# Patient Record
Sex: FEMALE | Race: WHITE | Employment: OTHER | URBAN - METROPOLITAN AREA
[De-identification: names, ages, dates, MRNs, and addresses within clinical notes are randomized per-mention and may not be internally consistent; named-entity substitution may affect disease eponyms.]

---

## 2017-01-04 ENCOUNTER — HOSPITAL ENCOUNTER (OUTPATIENT)
Dept: PHYSICAL THERAPY | Age: 82
Discharge: HOME | End: 2017-01-04
Payer: MEDICARE

## 2017-01-04 PROCEDURE — 92526 ORAL FUNCTION THERAPY: CPT

## 2017-01-06 ENCOUNTER — HOSPITAL ENCOUNTER (OUTPATIENT)
Dept: PHYSICAL THERAPY | Age: 82
Discharge: HOME | End: 2017-01-06
Payer: MEDICARE

## 2017-01-06 PROCEDURE — 92526 ORAL FUNCTION THERAPY: CPT

## 2017-01-06 NOTE — PROGRESS NOTES
ST DAILY TREATMENT NOTE    Patient Name: Lokesh Dimas  Date:2017  : 1931  [x]  Patient  Verified  Payor: [place] MEDICARE / Plan: VA MEDICARE PART A & B / Product Type: Medicare /   In time:11:50 Out time: 12:25       Total Treatment Time (min): 35  Visit #: 4 of 16    Treatment Diagnosis: Dysphagia, unspecified [R13.10]    SUBJECTIVE  Pain Level (0-10 scale): 0  Any medication changes, allergies to medications, adverse drug reactions, diagnosis change, or new procedure performed?: [x] No    [] Yes (see summary sheet for update)    Subjective functional status/changes:   [] No changes reported  She did not cough hardly at all yesterday. She did cough just once before she used her inhaler but did not cough during breakfast. She started coughing after she got in the car and now a lot since she has been in the clinic she said. OBJECTIVE  Treatment provided includes:  Increase/Improve:  []  Voice Quality []  Cognitive Linguistic Skills []  Laryngeal/Pharyngeal Exercises   []  Vocal Loudness []  Reading Comprehension [x]  Swallowing Skills    []  Vocal Cord Function []  Auditory Comprehension []  Oral Motor Skills   []  Resonance []  Writing Skills [x]  Compensatory strategies    []  Speech Intelligibility []  Expressive Language []     []  Breath Support/Coord. []  Receptive language []    []  Articulation [x]  Safety Awareness []    []  Fluency []  Word Retrieval []      Treatment Provided:  Pt state information regarding gerd diet and lifestyle guidelines: 70% acc with min cues. GERD information-teaching/answering all her questions. thin liquid trials via cup with the use of her swallowing guidelines. She demo use of her swallowing strategies: sitting upright for all oral intake; taking small isolated sips.  With chin tuck for the liquids, and extra (dry) swallow, 10/10 100% acc for liquids and solids VC's 20% of the time for chin tuck and extra dry swallow, independence with the other swallowing strategies. She coughed/throat cleared 5 times today, once after PO trial and the others before. .      Patient/Caregiver  Education: [] Review HEP      Answered her questions about foods that are recommended and those that aren't related to acidity. about humidifier, dry vocal folds could be contributing to your cough. HEP/Handouts given:    Humidifier handout. Clear filter daily. Other Objective/Functional Measures:    Pain Level (0-10 scale) post treatment: 0    ASSESSMENT  Patient required an occ cue to chin tuck and dbl swallow with thin liquid trials today. She coughed once post swallow and continues to cough without PO trials also. []  See Plan of Care  []  See progress note/recertification  []  See Discharge Summary    Patient will continue to benefit from skilled therapy to address remaining functional deficits: pharyngeal dysphagia  And moderate-severe dysphonia with chronic cough  Progress towards goals / Updated goals:  1. Patient will be able to state and follow compensatory swallowing strategies (sitting upright for all oral intake and for at least 30 minutes afterward meals, decrease bolus size, extras dry swallows alternate solids and liquids, meds in applesauce cyclical ingestion, ) for po intake for liquids and solids with 8/10 trials - 80% accuracy with min cues    progressing  3. Patient will be able to state GERD diet and lifestyle guidelines with 80% acc with questions and answer format with min cues. progressing       Long Term Goals: To be accomplished in 10-12 weeks   patient will:          1. Tolerate the least restrictive diet consistency utilizing compensatory swallow techniques((sitting upright for all oral intake and for at least 30 minutes afterward meals, decrease bolus size, extras dry swallows alternate solids and liquids, meds in applesauce cyclical ingestion) , without overt s/sx aspiration/distress in 8/10 trials with mod I        2.  Demonstrate the ability to adequately self-monitor swallowing skills and perform   appropriate compensatory techniques to reduce s/sx of aspiration and to safely consume least-restrictive diet 8/10 trials 80% accuracy.       PLAN  [x]  Continue plan of care  []  Modify Goals/Treatment Plan      []  Discharge due to:  [] Other:   CRIS Bonds 1/6/2017  11:59 AM    Future Appointments  Date Time Provider Roverto Meza   1/11/2017 3:00 PM CRIS Bonsd [facility]   1/18/2017 10:00 AM CRIS Bonds [facility]   1/25/2017 10:00 AM CRIS Bonds [facility]   2/1/2017 10:00 AM CRIS Bonds [facility]   2/8/2017 10:00 AM CRIS Bonds [facility]   2/15/2017 10:00 AM CRIS Bonds [facility]   2/22/2017 10:00 AM CRIS Bonds [facility]

## 2017-01-11 ENCOUNTER — APPOINTMENT (OUTPATIENT)
Dept: PHYSICAL THERAPY | Age: 82
End: 2017-01-11
Payer: MEDICARE

## 2017-01-11 ENCOUNTER — HOSPITAL ENCOUNTER (OUTPATIENT)
Dept: PHYSICAL THERAPY | Age: 82
Discharge: HOME | End: 2017-01-11
Payer: MEDICARE

## 2017-01-11 PROCEDURE — 92526 ORAL FUNCTION THERAPY: CPT

## 2017-01-11 NOTE — PROGRESS NOTES
ST DAILY TREATMENT NOTE    Patient Name: Edwige Acevedo  Date:2017  : 1931  [x]  Patient  Verified  Payor: VA MEDICARE / Plan: VA MEDICARE PART A & B / Product Type: Medicare /   In time:2:50 Out time: 2:25      Total Treatment Time (min): 35  Visit #: 5 of 16    Treatment Diagnosis: Dysphagia, unspecified [R13.10]    SUBJECTIVE  Pain Level (0-10 scale): 0  Any medication changes, allergies to medications, adverse drug reactions, diagnosis change, or new procedure performed?: [x] No    [] Yes (see summary sheet for update)    Subjective functional status/changes:   [] No changes reported  She did coughed earlier this morning because she did have to make a phone call so she coughed to clear her throat. She said that she ate a whole sandwich today for lunch and did not have any problems. She reports using her humidifier. OBJECTIVE  Treatment provided includes:  Increase/Improve:  []  Voice Quality []  Cognitive Linguistic Skills []  Laryngeal/Pharyngeal Exercises   []  Vocal Loudness []  Reading Comprehension [x]  Swallowing Skills    []  Vocal Cord Function []  Auditory Comprehension []  Oral Motor Skills   []  Resonance []  Writing Skills [x]  Compensatory strategies    []  Speech Intelligibility []  Expressive Language []     []  Breath Support/Coord. []  Receptive language []    []  Articulation [x]  Safety Awareness []    []  Fluency []  Word Retrieval []      Treatment Provided:  Pt stated information regarding gerd diet and lifestyle guidelines: 100% acc with cues. She stated that she is following them. Patient stated her swallowing guidelines with 100% acc. thin liquid trials via cup and regular consistency harder crackers with the use of her swallowing guidelines. She demo use of her swallowing strategies:   sitting upright for all oral intake; taking small bites and small isolated sips.  With chin tuck for the liquids, and extra (dry) swallow,and  alternate solids and liquids; 10/10 100% acc for liquids and solids [place]. She said that she is sitting up at least 30 minutes after PO, taking her meds in applesauce without difficulty. She coughed/throat cleared 9  times today, none with PO. She said that she felt dry in her throat so she coughed. She would throat clear and cough and asked why she said that her throat felt dry. Patient/Caregiver  Education: [] Review HEP      Except when eating or drinking try to use an alternative to coughing and throat clearing-hard dry swallow or small liquid sip with chin tuck and extra dry swallow. Effect of excessive coughing/throat clearing on vocal folds which can cause hoarseness. HEP/Handouts given: Alt to throat clearing, handout about throat clearing and use of alt. Other Objective/Functional Measures:    Pain Level (0-10 scale) post treatment: 0    ASSESSMENT  Patient was independent for stating GERD and swallowing guidelines. She reported that she is following them. She was independent with following the swallowing guidelines with thin liquid and reg consistency and exhibited no overt s/s of asp (change in vocal quality, throat clear, cough). She did cough and throat clear throughout the session prior to and after not related to PO. It appears that some of it could be habitual.   []  See Plan of Care  []  See progress note/recertification  []  See Discharge Summary    Patient will continue to benefit from skilled therapy to address remaining functional deficits: pharyngeal dysphagia  And moderate-severe dysphonia with chronic cough  Progress towards goals / Updated goals:  1.  Patient will be able to state and follow compensatory swallowing strategies (sitting upright for all oral intake and for at least 30 minutes afterward meals, decrease bolus size, extras dry swallows alternate solids and liquids, meds in applesauce cyclical ingestion,) for po intake for liquids and solids with 8/10 trials - 80% accuracy with min cues    Goal met.  3. Patient will be able to state GERD diet and lifestyle guidelines with 80% acc with questions and answer format with min cues. Goal met.      Long Term Goals: To be accomplished in 10-12 weeks   patient will:          1. Tolerate the least restrictive diet consistency utilizing compensatory swallow techniques((sitting upright for all oral intake and for at least 30 minutes afterward meals, decrease bolus size, extras dry swallows alternate solids and liquids, meds in applesauce cyclical ingestion) , without overt s/sx aspiration/distress in 8/10 trials with mod I     MBS requested-MD has not returned from [place] vacation-not until 1/13/17.   2. Demonstrate the ability to adequately self-monitor swallowing skills and perform   appropriate compensatory techniques to reduce s/sx of aspiration and to safely consume least-restrictive diet 8/10 trials 80% accuracy. Goal met for stated guidelines        PLAN  [x]  Continue plan of care  []  Modify Goals/Treatment Plan      []  Discharge due to:  [x] Other: SLP called Dr. Jayne Cummings office again regarding script for MBS-Dr. Kinga Lane will return from [facility] on 1/13/17-her staff will give her the message. Place patient on hold until she has an MBS (if MD approves it).   CRIS Crisostomo 1/11/2017  3:00PM    Future Appointments  Date Time Provider Roverto Meza   1/11/2017 3:00 PM CRIS Crisostomo [facility]   1/18/2017 10:00 AM CRIS Crisostomo [facility]   1/25/2017 10:00 AM CRIS Crisostomo [facility]   2/1/2017 11:30 AM CRIS Crisostomo [facility]   2/8/2017 10:45 AM CRIS Crisostomo [facility]   2/15/2017 11:30 AM CRIS Crisostomo [facility]   2/22/2017 10:45 AM CRIS Crisostomo [facility]

## 2017-01-12 ENCOUNTER — APPOINTMENT (OUTPATIENT)
Dept: PHYSICAL THERAPY | Age: 82
End: 2017-01-12
Payer: MEDICARE

## 2017-01-18 ENCOUNTER — APPOINTMENT (OUTPATIENT)
Dept: PHYSICAL THERAPY | Age: 82
End: 2017-01-18
Payer: MEDICARE

## 2017-01-18 DIAGNOSIS — F32.89 OTHER DEPRESSION: ICD-10-CM

## 2017-01-18 RX ORDER — SERTRALINE HYDROCHLORIDE 50 MG/1
TABLET, FILM COATED ORAL
Qty: 90 TAB | Refills: 3 | Status: SHIPPED | OUTPATIENT
Start: 2017-01-18 | End: 2017-01-26 | Stop reason: SDUPTHER

## 2017-01-19 RX ORDER — SERTRALINE HYDROCHLORIDE 50 MG/1
TABLET, FILM COATED ORAL
Qty: 30 TAB | Refills: 0 | Status: SHIPPED | OUTPATIENT
Start: 2017-01-19

## 2017-01-25 ENCOUNTER — APPOINTMENT (OUTPATIENT)
Dept: PHYSICAL THERAPY | Age: 82
End: 2017-01-25
Payer: MEDICARE

## 2017-01-26 ENCOUNTER — OFFICE VISIT (OUTPATIENT)
Dept: INTERNAL MEDICINE CLINIC | Age: 82
End: 2017-01-26

## 2017-01-26 VITALS
HEIGHT: 65 IN | RESPIRATION RATE: 16 BRPM | HEART RATE: 74 BPM | OXYGEN SATURATION: 98 % | DIASTOLIC BLOOD PRESSURE: 58 MMHG | TEMPERATURE: 98.7 F | BODY MASS INDEX: 25.33 KG/M2 | WEIGHT: 152 LBS | SYSTOLIC BLOOD PRESSURE: 132 MMHG

## 2017-01-26 DIAGNOSIS — H60.391 OTHER INFECTIVE OTITIS EXTERNA OF RIGHT EAR: Primary | ICD-10-CM

## 2017-01-26 DIAGNOSIS — F43.21 UNRESOLVED GRIEF: ICD-10-CM

## 2017-01-26 RX ORDER — CIPROFLOXACIN AND DEXAMETHASONE 3; 1 MG/ML; MG/ML
SUSPENSION/ DROPS AURICULAR (OTIC)
Qty: 7.5 ML | Refills: 1 | Status: SHIPPED | OUTPATIENT
Start: 2017-01-26 | End: 2017-03-22 | Stop reason: ALTCHOICE

## 2017-01-26 NOTE — PATIENT INSTRUCTIONS
Health Maintenance Due   Topic Date Due    DTaP/Tdap/Td  (1 - Tdap) 07/25/1989    Glaucoma Screening   07/23/1996    Annual Well Visit  07/23/1996

## 2017-01-26 NOTE — ACP (ADVANCE CARE PLANNING)
The patient has made an advanced directive. she was advised to bring a copy into the office for us to scan into the system.

## 2017-01-26 NOTE — MR AVS SNAPSHOT
Visit Information Date & Time Provider Department Dept. Phone Encounter #  
 1/26/2017 11:30 AM Valeria Chang MD [facility] Reji Brito [phone] [id number] Your Appointments 2/1/2017  3:45 PM  
Follow Up with Leticia Arroyo MD  
[address] ([facility]) Appt Note: from 12/2016  
 [address] [facility], [address] [address]  
[phone]  
  
   
 [address] [place] [address], [address] [facility] [place] [address]  
  
    
 5/30/2017  2:00 PM  
Follow Up with Valeria Chang MD  
[address] [facility]) Appt Note: 4mo  
 [address], [address] [place] [address]  
  
   
 [address] [facility], [address] [place] [address] Upcoming Health Maintenance Date Due DTaP/Tdap/Td series (1 - Tdap) 7/25/1989 GLAUCOMA SCREENING Q2Y 7/23/1996 MEDICARE YEARLY EXAM 7/23/1996 Allergies as of 1/26/2017  Review Complete On: 1/26/2017 By: Valeria Chang MD  
  
 Severity Noted Reaction Type Reactions Penicillin V Potassium    Diarrhea States not allergic Shellfish Derived  04/21/2016    Swelling Current Immunizations  Reviewed on 12/12/2016 Name Date Influenza High Dose Vaccine PF 12/12/2016 Influenza Vaccine (Quad) PF 9/25/2015  1:00 PM  
 Pneumococcal Polysaccharide (PPSV-23) 9/19/2011 Td 7/24/1989 Zoster Vaccine, Live 2/8/2008 Not reviewed this visit Vitals BP Pulse Temp Resp Height(growth percentile) 132/58 (BP 1 Location: Left arm, BP Patient Position: Sitting) 74 98.7 °F (37.1 °C) (Tympanic) 16 5' 5\" (1.651 m) Weight(growth percentile) SpO2 BMI OB Status Smoking Status 152 lb (68.9 kg) 98% 25.29 kg/m2 Hysterectomy Never Smoker BMI and BSA Data Body Mass Index Body Surface Area  
 25.29 kg/m 2 1.78 m 2 Preferred Pharmacy Pharmacy Name Phone [facility] [facility] [phone] Your Updated Medication List  
  
   
This list is accurate as of: 17 12:41 PM.  Always use your most recent med list.  
  
  
  
  
 ciprofloxacin-dexamethasone 0.3-0.1 % otic suspension Commonly known as:  CIPRODEX  
5 drops in right ear twice per day  
  
 clotrimazole-betamethasone topical cream  
Commonly known as:  Yadira Ruthie Apply  to affected area two (2) times a day. cyanocobalamin 1,000 mcg tablet Take 1,000 mcg by mouth daily. diazePAM 5 mg tablet Commonly known as:  VALIUM Take 5 mg by mouth every six (6) hours as needed for Anxiety. fluticasone 50 mcg/actuation nasal spray Commonly known as:  FLONASE  
1 spray each nostril daily  
  
 hydroCHLOROthiazide 12.5 mg capsule Commonly known as:  Ladonna [place] TAKE ONE CAPSULE BY MOUTH ONCE DAILY IN THE MORNING  
  
 PREMARIN 0.3 mg tablet Generic drug:  conjugated estrogens Take 0.3 mg by mouth daily. sertraline 50 mg tablet Commonly known as:  ZOLOFT  
TAKE ONE TABLET BY MOUTH ONCE DAILY  
  
 tiotropium bromide 1.25 mcg/actuation inhaler Commonly known as:  Clarice Gale Take 2 Puffs by inhalation daily. VITAMIN D3 1,000 unit Cap Generic drug:  cholecalciferol Take  by mouth daily. ZyrTEC 10 mg tablet Generic drug:  cetirizine Take  by mouth. Prescriptions Sent to Pharmacy Refills  
 tiotropium bromide (SPIRIVA RESPIMAT) 1.25 mcg/actuation inhaler 3 Sig: Take 2 Puffs by inhalation daily. Class: Normal  
 Pharmacy: [address] [place] [address] Ph #: [phone] Route: Inhalation  
 ciprofloxacin-dexamethasone (CIPRODEX) 0.3-0.1 % otic suspension 1 Si drops in right ear twice per day Class: Normal  
 Pharmacy: [address] [place] [address] Ph #: [phone] To-Do List   
 02/01/2017 11:30 AM  
  Appointment with CRIS Morrell at [facility] PT [address] [facility] ([phone]) 02/08/2017 10:45 AM  
  Appointment with CRIS Morrell at [facility] ([phone])  
  
 02/15/2017 11:30 AM  
  Appointment with CRIS Morrell at [facility] PT [facility] ([phone])  
  
 02/22/2017 10:45 AM  
  Appointment with CRIS Morrell at [address] [place] [address] ([phone]) Patient Instructions Health Maintenance Due Topic Date Due  
 DTaP/Tdap/Td  (1 - Tdap) 07/25/1989  [facility] Screening   [facility] Well Visit  07/23/1996 Please provide this summary of care documentation to your next provider. Your primary care clinician is listed as Zakiya Burgos. If you have any questions after today's visit, please call [phone].

## 2017-01-26 NOTE — PROGRESS NOTES
1. Have you been to the ER, urgent care clinic since your last visit? Hospitalized since your last visit? No    2. Have you seen or consulted any other health care providers outside of the [address] [place] [address] since your last visit? Include any pap smears or colon screening.  No

## 2017-01-28 NOTE — PROGRESS NOTES
The patient presents to the office today with the chief complaint of right ear pain    HPI    The patient complains of right pain. Present for several days. The pain is getting worse. There has been minimal sinusThe patient is still having difficulty getting over her 's death. She is seeing a psychologist which has been helpful. Review of Systems   HENT: Positive for ear pain. Respiratory: Negative for shortness of breath. Cardiovascular: Negative for chest pain and leg swelling. Allergies   Allergen Reactions    Penicillin V Potassium Diarrhea     States not allergic    Shellfish Derived Swelling       Current Outpatient Prescriptions   Medication Sig Dispense Refill    ciprofloxacin-dexamethasone (CIPRODEX) 0.3-0.1 % otic suspension 5 drops in right ear twice per day 7.5 mL 1    sertraline (ZOLOFT) 50 mg tablet TAKE ONE TABLET BY MOUTH ONCE DAILY 30 Tab 0    hydroCHLOROthiazide (MICROZIDE) 12.5 mg capsule TAKE ONE CAPSULE BY MOUTH ONCE DAILY IN THE MORNING 90 Cap 0    conjugated estrogens (PREMARIN) 0.3 mg tablet Take 0.3 mg by mouth daily.  cholecalciferol (VITAMIN D3) 1,000 unit cap Take  by mouth daily.  cyanocobalamin 1,000 mcg tablet Take 1,000 mcg by mouth daily.  clotrimazole-betamethasone (LOTRISONE) topical cream Apply  to affected area two (2) times a day.  fluticasone (FLONASE) 50 mcg/actuation nasal spray 1 spray each nostril daily 1 Bottle 1    diazepam (VALIUM) 5 mg tablet Take 5 mg by mouth every six (6) hours as needed for Anxiety.  cetirizine (ZYRTEC) 10 mg tablet Take  by mouth.  tiotropium bromide (SPIRIVA RESPIMAT) 1.25 mcg/actuation inhaler Take 2 Puffs by inhalation daily.  3 Inhaler 3       Past Medical History   Diagnosis Date    Acute bronchitis     Acute upper respiratory infections of unspecified site     Cough     Essential hypertension, benign     Meniere's disease, unspecified     Muscle pain     Pain in limb Past Surgical History   Procedure Laterality Date    Hx hysterectomy      Hx mohs procedure Left        Social History     Social History    Marital status:      Spouse name: N/A    Number of children: N/A    Years of education: N/A     Occupational History    Not on file. Social History Main Topics    Smoking status: Never Smoker    Smokeless tobacco: Never Used    Alcohol use No    Drug use: No    Sexual activity: Not Currently     Other Topics Concern    Not on file     Social History Narrative       Patient does not have an advanced directive on file    Visit Vitals    /58 (BP 1 Location: Left arm, BP Patient Position: Sitting)    Pulse 74    Temp 98.7 °F (37.1 °C) (Tympanic)    Resp 16    Ht 5' 5\" (1.651 m)    Wt 152 lb (68.9 kg)    SpO2 98%    BMI 25.29 kg/m2       Physical Exam   Neck: Carotid bruit is not present. No thyromegaly present. Cardiovascular: Normal rate and regular rhythm. Exam reveals no gallop. No murmur heard. Pulmonary/Chest: She has no wheezes. She has no rales. Abdominal: Soft. Bowel sounds are normal. She exhibits no distension and no mass. There is no splenomegaly or hepatomegaly. There is no tenderness. Musculoskeletal: She exhibits no edema. Lymphadenopathy:     She has no cervical adenopathy. Right: No supraclavicular adenopathy present. Left: No supraclavicular adenopathy present. Hospital Outpatient Visit on 12/07/2016   Component Date Value Ref Range Status    Creatinine (POC) 12/07/2016 0.7  0.6 - 1.3 MG/DL Final    GFR-AA (POC) 12/07/2016 >60  >60 ml/min/1.73m2 Final    GFR, non-AA (POC) 12/07/2016 >60  >60 ml/min/1.73m2 Final    Comment: Estimated GFR is calculated using the IDMS-traceable Modification of Diet in Renal Disease (MDRD) Study equation, reported for both  Americans (GFRAA) and non- Americans (GFRNA), and normalized to 1.73m2 body surface area.  The physician must decide which value applies to the patient. The MDRD study equation should only be used in individuals age 25 or older. It has not been validated for the following: pregnant women, patients with serious comorbid conditions, or on certain medications, or persons with extremes of body size, muscle mass, or nutritional status. .No results found for any visits on 01/26/17. Assessment / Plan      ICD-10-CM ICD-9-CM    1. Other infective otitis externa of right ear H60.391     2. Unresolved grief F43.21 309.1      Cirpodex otic sol  she was advised to continue her maintenance medications  Patient is to continue with counseling    Follow-up Disposition:  Return in about 4 months (around 5/26/2017). I asked Fred Palmer if she has any questions and I answered the questions. Asif Palmer states that she understands the treatment plan and agrees with the treatment plan

## 2017-02-01 ENCOUNTER — OFFICE VISIT (OUTPATIENT)
Dept: PULMONOLOGY | Age: 82
End: 2017-02-01

## 2017-02-01 ENCOUNTER — APPOINTMENT (OUTPATIENT)
Dept: PHYSICAL THERAPY | Age: 82
End: 2017-02-01

## 2017-02-01 VITALS
OXYGEN SATURATION: 98 % | TEMPERATURE: 98 F | HEIGHT: 65 IN | HEART RATE: 87 BPM | WEIGHT: 145 LBS | BODY MASS INDEX: 24.16 KG/M2 | RESPIRATION RATE: 18 BRPM | SYSTOLIC BLOOD PRESSURE: 140 MMHG | DIASTOLIC BLOOD PRESSURE: 64 MMHG

## 2017-02-01 DIAGNOSIS — J84.9 ILD (INTERSTITIAL LUNG DISEASE) (HCC): Primary | ICD-10-CM

## 2017-02-01 DIAGNOSIS — J47.9 BRONCHIECTASIS WITHOUT COMPLICATION (HCC): ICD-10-CM

## 2017-02-01 DIAGNOSIS — R13.12 OROPHARYNGEAL DYSPHAGIA: ICD-10-CM

## 2017-02-01 NOTE — PROGRESS NOTES
[facility] [place] [facility]  [facility]  [phone] fax    Medicare Progress Report  Patient name: Tariq Montemayor Start of Care: 2016   Referral source: Sho Whitney MD : 1931    Medical Diagnosis: Dysphagia, unspecified [R13.10] Onset Date:     Treatment Diagnosis: Pharyngeal dysphagia   Prior Hospitalization: see medical history Provider#: [id number]   Medications: Verified on Patient summary List   Comorbidities: HTN, Acute bronchitis, Cough, Muscle pain, Acute upper respiratory infections of unspecified site, Meniere's disease, unspecified, hysterectomy, rotator cuff repair     Prior Level of Function: consuming a regular diet with thin liquids without difficulty    Visits from Start of Care: 4    Missed Visits: 0    Reporting Period: 16 to 17    Subjective Reports: on 17 she said that she coughed earlier this morning because she did have to make a phone call so she coughed to clear her throat. She said that she ate a whole sandwich today for lunch and did not have any problems. She reports using her humidifier as this SLP suggested. On 17 she said: She did not cough hardly at all yesterday. She did cough just once before she used her inhaler but did not cough during breakfast. She started coughing after she got in the car and now a lot since she has been in the clinic she said.       [place]= independently  Current Status/ treatment goals Objective measures   1.  Patient will be able to state and follow compensatory swallowing strategies (sitting upright for all oral intake and for at least 30 minutes afterward meals, decrease bolus size, extras dry swallows alternate solids and liquids, meds in applesauce cyclical ingestion,) for po intake for liquids and solids with 8/10 trials - 80% accuracy with min cues   [x] met                 [] not met  [] progressing  Patient stated her swallowing guidelines with 100% acc. At her last visit on 1/11/17:   thin liquid trials via cup and regular consistency harder crackers with the use of her swallowing guidelines. She demo use of her swallowing strategies: sitting upright for all oral intake; taking small bites and small isolated sips. With chin tuck for the liquids, and extra (dry) swallow,and  alternate solids and liquids; 10/10 100% acc for liquids and solids [place]. She said that she is sitting up at least 30 minutes after PO, taking her meds in applesauce without difficulty. She coughed/throat cleared 9 times today, none with PO. She said that she felt dry in her throat so she coughed. She would throat clear and cough and asked why she said that her throat felt dry. 3. Patient will be able to state GERD diet and lifestyle guidelines with 80% acc with questions and answer format with min cues. Goal met. [x] met                 [] not met  [] progressing Pt stated information regarding gerd diet and lifestyle guidelines: 100% acc with cues. She stated that she is following them.       Progress with Long Term Goals  1. Tolerate the least restrictive diet consistency utilizing compensatory swallow techniques((sitting upright for all oral intake and for at least 30 minutes afterward meals, decrease bolus size, extras dry swallows alternate solids and liquids, meds in applesauce cyclical ingestion) , without overt s/sx aspiration/distress in 8/10 trials with mod I        [] met                 [x] not met  [] progressing  See STG # 1. MBS requested-MD has not returned from [place] vacation-not until 1/13/17.    2. Demonstrate the ability to adequately self-monitor swallowing skills and perform   appropriate compensatory techniques to reduce s/sx of aspiration and to safely consume least-restrictive diet 8/10 trials 80% accuracy.      [x] met                 [] not met  [] progressing  Goal met for stated guidelines.  100% acc, [place]      Key functional changes: Patient was independent for stating GERD and swallowing guidelines. She reported that she is following them. She was independent with following the swallowing guidelines with thin liquid and reg consistency and exhibited no overt s/s of asp (change in vocal quality, throat clear, cough). She did cough and throat clear throughout the session prior to and after not related to PO. It appears that some of it could be habitual.      Problems/ barriers to goal attainment: none     Assessment / Recommendations:She has an BA esophagram on 12/7/16 with a single episode of silent tracheal aspiration of a trace amount of barium contrast. There was gastroesophageal reflux in the thoracic outlet. Patient is following GERD diet and lifestyle recommendations and the swallowing guidelines developed during the bedside swallowing evaluation. She has not been doing any swallowing exercises yet-still recommending MBS-recommended on 12/23/16. Referring physician has been out of the office-returning January 13th. Several requests made to staff to direct the request to the referring MD when she returns. Problem List:    []aphasic  []dysarthric  [x]dysphagic       []alexic  []agraphic  []dysphonia       []dysfluency   []Cognitive-Linguistic Disorder       []other      Treatment Plan: Dysphagia Treatment and Patient Education; recommend MBS    Patient Goal (s) has been updated and includes: \"I want to swallow safely and stop coughing, have clearer voice\"     Updated Goals to be accomplished in 4 weeks:  Patient will participate in an MBS recommended by SLP, and with referring physician's approval.  Updated: Long Term Goals: To be accomplished in 10-12 weeks   patient will:        1.  Tolerate the least restrictive diet consistency utilizing compensatory swallow techniques(sitting upright for all oral intake and for at least 30 minutes afterward meals, decrease bolus size, extras dry swallows alternate solids and liquids, meds in applesauce cyclical ingestion and any others strategies identified via MBS), without overt s/sx aspiration/distress in 8/10 trials with mod I     Frequency / Duration: Patient to be seen 1-2 times per week for 4 weeks: beginning after patient has an MBS if her referring MD agrees to the procedure. G Codes:    [id number] Swallow Current Status CI= 1-19%   Swallow Goal Status CH= 0%    The severity rating is based on the following outcomes:    National Outcomes Measures (NOMS), results of MBS (if she participates in the procedure)      Request script for MBS to determine the reason for patient's silent aspiration on the BA swallow, determine safe swallowing guidelines based on the MBS.        Eleonora Desai, SLP 2/1/2017 12:42 PM

## 2017-02-01 NOTE — MR AVS SNAPSHOT
Visit Information Date & Time Provider Department Dept. Phone Encounter #  
 2/1/2017  3:45 PM Neeraj Cosme MD [facility] [id number] Follow-up Instructions Return in about 3 months (around 5/1/2017). Your Appointments 5/30/2017  2:00 PM  
Follow Up with Joe Lucero MD  
[facility]) Appt Note: 4mo  
 [address] [place], [address]  
  
   
 [address] [place], [address] [place] [address] Upcoming Health Maintenance Date Due DTaP/Tdap/Td series (1 - Tdap) 7/25/1989 MEDICARE YEARLY EXAM 7/23/1996 GLAUCOMA SCREENING Q2Y 2/1/2018 Allergies as of 2/1/2017  Review Complete On: 2/1/2017 By: Neeraj Cosme MD  
  
 Severity Noted Reaction Type Reactions Penicillin V Potassium    Diarrhea States not allergic Shellfish Derived  04/21/2016    Swelling Current Immunizations  Reviewed on 12/12/2016 Name Date Influenza High Dose Vaccine PF 12/12/2016 Influenza Vaccine (Quad) PF 9/25/2015  1:00 PM  
 Pneumococcal Polysaccharide (PPSV-23) 9/19/2011 Td 7/24/1989 Zoster Vaccine, Live 2/8/2008 Not reviewed this visit You Were Diagnosed With   
  
 Codes Comments Oropharyngeal dysphagia    -  Primary ICD-10-CM: R13.12 
ICD-9-CM: 787.22 Vitals BP Pulse Temp Resp Height(growth percentile) Weight(growth percentile) 140/64 (BP 1 Location: Left arm, BP Patient Position: Sitting) 87 98 °F (36.7 °C) (Oral) 18 5' 5\" (1.651 m) 145 lb (65.8 kg) SpO2 BMI OB Status Smoking Status 98% 24.13 kg/m2 Hysterectomy Never Smoker BMI and BSA Data Body Mass Index Body Surface Area  
 24.13 kg/m 2 1.74 m 2 Preferred Pharmacy Pharmacy Name Phone  [facility] [place] [facility] [phone] Your Updated Medication List  
  
   
 This list is accurate as of: 2/1/17  4:15 PM.  Always use your most recent med list.  
  
  
  
  
 ciprofloxacin-dexamethasone 0.3-0.1 % otic suspension Commonly known as:  CIPRODEX  
5 drops in right ear twice per day  
  
 clotrimazole-betamethasone topical cream  
Commonly known as:  Samreen Tricia Apply  to affected area two (2) times a day. cyanocobalamin 1,000 mcg tablet Take 1,000 mcg by mouth daily. diazePAM 5 mg tablet Commonly known as:  VALIUM Take 5 mg by mouth every six (6) hours as needed for Anxiety. fluticasone 50 mcg/actuation nasal spray Commonly known as:  FLONASE  
1 spray each nostril daily  
  
 hydroCHLOROthiazide 12.5 mg capsule Commonly known as:  Paul Raveling TAKE ONE CAPSULE BY MOUTH ONCE DAILY IN THE MORNING  
  
 PREMARIN 0.3 mg tablet Generic drug:  conjugated estrogens Take 0.3 mg by mouth daily. sertraline 50 mg tablet Commonly known as:  ZOLOFT  
TAKE ONE TABLET BY MOUTH ONCE DAILY  
  
 tiotropium bromide 1.25 mcg/actuation inhaler Commonly known as:  Lisa Gayer Take 2 Puffs by inhalation daily. VITAMIN D3 1,000 unit Cap Generic drug:  cholecalciferol Take  by mouth daily. ZyrTEC 10 mg tablet Generic drug:  cetirizine Take  by mouth. Follow-up Instructions Return in about 3 months (around 5/1/2017). To-Do List   
 02/01/2017 Imaging:  XR SWALLOW [facility] VIDEO Patient Instructions Learning About Swallowing Problems What are swallowing problems? Certain health problems that affect the nervous system can cause trouble swallowing. These conditions include stroke, ALS (also known as Chitra Gehrig's disease), Parkinson's disease, and multiple sclerosis. The muscles and nerves that help move food through the throat and esophagus may not work right. Growths, such as cancer, and other problems with your esophagus can also make it hard to swallow.  The esophagus is the tube that leads from your throat to your stomach. How are swallowing problems diagnosed? A doctor or speech therapist will examine you to check for swallowing problems. You may get swallowing tests to check how well your throat muscles work. For these tests, you swallow a special liquid that helps the doctor see your throat and esophagus on an X-ray or video screen. Other tests use a thin, flexible tube called a scope to check for problems with your esophagus. The doctor puts the scope in your mouth and down your throat to look at your esophagus. What are the symptoms? Symptoms of swallowing problems may include: · Trouble getting food or liquids to go down on the first try. · Gagging, choking, or coughing when you swallow. · Having food or liquids come back up through your throat, mouth, or nose after you swallow. · Feeling like foods or liquids are stuck in some part of your throat or chest. 
· Pain when you swallow. How are swallowing problems treated? How swallowing problems are treated depends on the cause. The main goals of treatment will be to help you eat and swallow safely and get good nutrition. This is important for your health and quality of life. You may learn exercises to train your throat muscles to work together so you're able to swallow better. Learning certain ways to put food in your mouth or to position your head while eating may also help. Your doctor or a speech therapist may recommend changes to your diet to help make it easier to swallow. You may need to avoid certain foods or liquids. You also may need to change the thickness of foods or liquids in your diet. To eat and swallow safely, follow any instructions you get from your doctor or therapist. These ideas may help: 
· Sit upright when eating, drinking, and taking pills. · Take small bites of food. Chew completely and swallow before taking another bite. · Take small sips of liquids.  Hold the liquid in your mouth as you prepare to swallow. · If eating makes you tired, eat smaller but more frequent meals. · If you cough or choke, lean forward and keep your chin tipped downward while you cough. Where can you learn more? Go to http://david-john.info/. Enter [id number] [id number] in the search box to learn more about \"Learning About Swallowing Problems. \" Current as of: May 27, 2016 Content Version: 11.1 © 0276-2900 [employer]. Care instructions adapted under license by [employer] (which disclaims liability or warranty for this information). If you have questions about a medical condition or this instruction, always ask your healthcare professional. [address] any warranty or liability for your use of this information. Please provide this summary of care documentation to your next provider. Your primary care clinician is listed as Triny Rainey. If you have any questions after today's visit, please call [phone].

## 2017-02-01 NOTE — PROGRESS NOTES
[facility]  Pulmonary, Critical Care, and Sleep Medicine      Pulmonary Office visit    Name: Maco Grant     : 1931     Date: 2017        Subjective:     Patient is a 80 y.o. female is referred for evaluation of cough. 17   Feels improved with treatment and speech therapy recommendations. Has had some episodes of cough- following with speech therapy for safe swallowing therapy. Modified barium swallow recommended. Patient states Deniseamira Ruiz do I need it? \"  She had some ear pain- saw ENt and felt to be related to cochlear implant. Improved after treatment with antibiotics. Feels \"spiriva respimet helping \". Denies fever, chills. HPI:  She describes chronic cough all year around with worsening in winter. Cough starts with bronchitis and persists cough productive of milky- yellow mucus and sometimes difficulty expectoration. C/o stuffy nose and post nasal drip.denies headache. Uses nasal sprays- Flonase. Takes allegra for ears congestion. Not  On any inhalers. Denies wheezing. Denies any exposure to allergens per se but daughter has indoor dog. Denies Pneumonia. Occasional acid reflux. C/o arthritic joint pain  Denies pedal edema, PND, orthopne  Non smoker and no industrial exposure. Spouse worked at [employer] for 30 + years- Planner and  for most of his career but had asbestos exposure in early years.     Past Medical History   Diagnosis Date    Acute bronchitis     Acute upper respiratory infections of unspecified site     Cough     Essential hypertension, benign     Meniere's disease, unspecified     Muscle pain     Pain in limb        Past Surgical History   Procedure Laterality Date    Hx hysterectomy      Hx mohs procedure Left      Allergies   Allergen Reactions    Penicillin V Potassium Diarrhea     States not allergic    Shellfish Derived Swelling     Current Outpatient Prescriptions   Medication Sig Dispense Refill    tiotropium bromide (SPIRIVA RESPIMAT) 1.25 mcg/actuation inhaler Take 2 Puffs by inhalation daily. 3 Inhaler 3    ciprofloxacin-dexamethasone (CIPRODEX) 0.3-0.1 % otic suspension 5 drops in right ear twice per day 7.5 mL 1    sertraline (ZOLOFT) 50 mg tablet TAKE ONE TABLET BY MOUTH ONCE DAILY 30 Tab 0    hydroCHLOROthiazide (MICROZIDE) 12.5 mg capsule TAKE ONE CAPSULE BY MOUTH ONCE DAILY IN THE MORNING 90 Cap 0    conjugated estrogens (PREMARIN) 0.3 mg tablet Take 0.3 mg by mouth daily.  cholecalciferol (VITAMIN D3) 1,000 unit cap Take  by mouth daily.  cyanocobalamin 1,000 mcg tablet Take 1,000 mcg by mouth daily.  clotrimazole-betamethasone (LOTRISONE) topical cream Apply  to affected area two (2) times a day.  fluticasone (FLONASE) 50 mcg/actuation nasal spray 1 spray each nostril daily 1 Bottle 1    diazepam (VALIUM) 5 mg tablet Take 5 mg by mouth every six (6) hours as needed for Anxiety.  cetirizine (ZYRTEC) 10 mg tablet Take  by mouth.        Review of Systems:  HEENT: No epistaxis, no nasal drainage, no difficulty in swallowing, no redness in eyes  Respiratory: as above  Cardiovascular: no chest pain, no palpitations, no chronic leg edema, no syncope  Gastrointestinal: no abd pain, no vomiting, no diarrhea, no bleeding symptoms  Genitourinary: No urinary symptoms or hematuria  Integument/breast: No ulcers or rashes  Musculoskeletal:Neg  Neurological: No focal weakness, no seizures, no headaches  Behvioral/Psych: No anxiety, no depression  Constitutional: No fever, no chills, no weight loss, no night sweats     Objective:     Visit Vitals    /64 (BP 1 Location: Left arm, BP Patient Position: Sitting)    Pulse 87    Temp 98 °F (36.7 °C) (Oral)    Resp 18    Ht 5' 5\" (1.651 m)    Wt 65.8 kg (145 lb)    SpO2 98%  Comment: RA Rest    BMI 24.13 kg/m2        Physical Exam:   General: comfortable, no acute distress  HEENT: pupils reactive, sclera anicteric, EOM intact  Neck: No adenopathy or thyroid swelling, no lymphadenopathy or JVD, supple  CVS: S1S2 no murmurs  RS: Mod AE bilaterally, no tactile fremitus or egophony, no accessory muscle use, bibasilar cracles, no wheezing  Abd: soft, non tender, no hepatosplenomegaly  Neuro: non focal, awake, alert  Extrm: no leg edema, clubbing or cyanosis  Skin: no rash    Data review:   PFT 11/10/16:  Flows:  Normal flows  Volumes:  Functional Residual Capacity and Residual Volume are reduced  Flow Volume Loop:  Normal Flow Volume Loop  Diffusion:  Abnormal Diffusion Capacity reduced to 62 % predicted  Impression:  Reduced diffusion capacity indicating a decrease in alveolar surface area for gas exchange, Isolated reduction of Residual Volume and Functional Residual Capacity    Imaging:  I have personally reviewed the patients radiographs and have reviewed the reports:    CT scan of chest- HRCT: 12/7/2016  IMPRESSIONS:  Moderate to marked idiopathic interstitial fibrosis, in the lower zones of both  lungs, described. Mild to moderate idiopathic interstitial fibrosis in the middle and the upper  zones of both lungs, described.   Mild COPD with mild pulmonary emphysema without any obvious bulla or bleb  formation. In the lower lobes of both lung there are findings indicating mild  bronchiectasis with mild peribronchial thickenings, without retained fluid and  without definable mucous plugs. Focal fibrotic density versus nodule in right middle lobe and apical region of  left upper lobe. Any small pulmonary nodule in lungs may be obscured by  significant interstitial fibrosis. No evidence of pathologic lymphadenopathy or mass in mediastinum or at pulmonary  hilum in either side. Evaluation of focal densities in lungs with follow CT scan, in one year, is  recommended. Barium swallow: 12/7/2016;  Esophageal dysmotility. Small sliding hiatal hernia. Gastroesophageal reflux.   Single episode of silent tracheal aspiration of a trace amount of barium  contrast. Recommend consideration of modified barium swallow with speech therapy  for further evaluation. 10/20/2016  Mild interstitial fibrosis in lower lungs bilaterally. Probable mild bronchitis. No evidence of confluent pneumonia, pleural effusion or pneumothorax. Findings suggestive of mild COPD. No significant interval change. IMPRESSION:   · Cough- ILD, Pulm fibrosis related to GERD, Aspiration, ? Asbestosis , Bronchiectasis and chronic sinusitis ( allergic)  · Abnormal CXR- fibrosis noted   · Chronic rhino sinusitis - perineal and likely trigger for chronic cough   · Meinere's disease      RECOMMENDATIONS:   · HRCT findings c/w Fibrosis and bronchiectasis- discussed with patient: will focus treatment on inciting causes- GERD/aspiration and treat chronic bronchitis- treated with combination of macrolide and systemic steroid taper.- improved symptoms  · Less inclined to consider Esbriet ( typical benefit is for SOB symptoms)  · Continue inhaled Spiriva- respimet device use reviwed  · GERD precautions and treatment to continue  · Modified barium swallow ordered per speech therapist recommendations  · Speech therapy follow up  · Dietary instructions  · Will follow up in 3 months  · Ct scan follow up - 1 year       Health maintenance screens deferred to Primary care provider.      Jeannine Weems MD

## 2017-02-01 NOTE — PROGRESS NOTES
Ms. Claire Camacho has a reminder for a \"due or due soon\" health maintenance. I have asked that she contact her primary care provider for follow-up on this health maintenance.

## 2017-02-01 NOTE — PATIENT INSTRUCTIONS
Learning About Swallowing Problems  What are swallowing problems? Certain health problems that affect the nervous system can cause trouble swallowing. These conditions include stroke, ALS (also known as Chitra Gehrig's disease), Parkinson's disease, and multiple sclerosis. The muscles and nerves that help move food through the throat and esophagus may not work right. Growths, such as cancer, and other problems with your esophagus can also make it hard to swallow. The esophagus is the tube that leads from your throat to your stomach. How are swallowing problems diagnosed? A doctor or speech therapist will examine you to check for swallowing problems. You may get swallowing tests to check how well your throat muscles work. For these tests, you swallow a special liquid that helps the doctor see your throat and esophagus on an X-ray or video screen. Other tests use a thin, flexible tube called a scope to check for problems with your esophagus. The doctor puts the scope in your mouth and down your throat to look at your esophagus. What are the symptoms? Symptoms of swallowing problems may include:  · Trouble getting food or liquids to go down on the first try. · Gagging, choking, or coughing when you swallow. · Having food or liquids come back up through your throat, mouth, or nose after you swallow. · Feeling like foods or liquids are stuck in some part of your throat or chest.  · Pain when you swallow. How are swallowing problems treated? How swallowing problems are treated depends on the cause. The main goals of treatment will be to help you eat and swallow safely and get good nutrition. This is important for your health and quality of life. You may learn exercises to train your throat muscles to work together so you're able to swallow better. Learning certain ways to put food in your mouth or to position your head while eating may also help.   Your doctor or a speech therapist may recommend changes to your diet to help make it easier to swallow. You may need to avoid certain foods or liquids. You also may need to change the thickness of foods or liquids in your diet. To eat and swallow safely, follow any instructions you get from your doctor or therapist. These ideas may help:  · Sit upright when eating, drinking, and taking pills. · Take small bites of food. Chew completely and swallow before taking another bite. · Take small sips of liquids. Hold the liquid in your mouth as you prepare to swallow. · If eating makes you tired, eat smaller but more frequent meals. · If you cough or choke, lean forward and keep your chin tipped downward while you cough. Where can you learn more? Go to http://david-john.info/. Enter [id number] in the search box to learn more about \"Learning About Swallowing Problems. \"  Current as of: May 27, 2016  Content Version: 11.1  © 6129-5096 [employer]. Care instructions adapted under license by [employer] (which disclaims liability or warranty for this information). If you have questions about a medical condition or this instruction, always ask your healthcare professional. [place] [address] any warranty or liability for your use of this information.

## 2017-02-02 PROBLEM — J84.9 ILD (INTERSTITIAL LUNG DISEASE) (HCC): Status: ACTIVE | Noted: 2017-02-02

## 2017-02-02 PROBLEM — J47.9 BRONCHIECTASIS WITHOUT COMPLICATION (HCC): Status: ACTIVE | Noted: 2017-02-02

## 2017-02-08 ENCOUNTER — APPOINTMENT (OUTPATIENT)
Dept: PHYSICAL THERAPY | Age: 82
End: 2017-02-08

## 2017-02-15 ENCOUNTER — APPOINTMENT (OUTPATIENT)
Dept: PHYSICAL THERAPY | Age: 82
End: 2017-02-15

## 2017-02-16 ENCOUNTER — HOSPITAL ENCOUNTER (OUTPATIENT)
Dept: GENERAL RADIOLOGY | Age: 82
Discharge: HOME | End: 2017-02-16
Attending: INTERNAL MEDICINE
Payer: MEDICARE

## 2017-02-16 DIAGNOSIS — R13.12 OROPHARYNGEAL DYSPHAGIA: ICD-10-CM

## 2017-02-16 PROCEDURE — G8996 SWALLOW CURRENT STATUS: HCPCS

## 2017-02-16 PROCEDURE — G8998 SWALLOW D/C STATUS: HCPCS

## 2017-02-16 PROCEDURE — 74230 X-RAY XM SWLNG FUNCJ C+: CPT

## 2017-02-16 PROCEDURE — 92611 MOTION FLUOROSCOPY/SWALLOW: CPT

## 2017-02-16 PROCEDURE — 74011000255 HC RX REV CODE- 255: Performed by: INTERNAL MEDICINE

## 2017-02-16 PROCEDURE — G8997 SWALLOW GOAL STATUS: HCPCS

## 2017-02-16 RX ORDER — CLOTRIMAZOLE AND BETAMETHASONE DIPROPIONATE 10; .64 MG/G; MG/G
CREAM TOPICAL
Qty: 45 G | Refills: 2 | Status: SHIPPED | OUTPATIENT
Start: 2017-02-16 | End: 2018-04-13 | Stop reason: SDUPTHER

## 2017-02-16 RX ADMIN — BARIUM SULFATE 30 ML: 400 PASTE ORAL at 15:00

## 2017-02-16 RX ADMIN — BARIUM SULFATE 15 ML: 400 SUSPENSION ORAL at 15:00

## 2017-02-16 RX ADMIN — BARIUM SULFATE 700 MG: 700 TABLET ORAL at 15:00

## 2017-02-16 RX ADMIN — BARIUM SULFATE 60 G: 960 POWDER, FOR SUSPENSION ORAL at 15:00

## 2017-02-16 NOTE — PROGRESS NOTES
Outpatient Modified Barium Swallow Evaluation    Patient: Samm Camarena (10 y.o. female)  Date: 2/16/2017  Primary Diagnosis: Oropharyngeal dysphagia [R13.12]        Precautions: aspiration       Videofluoroscopy Results  MBS completed with results yielding min pharyngeal dysphagia c/b scant trace penetration with thin liquids +/- straw. Chin tuck and small sips ineffective at airway closure; however, scant trace penetration with thin liquids does not appear to pt at high risk for aspiration at this time. Pt tolerated reg solid, puree, nectar-thick liquid, thin liquid, and 13 mm Ba pill with thin with puree without aspiration events. Positive oropharyngeal clearance appreciated across all trials. Bolus manipulation, tongue base retraction, laryngeal elevation, and overall pharyngeal motility/sensation appeared [place] throughout study. In a-p view, bolus clearance was symmetrical. Vocal fold adduction complete upon phonation. Rec reg diet with thin liquids, aspiration precautions, oral care TID, and meds as tolerated. Pt may benefit from f/u with outpatient speech pathology to ensure safety of PO and education of safe swallowing techniques/strategies. Results and recommendations reviewed with pt in detail utilizing visual feedback. Video Flouroscopic Procedures  [x] Lateral View   [x] A-P View [] Scanned to level of Sternum    [x] Seated at 90 deg.   [] Other:    Presentation:    [x] Spoon   [x] Cup   [x] Straw   [] Syringe   [] Consecutive Swallows  [] Other:    Consistencies:   [x] Ba+ liquid   [x] Ba+ liquid (nectar)   [] Ba+ liquid (honey)   [x] Ba+ puree [x] Ba+ cookie [x] 13 mm Ba pill with puree Ba wash    Treatment Techniques Attempted  [] Head Turn: [] Right [] Left     [] Head Tilt: [] Right [] Left     [x] Chin Down: ineffective  [x] Small Sips/bites: ineffective  [] Effortful swallow:  [] Double swallow:  [] Other:    Results  Dysphagia Present:     [x] Yes  [] No    Ratings of Dysphagia:     [x] Mild  [] Moderate  [] Severe    Stages of Breakdown:   [] Oral  [x] Pharyngeal  [] Esophageal    Aspiration:   [] Yes    [x] No  [] At Risk     Cough: [] Yes      [] No     Penetration:   [x] Yes    [] No     Cough: [] Yes      [x] No   [x] Flash/trace   [] Mod   [] To Chords          Consistency Aspirated:   Consistency Penetrated:   [] Thin Liquid     [x] Thin Liquid  [] Nectar-thick Liquid    [] Nectar-thick Liquid   [] Honey-thick Liquid    [] Honey-thick Liquid   [] Puree     [] Puree  [] Solid     [] Solid    Motility Problems with:  [] Lip Closure:    [] Mastication:   [] Bolus Formation/control:   [] A-P Transport:  [] Tongue Base Retraction:  [] Swallow Response (delayed):  [] Velopharyngeal Closure:  [] Pharyngeal Aspirations:  [] Laryngeal Elevation/adduction:  [] Epiglottic Inversion:  [] Pharyngeal motility/sensation:  [] Cricopharyngeal Relaxation:  [] Esophageal Peristalsis:  [] Other:    Timing of Aspiration/Penetration:  [] Before Swallow:  [x] During Swallow:  [] After Swallow:    Transit Time Delay:  [] >1 Second  Oral  [] >1 Second Pharyngeal  [] >20 Second Esophageal     Residuals:  [] Vallecula:    [] Mild  [] Mod  [] Severe  [] Pyriform Sinus:   [] Mild  [] Mod  [] Severe  [] Posterior Pharyngeal Wall:  [] Mild  [] Mod  [] Severe    GCODES(GN): GCODESwallowing:  Swallow Current Status CI= 1-19%   Swallow Goal Status CI= 1-19%   Swallow D/C Status CI= 1-19%    Thank you for this referral.    Senait Starks M.S. CCC-SLP/L  Speech-Language Pathologist

## 2017-02-22 ENCOUNTER — APPOINTMENT (OUTPATIENT)
Dept: PHYSICAL THERAPY | Age: 82
End: 2017-02-22

## 2017-02-28 RX ORDER — HYDROCHLOROTHIAZIDE 12.5 MG/1
CAPSULE ORAL
Qty: 90 CAP | Refills: 0 | Status: SHIPPED | OUTPATIENT
Start: 2017-02-28 | End: 2017-05-31 | Stop reason: SDUPTHER

## 2017-03-10 ENCOUNTER — OFFICE VISIT (OUTPATIENT)
Dept: INTERNAL MEDICINE CLINIC | Age: 82
End: 2017-03-10

## 2017-03-10 ENCOUNTER — HOSPITAL ENCOUNTER (OUTPATIENT)
Dept: LAB | Age: 82
Discharge: HOME | End: 2017-03-10
Payer: MEDICARE

## 2017-03-10 VITALS
OXYGEN SATURATION: 98 % | SYSTOLIC BLOOD PRESSURE: 144 MMHG | WEIGHT: 144 LBS | TEMPERATURE: 97.8 F | DIASTOLIC BLOOD PRESSURE: 60 MMHG | RESPIRATION RATE: 16 BRPM | HEIGHT: 65 IN | BODY MASS INDEX: 23.99 KG/M2 | HEART RATE: 76 BPM

## 2017-03-10 DIAGNOSIS — Z13.39 SCREENING FOR ALCOHOLISM: ICD-10-CM

## 2017-03-10 DIAGNOSIS — R63.4 WEIGHT DECREASING: Primary | ICD-10-CM

## 2017-03-10 DIAGNOSIS — Z00.00 ROUTINE GENERAL MEDICAL EXAMINATION AT A HEALTH CARE FACILITY: ICD-10-CM

## 2017-03-10 DIAGNOSIS — D64.9 ANEMIA, UNSPECIFIED TYPE: ICD-10-CM

## 2017-03-10 DIAGNOSIS — I10 ESSENTIAL HYPERTENSION, BENIGN: ICD-10-CM

## 2017-03-10 DIAGNOSIS — R63.4 WEIGHT DECREASING: ICD-10-CM

## 2017-03-10 LAB
ALBUMIN SERPL BCP-MCNC: 3.7 G/DL (ref 3.4–5)
ALBUMIN/GLOB SERPL: 0.7 {RATIO} (ref 0.8–1.7)
ALP SERPL-CCNC: 110 U/L (ref 45–117)
ALT SERPL-CCNC: 32 U/L (ref 13–56)
ANION GAP BLD CALC-SCNC: 4 MMOL/L (ref 3–18)
AST SERPL W P-5'-P-CCNC: 27 U/L (ref 15–37)
BASOPHILS # BLD AUTO: 0 K/UL (ref 0–0.06)
BASOPHILS # BLD: 0 % (ref 0–2)
BILIRUB SERPL-MCNC: 0.3 MG/DL (ref 0.2–1)
BUN SERPL-MCNC: 18 MG/DL (ref 7–18)
BUN/CREAT SERPL: 21 (ref 12–20)
CALCIUM SERPL-MCNC: 11.8 MG/DL (ref 8.5–10.1)
CHLORIDE SERPL-SCNC: 102 MMOL/L (ref 100–108)
CO2 SERPL-SCNC: 30 MMOL/L (ref 21–32)
CORTIS SERPL-MCNC: 9.7 UG/DL (ref 3.09–22.4)
CREAT SERPL-MCNC: 0.87 MG/DL (ref 0.6–1.3)
DIFFERENTIAL METHOD BLD: ABNORMAL
EOSINOPHIL # BLD: 0.2 K/UL (ref 0–0.4)
EOSINOPHIL NFR BLD: 4 % (ref 0–5)
ERYTHROCYTE [DISTWIDTH] IN BLOOD BY AUTOMATED COUNT: 19.1 % (ref 11.6–14.5)
FERRITIN SERPL-MCNC: 238 NG/ML (ref 8–388)
GLOBULIN SER CALC-MCNC: 5 G/DL (ref 2–4)
GLUCOSE SERPL-MCNC: 99 MG/DL (ref 74–99)
HCT VFR BLD AUTO: 28 % (ref 35–45)
HGB BLD-MCNC: 8.3 G/DL (ref 12–16)
LYMPHOCYTES # BLD AUTO: 35 % (ref 21–52)
LYMPHOCYTES # BLD: 1.6 K/UL (ref 0.9–3.6)
MCH RBC QN AUTO: 26.7 PG (ref 24–34)
MCHC RBC AUTO-ENTMCNC: 29.6 G/DL (ref 31–37)
MCV RBC AUTO: 90 FL (ref 74–97)
MONOCYTES # BLD: 0.8 K/UL (ref 0.05–1.2)
MONOCYTES NFR BLD AUTO: 18 % (ref 3–10)
NEUTS SEG # BLD: 1.9 K/UL (ref 1.8–8)
NEUTS SEG NFR BLD AUTO: 43 % (ref 40–73)
PLATELET # BLD AUTO: 185 K/UL (ref 135–420)
PMV BLD AUTO: 10.3 FL (ref 9.2–11.8)
POTASSIUM SERPL-SCNC: 3.8 MMOL/L (ref 3.5–5.5)
PROT SERPL-MCNC: 8.7 G/DL (ref 6.4–8.2)
RBC # BLD AUTO: 3.11 M/UL (ref 4.2–5.3)
SODIUM SERPL-SCNC: 136 MMOL/L (ref 136–145)
TSH SERPL DL<=0.05 MIU/L-ACNC: 2.47 UIU/ML (ref 0.36–3.74)
WBC # BLD AUTO: 4.6 K/UL (ref 4.6–13.2)

## 2017-03-10 PROCEDURE — 82728 ASSAY OF FERRITIN: CPT | Performed by: INTERNAL MEDICINE

## 2017-03-10 PROCEDURE — 80053 COMPREHEN METABOLIC PANEL: CPT | Performed by: INTERNAL MEDICINE

## 2017-03-10 PROCEDURE — 82533 TOTAL CORTISOL: CPT | Performed by: INTERNAL MEDICINE

## 2017-03-10 PROCEDURE — 84443 ASSAY THYROID STIM HORMONE: CPT | Performed by: INTERNAL MEDICINE

## 2017-03-10 PROCEDURE — 85025 COMPLETE CBC W/AUTO DIFF WBC: CPT | Performed by: INTERNAL MEDICINE

## 2017-03-10 RX ORDER — CHOLECALCIFEROL (VITAMIN D3) 125 MCG
CAPSULE ORAL
COMMUNITY
End: 2018-02-05 | Stop reason: ALTCHOICE

## 2017-03-10 NOTE — PROGRESS NOTES
This is an Initial Medicare Annual Wellness Exam (AWV) (Performed 12 months after IPPE or effective date of Medicare Part B enrollment, Once in a lifetime)    I have reviewed the patient's medical history in detail and updated the computerized patient record. History     The patient complains of rapid weight loss of 18 pounds over the past 2 months despite a good appetite   The patient has no other complaints      Past Medical History:   Diagnosis Date    Acute bronchitis     Acute upper respiratory infections of unspecified site     Cough     Essential hypertension, benign     Meniere's disease, unspecified     Muscle pain     Pain in limb       Past Surgical History:   Procedure Laterality Date    HX HYSTERECTOMY      HX MOHS PROCEDURES Left      Current Outpatient Prescriptions   Medication Sig Dispense Refill    multivit-mineral-iron-lutein (CENTRUM SILVER ULTRA WOMEN'S) tab tablet Take 1 Tab by mouth daily.  naproxen sodium (ALEVE) 220 mg cap Take  by mouth.  hydroCHLOROthiazide (MICROZIDE) 12.5 mg capsule TAKE ONE CAPSULE BY MOUTH ONCE DAILY IN THE MORNING 90 Cap 0    clotrimazole-betamethasone (LOTRISONE) topical cream APPLY TO AFFECTED AREA TWICE PER DAY 45 g 2    tiotropium bromide (SPIRIVA RESPIMAT) 1.25 mcg/actuation inhaler Take 2 Puffs by inhalation daily. 3 Inhaler 3    sertraline (ZOLOFT) 50 mg tablet TAKE ONE TABLET BY MOUTH ONCE DAILY 30 Tab 0    conjugated estrogens (PREMARIN) 0.3 mg tablet Take 0.3 mg by mouth daily.  cholecalciferol (VITAMIN D3) 1,000 unit cap Take  by mouth daily.  cyanocobalamin 1,000 mcg tablet Take 1,000 mcg by mouth daily.  fluticasone (FLONASE) 50 mcg/actuation nasal spray 1 spray each nostril daily 1 Bottle 1    diazepam (VALIUM) 5 mg tablet Take 5 mg by mouth every six (6) hours as needed for Anxiety.  cetirizine (ZYRTEC) 10 mg tablet Take  by mouth.       ciprofloxacin-dexamethasone (CIPRODEX) 0.3-0.1 % otic suspension 5 drops in right ear twice per day 7.5 mL 1     Allergies   Allergen Reactions    Penicillin V Potassium Diarrhea     States not allergic    Shellfish Derived Swelling     Family History   Problem Relation Age of Onset    Cancer Father      LUNG     Social History   Substance Use Topics    Smoking status: Never Smoker    Smokeless tobacco: Never Used    Alcohol use No     Patient Active Problem List   Diagnosis Code    Pain in limb M79.609    Essential hypertension, benign I10    Cough R05    Muscle pain M79.1    Meniere's disease, unspecified H81.09    Bronchitis J40    Spinal stenosis of lumbar region M48.06    Unresolved grief F43.21    ILD (interstitial lung disease) ([facility] [address] J84.9    Bronchiectasis without complication ([facility] A47.4         Depression Risk Factor Screening:     PHQ 2 / 9, over the last two weeks 10/20/2016   Little interest or pleasure in doing things More than half the days   Feeling down, depressed or hopeless More than half the days   Total Score PHQ 2 4   Trouble falling or staying asleep, or sleeping too much Nearly every day   Feeling tired or having little energy Nearly every day   Poor appetite or overeating Not at all   Feeling bad about yourself - or that you are a failure or have let yourself or your family down Not at all   Trouble concentrating on things such as school, work, reading or watching TV Not at all   Moving or speaking so slowly that other people could have noticed; or the opposite being so fidgety that others notice Several days   Thoughts of being better off dead, or hurting yourself in some way Not at all   PHQ 9 Score 11   How difficult have these problems made it for you to do your work, take care of your home and get along with others Not difficult at all     Alcohol Risk Factor Screening: On any occasion during the past 3 months, have you had more than 3 drinks containing alcohol? No    Do you average more than 7 drinks per week?   No    Functional Ability and Level of Safety:     Hearing Loss   severe-to-profound    Activities of Daily Living   Self-care. Requires assistance with: no ADLs    Fall Risk     Fall Risk Assessment, last 12 mths 10/20/2016   Able to walk? Yes   Fall in past 12 months? -     Abuse Screen   Patient is not abused    Review of Systems   A comprehensive review of systems was negative except for that written in the HPI. Physical Examination     No exam data present    Evaluation of Cognitive Function:  Mood/affect:  sad  Appearance: age appropriate  Family member/caregiver input: None    Visit Vitals    /60 (BP 1 Location: Left arm, BP Patient Position: Sitting)    Pulse 76    Temp 97.8 °F (36.6 °C) (Tympanic)    Resp 16    Ht 5' 5\" (1.651 m)    Wt 144 lb (65.3 kg)    SpO2 98%    BMI 23.96 kg/m2     General appearance: alert, cooperative, no distress, appears stated age  Neck: supple, symmetrical, trachea midline, no adenopathy, thyroid: not enlarged, symmetric, no tenderness/mass/nodules, no carotid bruit and no JVD  Back: symmetric, no curvature. ROM normal. No CVA tenderness. Lungs: clear to auscultation bilaterally  Heart: regular rate and rhythm, S1, S2 normal, no murmur, click, rub or gallop  Abdomen: soft, non-tender. Bowel sounds normal. No masses,  no organomegaly  Extremities: extremities normal, atraumatic, no cyanosis or edema  Skin: Skin color, texture, turgor normal. No rashes or lesions  Lymph nodes: Cervical, supraclavicular, are normal.    Patient Care Team:  Paul Gr MD as PCP - General (Internal Medicine)  Alex Malagon MD (Pulmonary Disease)    Advice/Referrals/Counseling   Education and counseling provided:  Are appropriate based on today's review and evaluation  Advanced Directive on File    Assessment/Plan       ICD-10-CM ICD-9-CM    1.  Weight decreasing R63.4 783.21 CBC WITH AUTOMATED DIFF      METABOLIC PANEL, COMPREHENSIVE      TSH 3RD GENERATION      CORTISOL      CO COLLECTION VENOUS BLOOD,VENIPUNCTURE   2. Essential hypertension, benign I10 401.1 CBC WITH AUTOMATED DIFF      METABOLIC PANEL, COMPREHENSIVE      IN COLLECTION VENOUS BLOOD,VENIPUNCTURE   3. Anemia, unspecified type D64.9 285.9 FERRITIN      IN COLLECTION VENOUS BLOOD,VENIPUNCTURE   4. Routine general medical examination at a health care facility Z00.00 V70.0 IN COLLECTION VENOUS BLOOD,VENIPUNCTURE   5. Screening for alcoholism Z13.89 V79.1 IN COLLECTION VENOUS BLOOD,VENIPUNCTURE       Labs  she was advised to continue her maintenance medications  Further plans will depend on the lab results    Recheck in 2 weeks    I asked Nik Helms if she has any questions and I answered the questions. Dao Helms states that she understands the treatment plan and agrees with the treatment plan

## 2017-03-10 NOTE — PATIENT INSTRUCTIONS
Health Maintenance Due   Topic    DTaP/Tdap/Td series (1 - Tdap)    MEDICARE YEARLY EXAM        Medicare Part B Preventive Services Limitations Recommendation Scheduled   Bone Mass Measurement  (age 72 & older, biennial) Requires diagnosis related to osteoporosis or estrogen deficiency. Biennial benefit unless patient has history of long-term glucocorticoid tx or baseline is needed because initial test was by other method     Cardiovascular Screening Blood Tests (every 5 years)  Total cholesterol, HDL, Triglycerides Order as a panel if possible     Colorectal Cancer Screening  -Fecal occult blood test (annual)  -Flexible sigmoidoscopy (5y)  -Screening colonoscopy (10y)  -Barium Enema      Counseling to Prevent Tobacco Use (up to 8 sessions per year)  - Counseling greater than 3 and up to 10 minutes  - Counseling greater than 10 minutes Patients must be asymptomatic of tobacco-related conditions to receive as preventive service     Diabetes Screening Tests (at least every 3 years, Medicare covers annually or at 6-month intervals for prediabetic patients)    Fasting blood sugar (FBS) or glucose tolerance test (GTT) Patient must be diagnosed with one of the following:  -Hypertension, Dyslipidemia, obesity, previous impaired FBS or GTT  Or any two of the following: overweight, FH of diabetes, age ? 72, history of gestational diabetes, birth of baby weighing more than 9 pounds     Diabetes Self-Management Training (DSMT) (no USPSTF recommendation) Requires referral by treating physician for patient with diabetes or renal disease. 10 hours of initial DSMT session of no less than 30 minutes each in a continuous 12-month period. 2 hours of follow-up DSMT in subsequent years.      Glaucoma Screening (no USPSTF recommendation) Diabetes mellitus, family history, , age 48 or over,  American, age 72 or over     Human Immunodeficiency Virus (HIV) Screening (annually for increased risk patients)  HIV-1 and HIV-2 by EIA, PETRA, rapid antibody test, or oral mucosa transudate Patient must be at increased risk for HIV infection per USPSTF guidelines or pregnant. Tests covered annually for patients at increased risk. Pregnant patients may receive up to 3 test during pregnancy. Medical Nutrition Therapy (MNT) (for diabetes or renal disease not recommended schedule) Requires referral by treating physician for patient with diabetes or renal disease. Can be provided in same year as diabetes self-management training (DSMT), and CMS recommends medical nutrition therapy take place after DSMT. Up to 3 hours for initial year and 2 hours in subsequent years. Shingles Vaccination A shingles vaccine is also recommended once in a lifetime after age 61     Seasonal Influenza Vaccination (annually)      Pneumococcal Vaccination (once after 72)      Hepatitis B Vaccinations (if medium/high risk) Medium/high risk factors:  End-stage renal disease,  Hemophiliacs who received Factor VIII or IX concentrates, Clients of institutions for the mentally retarded, Persons who live in the same house as a HepB virus carrier, Homosexual men, Illicit injectable drug abusers. Screening Mammography (biennial age 54-69) Annually (age 36 or over)     Screening Pap Tests and Pelvic Examination (up to age 79 and after 79 if unknown history or abnormal study last 10 years) Every 25 months except high risk     Ultrasound Screening for Abdominal Aortic Aneurysm (AAA) (once) Patient must be referred through [facility] and not have had a screening for abdominal aortic aneurysm before under Medicare.   Limited to patients who meet one of the following criteria:  - Men who are 73-68 years old and have smoked more than 100 cigarettes in their lifetime.  -Anyone with a FH of AAA  -Anyone recommended for screening by USPSTF

## 2017-03-10 NOTE — MR AVS SNAPSHOT
Visit Information Date & Time Provider Department Dept. Phone Encounter #  
 3/10/2017 11:15 AM Byron Campbell MD [facility] [place] [phone] [id number] Your Appointments 3/23/2017  1:15 PM  
Follow Up with Byron Campbell MD  
[facility] OF [place] [address] [facility]) Appt Note: 1 wk f/u  
 [address], [address] [place] [address]  
  
   
 [address], [address] [place] [address] 5/30/2017  2:00 PM  
Follow Up with Byron Campbell MD  
[facility] [address]) Appt Note: 4mo  
 [address], [address] [place] [address]  
[phone] Upcoming Health Maintenance Date Due DTaP/Tdap/Td series (1 - Tdap) 7/25/1989 MEDICARE YEARLY EXAM 7/23/1996 GLAUCOMA SCREENING Q2Y 2/1/2018 Allergies as of 3/10/2017  Review Complete On: 2/1/2017 By: Opal Tolbert MD  
  
 Severity Noted Reaction Type Reactions Penicillin V Potassium    Diarrhea States not allergic Shellfish Derived  04/21/2016    Swelling Current Immunizations  Reviewed on 12/12/2016 Name Date Influenza High Dose Vaccine PF 12/12/2016 Influenza Vaccine (Quad) PF 9/25/2015  1:00 PM  
 Pneumococcal Polysaccharide (PPSV-23) 9/19/2011 Td 7/24/1989 Zoster Vaccine, Live 2/8/2008 Not reviewed this visit You Were Diagnosed With   
  
 Codes Comments Weight decreasing    -  Primary ICD-10-CM: R63.4 ICD-9-CM: 783.21 Essential hypertension, benign     ICD-10-CM: I10 
ICD-9-CM: 401.1 Anemia, unspecified type     ICD-10-CM: D64.9 ICD-9-CM: 285.9 Routine general medical examination at a health care facility     ICD-10-CM: Z00.00 ICD-9-CM: V70.0 Screening for alcoholism     ICD-10-CM: Z13.89 ICD-9-CM: V79.1 Vitals BP Pulse Temp Resp Height(growth percentile) 144/60 (BP 1 Location: Left arm, BP Patient Position: Sitting) 76 97.8 °F (36.6 °C) (Tympanic) 16 5' 5\" (1.651 m) Weight(growth percentile) SpO2 BMI OB Status Smoking Status 144 lb (65.3 kg) 98% 23.96 kg/m2 Hysterectomy Never Smoker Vitals History BMI and BSA Data Body Mass Index Body Surface Area  
 23.96 kg/m 2 1.73 m 2 Preferred Pharmacy Pharmacy Name Phone [phone] [facility] [phone] Your Updated Medication List  
  
   
This list is accurate as of: 3/10/17  1:02 PM.  Always use your most recent med list.  
  
  
  
  
 ALEVE 220 mg Cap Generic drug:  naproxen sodium Take  by mouth. CENTRUM SILVER ULTRA WOMEN'S Tab tablet Generic drug:  multivit-mineral-iron-lutein Take 1 Tab by mouth daily. ciprofloxacin-dexamethasone 0.3-0.1 % otic suspension Commonly known as:  CIPRODEX  
5 drops in right ear twice per day  
  
 clotrimazole-betamethasone topical cream  
Commonly known as:  LOTRISONE  
APPLY TO AFFECTED AREA TWICE PER DAY  
  
 cyanocobalamin 1,000 mcg tablet Take 1,000 mcg by mouth daily. diazePAM 5 mg tablet Commonly known as:  VALIUM Take 5 mg by mouth every six (6) hours as needed for Anxiety. fluticasone 50 mcg/actuation nasal spray Commonly known as:  FLONASE  
1 spray each nostril daily  
  
 hydroCHLOROthiazide 12.5 mg capsule Commonly known as:  Norval [place] TAKE ONE CAPSULE BY MOUTH ONCE DAILY IN THE MORNING  
  
 PREMARIN 0.3 mg tablet Generic drug:  conjugated estrogens Take 0.3 mg by mouth daily. sertraline 50 mg tablet Commonly known as:  ZOLOFT  
TAKE ONE TABLET BY MOUTH ONCE DAILY  
  
 tiotropium bromide 1.25 mcg/actuation inhaler Commonly known as:  Elysia Buys Take 2 Puffs by inhalation daily. VITAMIN D3 1,000 unit Cap Generic drug:  cholecalciferol Take  by mouth daily. ZyrTEC 10 mg tablet Generic drug:  cetirizine Take  by mouth. Patient Instructions Health Maintenance Due Topic  DTaP/Tdap/Td series (1 - Tdap)  MEDICARE YEARLY EXAM   
 
 
Medicare Part B Preventive Services Limitations Recommendation Scheduled Bone Mass Measurement 
(age 72 & older, biennial) Requires diagnosis related to osteoporosis or estrogen deficiency. Biennial benefit unless patient has history of long-term glucocorticoid tx or baseline is needed because initial test was by other method Cardiovascular Screening Blood Tests (every 5 years) Total cholesterol, HDL, Triglycerides Order as a panel if possible Colorectal Cancer Screening 
-Fecal occult blood test (annual) -Flexible sigmoidoscopy (5y) 
-Screening colonoscopy (10y) -Barium Enema Counseling to Prevent Tobacco Use (up to 8 sessions per year) - Counseling greater than 3 and up to 10 minutes - Counseling greater than 10 minutes Patients must be asymptomatic of tobacco-related conditions to receive as preventive service Diabetes Screening Tests (at least every 3 years, Medicare covers annually or at 6-month intervals for prediabetic patients) Fasting blood sugar (FBS) or glucose tolerance test (GTT) Patient must be diagnosed with one of the following: 
-Hypertension, Dyslipidemia, obesity, previous impaired FBS or GTT 
Or any two of the following: overweight, FH of diabetes, age ? 72, history of gestational diabetes, birth of baby weighing more than 9 pounds Diabetes Self-Management Training (DSMT) (no USPSTF recommendation) Requires referral by treating physician for patient with diabetes or renal disease. 10 hours of initial DSMT session of no less than 30 minutes each in a continuous 12-month period. 2 hours of follow-up DSMT in subsequent years. Glaucoma Screening (no USPSTF recommendation) Diabetes mellitus, family history, , age 48 or over,  American, age 72 or over Human Immunodeficiency Virus (HIV) Screening (annually for increased risk patients) HIV-1 and HIV-2 by EIA, PETRA, rapid antibody test, or oral mucosa transudate Patient must be at increased risk for HIV infection per USPSTF guidelines or pregnant. Tests covered annually for patients at increased risk. Pregnant patients may receive up to 3 test during pregnancy. Medical Nutrition Therapy (MNT) (for diabetes or renal disease not recommended schedule) Requires referral by treating physician for patient with diabetes or renal disease. Can be provided in same year as diabetes self-management training (DSMT), and CMS recommends medical nutrition therapy take place after DSMT. Up to 3 hours for initial year and 2 hours in subsequent years. Shingles Vaccination A shingles vaccine is also recommended once in a lifetime after age 61 Seasonal Influenza Vaccination (annually) Pneumococcal Vaccination (once after 65) Hepatitis B Vaccinations (if medium/high risk) Medium/high risk factors:  End-stage renal disease, Hemophiliacs who received Factor VIII or IX concentrates, Clients of institutions for the mentally retarded, Persons who live in the same house as a HepB virus carrier, Homosexual men, Illicit injectable drug abusers. Screening Mammography (biennial age 54-69) Annually (age 36 or over) Screening Pap Tests and Pelvic Examination (up to age 79 and after 79 if unknown history or abnormal study last 10 years) Every 24 months except high risk Ultrasound Screening for Abdominal Aortic Aneurysm (AAA) (once) Patient must be referred through IPPE and not have had a screening for abdominal aortic aneurysm before under Medicare. Limited to patients who meet one of the following criteria: 
- Men who are 73-68 years old and have smoked more than 100 cigarettes in their lifetime. 
-Anyone with a FH of AAA 
-Anyone recommended for screening by USPSTF Please provide this summary of care documentation to your next provider. Your primary care clinician is listed as Zakiya Burgos. If you have any questions after today's visit, please call [phone].

## 2017-03-10 NOTE — PROGRESS NOTES
1. Have you been to the ER, urgent care clinic since your last visit? Hospitalized since your last visit? No    2. Have you seen or consulted any other health care providers outside of the [address] [place] [address] since your last visit? Include any pap smears or colon screening.  No

## 2017-03-13 ENCOUNTER — LAB ONLY (OUTPATIENT)
Dept: INTERNAL MEDICINE CLINIC | Age: 82
End: 2017-03-13

## 2017-03-13 ENCOUNTER — HOSPITAL ENCOUNTER (OUTPATIENT)
Dept: LAB | Age: 82
Discharge: HOME | End: 2017-03-13
Payer: MEDICARE

## 2017-03-13 DIAGNOSIS — E83.52 HYPERCALCEMIA: ICD-10-CM

## 2017-03-13 DIAGNOSIS — D64.9 ANEMIA, UNSPECIFIED TYPE: Primary | ICD-10-CM

## 2017-03-13 DIAGNOSIS — D64.9 ANEMIA, UNSPECIFIED TYPE: ICD-10-CM

## 2017-03-13 LAB
CALCIUM SERPL-MCNC: 13.2 MG/DL (ref 8.5–10.1)
PTH-INTACT SERPL-MCNC: 10.7 PG/ML (ref 14–72)

## 2017-03-13 PROCEDURE — 84155 ASSAY OF PROTEIN SERUM: CPT | Performed by: INTERNAL MEDICINE

## 2017-03-13 PROCEDURE — 83970 ASSAY OF PARATHORMONE: CPT | Performed by: INTERNAL MEDICINE

## 2017-03-13 PROCEDURE — 82784 ASSAY IGA/IGD/IGG/IGM EACH: CPT | Performed by: INTERNAL MEDICINE

## 2017-03-14 ENCOUNTER — DOCUMENTATION ONLY (OUTPATIENT)
Dept: INTERNAL MEDICINE CLINIC | Age: 82
End: 2017-03-14

## 2017-03-14 DIAGNOSIS — M25.551 RIGHT HIP PAIN: Primary | ICD-10-CM

## 2017-03-14 DIAGNOSIS — M79.604 RIGHT LEG PAIN: ICD-10-CM

## 2017-03-14 LAB
ALBUMIN SERPL ELPH-MCNC: 3.7 G/DL (ref 2.9–4.4)
ALBUMIN/GLOB SERPL: 0.8 {RATIO} (ref 0.7–1.7)
ALPHA1 GLOB SERPL ELPH-MCNC: 0.3 G/DL (ref 0–0.4)
ALPHA2 GLOB SERPL ELPH-MCNC: 0.8 G/DL (ref 0.4–1)
B-GLOBULIN SERPL ELPH-MCNC: 0.9 G/DL (ref 0.7–1.3)
GAMMA GLOB SERPL ELPH-MCNC: 2.6 G/DL (ref 0.4–1.8)
GLOBULIN SER CALC-MCNC: 4.6 G/DL (ref 2.2–3.9)
IGA SERPL-MCNC: 20 MG/DL (ref 64–422)
IGG SERPL-MCNC: 3093 MG/DL (ref 700–1600)
IGM SERPL-MCNC: 22 MG/DL (ref 26–217)
M PROTEIN SERPL ELPH-MCNC: 2.4 G/DL
PROT SERPL-MCNC: 8.3 G/DL (ref 6–8.5)

## 2017-03-15 ENCOUNTER — HOSPITAL ENCOUNTER (OUTPATIENT)
Dept: GENERAL RADIOLOGY | Age: 82
Discharge: HOME | End: 2017-03-15
Payer: MEDICARE

## 2017-03-15 ENCOUNTER — HOSPITAL ENCOUNTER (OUTPATIENT)
Dept: INFUSION THERAPY | Age: 82
Discharge: HOME | End: 2017-03-15
Payer: MEDICARE

## 2017-03-15 VITALS
TEMPERATURE: 97.9 F | OXYGEN SATURATION: 97 % | HEIGHT: 65 IN | DIASTOLIC BLOOD PRESSURE: 61 MMHG | SYSTOLIC BLOOD PRESSURE: 123 MMHG | RESPIRATION RATE: 18 BRPM | BODY MASS INDEX: 23.99 KG/M2 | HEART RATE: 57 BPM | WEIGHT: 144 LBS

## 2017-03-15 DIAGNOSIS — M79.604 RIGHT LEG PAIN: ICD-10-CM

## 2017-03-15 DIAGNOSIS — M25.551 RIGHT HIP PAIN: ICD-10-CM

## 2017-03-15 PROCEDURE — 96366 THER/PROPH/DIAG IV INF ADDON: CPT

## 2017-03-15 PROCEDURE — 96365 THER/PROPH/DIAG IV INF INIT: CPT

## 2017-03-15 PROCEDURE — 74011250636 HC RX REV CODE- 250/636: Performed by: INTERNAL MEDICINE

## 2017-03-15 RX ORDER — FAMOTIDINE 10 MG/ML
20 INJECTION INTRAVENOUS
Status: ACTIVE | OUTPATIENT
Start: 2017-03-15 | End: 2017-03-15

## 2017-03-15 RX ORDER — SODIUM CHLORIDE 9 MG/ML
125 INJECTION, SOLUTION INTRAVENOUS ONCE
Status: COMPLETED | OUTPATIENT
Start: 2017-03-15 | End: 2017-03-15

## 2017-03-15 RX ORDER — SODIUM CHLORIDE 0.9 % (FLUSH) 0.9 %
10-40 SYRINGE (ML) INJECTION AS NEEDED
Status: DISPENSED | OUTPATIENT
Start: 2017-03-15 | End: 2017-03-15

## 2017-03-15 RX ORDER — ZOLEDRONIC ACID 5 MG/100ML
5 INJECTION, SOLUTION INTRAVENOUS ONCE
Status: COMPLETED | OUTPATIENT
Start: 2017-03-15 | End: 2017-03-15

## 2017-03-15 RX ORDER — DIPHENHYDRAMINE HYDROCHLORIDE 50 MG/ML
25 INJECTION, SOLUTION INTRAMUSCULAR; INTRAVENOUS
Status: ACTIVE | OUTPATIENT
Start: 2017-03-15 | End: 2017-03-15

## 2017-03-15 RX ORDER — SODIUM CHLORIDE 9 MG/ML
125 INJECTION, SOLUTION INTRAVENOUS ONCE
Status: DISPENSED | OUTPATIENT
Start: 2017-03-15 | End: 2017-03-15

## 2017-03-15 RX ADMIN — Medication 10 ML: at 11:45

## 2017-03-15 RX ADMIN — SODIUM CHLORIDE 125 ML/HR: 9 INJECTION, SOLUTION INTRAVENOUS at 12:11

## 2017-03-15 RX ADMIN — ZOLEDRONIC ACID 5 MG: 5 INJECTION, SOLUTION INTRAVENOUS at 11:54

## 2017-03-15 NOTE — PROGRESS NOTES
[facility] Progress Note    Date: March 15, 2017    Name: Deloris Balderas    MRN: [id number]         : 1931      Patient assessed and education was provided. Here for reclast and normal saline infusion. Care notes given. Patient with severe bilateral hearing impairment. Daughter here and advocating for patient. Patient without complaints or distress    Patient vitals were reviewed. Visit Vitals    /61 (BP 1 Location: Left arm, BP Patient Position: At rest)    Pulse (!) 57    Temp 97.9 °F (36.6 °C)    Resp 18    Ht 5' 5\" (1.651 m)    Wt 65.3 kg (144 lb)    SpO2 97%    BMI 23.96 kg/m2       Labs sent from md office reviewed and within parameters for reclast. Same scanned into [facility]. CR CL 54ml/min per pharmacist Che      PIV obtained to [facility] with # 22 gauge needle      reclast 5 mg/100 ml infused over 15 minutes. Tolerated well    Normal saline hung to run at 125 cc/hr x 4 hours as ordered. Tolerated well      Patient Vitals for the past 8 hrs:   Temp Pulse Resp BP SpO2   03/15/17 1605 97.9 °F (36.6 °C) (!) 57 18 123/61 97 %   03/15/17 1211 97.5 °F (36.4 °C) 63 18 125/64 -   03/15/17 1110 97.8 °F (36.6 °C) 74 18 145/71 98 %          PIV removed. Site s signs of infection or infiltration.  bandaid applied to site       Patient was discharged from [place] [address] in stable condition at 1620 pm. She has no futher  Appointment with [facility] fela Staton RN  March 15, 2017  4:25 PM

## 2017-03-15 NOTE — PROGRESS NOTES
Discussed with the patient and her daughter. I reviewed the abnormal labs and told them my strong suspicion that the patient has multiple myeloma. I explained the fact that all the tests are not yet returned. I reviewed the bao calcium result and explained the risk of hypercalcemia. I also informed them of the apparent rise of the calcium. I advised not to wait for the the remaining tests but to proceed with measures to urgently lower the calcium and to proceed with an oncology appointment. The patient agreed.

## 2017-03-21 ENCOUNTER — TELEPHONE (OUTPATIENT)
Dept: INTERNAL MEDICINE CLINIC | Age: 82
End: 2017-03-21

## 2017-03-21 NOTE — TELEPHONE ENCOUNTER
Dr. Bee Ham made aware of message and patient was notified of appointment with Dr. Dionicio Heller Wednesday, 3/22 at 12:45.

## 2017-03-21 NOTE — TELEPHONE ENCOUNTER
Patient states she had infusion last week and has had a rash in her groin area since infusion. She had put medicine on it, not helping, bleeding a little now. She also has had a headache behind her eyes. Please advise if she needs to come in for appointment.

## 2017-03-22 ENCOUNTER — OFFICE VISIT (OUTPATIENT)
Dept: INTERNAL MEDICINE CLINIC | Age: 82
End: 2017-03-22

## 2017-03-22 VITALS
SYSTOLIC BLOOD PRESSURE: 149 MMHG | RESPIRATION RATE: 16 BRPM | WEIGHT: 144 LBS | HEIGHT: 65 IN | OXYGEN SATURATION: 98 % | HEART RATE: 72 BPM | DIASTOLIC BLOOD PRESSURE: 54 MMHG | BODY MASS INDEX: 23.99 KG/M2 | TEMPERATURE: 98.4 F

## 2017-03-22 DIAGNOSIS — R21 RASH OF GROIN: Primary | ICD-10-CM

## 2017-03-22 DIAGNOSIS — H57.11 EYE PAIN, RIGHT: ICD-10-CM

## 2017-03-22 RX ORDER — FLUCONAZOLE 100 MG/1
TABLET ORAL
Qty: 5 TAB | Refills: 0 | Status: SHIPPED | OUTPATIENT
Start: 2017-03-22 | End: 2017-07-11 | Stop reason: ALTCHOICE

## 2017-03-22 RX ORDER — ACETAMINOPHEN 325 MG/1
TABLET ORAL
COMMUNITY

## 2017-03-22 RX ORDER — SILVER SULFADIAZINE 10 G/1000G
CREAM TOPICAL
Qty: 50 G | Refills: 1 | Status: SHIPPED | OUTPATIENT
Start: 2017-03-22 | End: 2018-01-02 | Stop reason: ALTCHOICE

## 2017-03-22 NOTE — PROGRESS NOTES
1. Have you been to the ER, urgent care clinic since your last visit? Hospitalized since your last visit? No    2. Have you seen or consulted any other health care providers outside of the [address] [place] [address] since your last visit? Include any pap smears or colon screening.  No

## 2017-03-22 NOTE — MR AVS SNAPSHOT
Visit Information Date & Time Provider Department Dept. Phone Encounter #  
 3/22/2017 12:45 PM Denice Kaye MD [facility] INTERNAL MEDICINE OF [place] [id number] Your Appointments 5/30/2017  2:00 PM  
Follow Up with Denice Kaye MD  
[address] [facility]) Appt Note: 4mo  
 [address], [address]  
  
   
 [address], [address] [place] [address] Upcoming Health Maintenance Date Due DTaP/Tdap/Td series (1 - Tdap) 7/25/1989 Pneumococcal 65+ High/Highest Risk (2 of 2 - PCV13) 9/19/2012 GLAUCOMA SCREENING Q2Y 2/1/2018 MEDICARE YEARLY EXAM 3/11/2018 Allergies as of 3/22/2017  Review Complete On: 3/22/2017 By: Nikolas Carter LPN Severity Noted Reaction Type Reactions Penicillin V Potassium    Diarrhea States not allergic Shellfish Derived  04/21/2016    Swelling Current Immunizations  Reviewed on 3/15/2017 Name Date Influenza High Dose Vaccine PF 12/12/2016 Influenza Vaccine (Quad) PF 9/25/2015  1:00 PM  
 Pneumococcal Polysaccharide (PPSV-23) 9/19/2011 Td 7/24/1989 Zoster Vaccine, Live 2/8/2008 Not reviewed this visit Vitals BP Pulse Temp Resp Height(growth percentile) 149/54 (BP 1 Location: Left arm, BP Patient Position: Sitting) 72 98.4 °F (36.9 °C) (Tympanic) 16 5' 5\" (1.651 m) Weight(growth percentile) SpO2 BMI OB Status Smoking Status 144 lb (65.3 kg) 98% 23.96 kg/m2 Hysterectomy Never Smoker BMI and BSA Data Body Mass Index Body Surface Area  
 23.96 kg/m 2 1.73 m 2 Preferred Pharmacy Pharmacy Name Phone [facility] [address] [facility] [phone] Your Updated Medication List  
  
   
This list is accurate as of: 3/22/17  1:48 PM.  Always use your most recent med list.  
  
  
  
  
 ALEVE 220 mg Cap Generic drug:  naproxen sodium Take  by mouth. CENTRUM SILVER ULTRA WOMEN'S Tab tablet Generic drug:  multivit-mineral-iron-lutein Take 1 Tab by mouth daily. clotrimazole-betamethasone topical cream  
Commonly known as:  LOTRISONE  
APPLY TO AFFECTED AREA TWICE PER DAY  
  
 cyanocobalamin 1,000 mcg tablet Take 1,000 mcg by mouth daily. diazePAM 5 mg tablet Commonly known as:  VALIUM Take 5 mg by mouth every six (6) hours as needed for Anxiety. fluconazole 100 mg tablet Commonly known as:  DIFLUCAN  
1 tablet twice per day  
  
 fluticasone 50 mcg/actuation nasal spray Commonly known as:  FLONASE  
1 spray each nostril daily  
  
 hydroCHLOROthiazide 12.5 mg capsule Commonly known as:  [place] Sine TAKE ONE CAPSULE BY MOUTH ONCE DAILY IN THE MORNING  
  
 sertraline 50 mg tablet Commonly known as:  ZOLOFT  
TAKE ONE TABLET BY MOUTH ONCE DAILY  
  
 silver sulfADIAZINE 1 % topical cream  
Commonly known as:  SILVADENE Apply twice per day  
  
 tiotropium bromide 1.25 mcg/actuation inhaler Commonly known as:  Chaidez Rana Take 2 Puffs by inhalation daily. TYLENOL 325 mg tablet Generic drug:  acetaminophen Take  by mouth every four (4) hours as needed for Pain. VITAMIN D3 1,000 unit Cap Generic drug:  cholecalciferol Take  by mouth daily. ZyrTEC 10 mg tablet Generic drug:  cetirizine Take  by mouth. Prescriptions Sent to Pharmacy Refills  
 silver sulfADIAZINE (SILVADENE) 1 % topical cream 1 Sig: Apply twice per day Class: Normal  
 Pharmacy: [address], [address] [place]  [address] Ph #: [phone]  
 fluconazole (DIFLUCAN) 100 mg tablet 0 Si tablet twice per day Class: Normal  
 Pharmacy: [facility], [address] [place] [address] Ph #: [phone] To-Do List   
 2017 12:00 PM  
 Appointment with [facility] CT RM 1 at [facility] RAD CT ([phone]) ORAL CONTRAST/PREP   Oral Contrast/Prep from radiology  no later than one day prior to study date/time. DIET RESTRICTIONS  Nothing to eat or drink, 4 hours prior to study  May have water to take meds  May drink oral contrast if directed  GENERAL INSTRUCTIONS  If you were given premedications for IV contrast to take prior to having your study, please arrange to have someone drive you to your appointment. If you have had a creatinine level drawn within the past 30 days, please bring most recent results to your appt. MEDICATIONS  Bring a complete list of all medications you are currently taking to include prescriptions, over-the-counter meds, herbals, vitamins & any dietary supplements. RELATED STUDY INFORMATION  Bring any films, CDs, and reports related with you on the day of your exam.  This only includes studies done outside of [address] [place] [address], [facility], [facility], and [facility]. QUESTIONS  Notify the CT Department if you have any questions concerning your study. [address] [phone]  
  
 03/24/2017 1:00 PM  
  Appointment with [facility] FLUORO RM 1 at [address] [place] [address] ([phone]) OUTSIDE FILMS  - Any outside films related to the study being scheduled should be brought with you on the day of the exam.  If this cannot be done there may be a delay in the reading of the study. Patient Instructions Health Maintenance Due Topic  DTaP/Tdap/Td series (1 - Tdap)  Pneumococcal 65+ High/Highest Risk (2 of 2 - PCV13) Please provide this summary of care documentation to your next provider. Your primary care clinician is listed as Venkatesh Chavarria. If you have any questions after today's visit, please call [phone].

## 2017-03-22 NOTE — PATIENT INSTRUCTIONS
Health Maintenance Due   Topic    DTaP/Tdap/Td series (1 - Tdap)    Pneumococcal 65+ High/Highest Risk (2 of 2 - PCV13)

## 2017-03-24 ENCOUNTER — HOSPITAL ENCOUNTER (OUTPATIENT)
Dept: GENERAL RADIOLOGY | Age: 82
Discharge: HOME | End: 2017-03-24
Attending: INTERNAL MEDICINE
Payer: MEDICARE

## 2017-03-24 ENCOUNTER — HOSPITAL ENCOUNTER (OUTPATIENT)
Dept: CT IMAGING | Age: 82
Discharge: HOME | End: 2017-03-24
Attending: INTERNAL MEDICINE
Payer: MEDICARE

## 2017-03-24 DIAGNOSIS — D64.9 ANEMIA: ICD-10-CM

## 2017-03-24 DIAGNOSIS — D47.2 MONOCLONAL PARAPROTEINEMIA: ICD-10-CM

## 2017-03-24 DIAGNOSIS — R63.4 ABNORMAL WEIGHT LOSS: ICD-10-CM

## 2017-03-24 DIAGNOSIS — J84.10 PULMONARY FIBROSIS (HCC): ICD-10-CM

## 2017-03-24 PROCEDURE — 74176 CT ABD & PELVIS W/O CONTRAST: CPT

## 2017-03-24 PROCEDURE — 77075 RADEX OSSEOUS SURVEY COMPL: CPT

## 2017-03-25 NOTE — PROGRESS NOTES
The patient presents to the office today with the chief complaint of right eye pain    HPI    The patient complains of severe right eye pain. The pain has been present for 3 days. There are no inciting events. The patient also complains of a rash in her right groin - an area that has opened and bleeding. Review of Systems   Respiratory: Negative for shortness of breath. Cardiovascular: Negative for leg swelling. Skin: Positive for rash. Allergies   Allergen Reactions    Penicillin V Potassium Diarrhea     States not allergic    Shellfish Derived Swelling       Current Outpatient Prescriptions   Medication Sig Dispense Refill    acetaminophen (TYLENOL) 325 mg tablet Take  by mouth every four (4) hours as needed for Pain.  silver sulfADIAZINE (SILVADENE) 1 % topical cream Apply twice per day 50 g 1    fluconazole (DIFLUCAN) 100 mg tablet 1 tablet twice per day 5 Tab 0    naproxen sodium (ALEVE) 220 mg cap Take  by mouth.  hydroCHLOROthiazide (MICROZIDE) 12.5 mg capsule TAKE ONE CAPSULE BY MOUTH ONCE DAILY IN THE MORNING 90 Cap 0    tiotropium bromide (SPIRIVA RESPIMAT) 1.25 mcg/actuation inhaler Take 2 Puffs by inhalation daily. 3 Inhaler 3    cyanocobalamin 1,000 mcg tablet Take 1,000 mcg by mouth daily.  fluticasone (FLONASE) 50 mcg/actuation nasal spray 1 spray each nostril daily 1 Bottle 1    cetirizine (ZYRTEC) 10 mg tablet Take  by mouth.  multivit-mineral-iron-lutein (CENTRUM SILVER ULTRA WOMEN'S) tab tablet Take 1 Tab by mouth daily.  clotrimazole-betamethasone (LOTRISONE) topical cream APPLY TO AFFECTED AREA TWICE PER DAY 45 g 2    sertraline (ZOLOFT) 50 mg tablet TAKE ONE TABLET BY MOUTH ONCE DAILY 30 Tab 0    cholecalciferol (VITAMIN D3) 1,000 unit cap Take  by mouth daily.  diazepam (VALIUM) 5 mg tablet Take 5 mg by mouth every six (6) hours as needed for Anxiety.          Past Medical History:   Diagnosis Date    Acute bronchitis     Acute upper respiratory infections of unspecified site     Cough     Essential hypertension, benign     Meniere's disease, unspecified     Muscle pain     Pain in limb        Past Surgical History:   Procedure Laterality Date    HX HYSTERECTOMY      HX MOHS PROCEDURES Left        Social History     Social History    Marital status:      Spouse name: N/A    Number of children: N/A    Years of education: N/A     Occupational History    Not on file. Social History Main Topics    Smoking status: Never Smoker    Smokeless tobacco: Never Used    Alcohol use No    Drug use: No    Sexual activity: Not Currently     Other Topics Concern    Not on file     Social History Narrative       Patient does not have an advanced directive on file    Visit Vitals    /54 (BP 1 Location: Left arm, BP Patient Position: Sitting)    Pulse 72    Temp 98.4 °F (36.9 °C) (Tympanic)    Resp 16    Ht 5' 5\" (1.651 m)    Wt 144 lb (65.3 kg)    SpO2 98%    BMI 23.96 kg/m2       Physical Exam   Eyes:   Right eye with no erythema.   Non tender around the right eye   Skin:   An erythematous line with an open sore right groin       BMI:  [facility] Outpatient Visit on 03/13/2017   Component Date Value Ref Range Status    Calcium 03/13/2017 13.2* 8.5 - 10.1 MG/DL Final    Comment: CALLED TO AND CORRECTLY REPEATED BY:  DR. Villarreal [facility] ON 3/13/17 AT 2122 TO WF      PTH, Intact 03/13/2017 10.7* 14.0 - 72.0 pg/mL Final    Protein, total 03/13/2017 8.3  6.0 - 8.5 g/dL Final    Albumin 03/13/2017 3.7  2.9 - 4.4 g/dL Final    Alpha-1-globulin 03/13/2017 0.3  0.0 - 0.4 g/dL Final    ALPHA-2 GLOBULIN 03/13/2017 0.8  0.4 - 1.0 g/dL Final    Beta globulin 03/13/2017 0.9  0.7 - 1.3 g/dL Final    Gamma globulin 03/13/2017 2.6* 0.4 - 1.8 g/dL Final    M-spike 03/13/2017 2.4* Not Observed g/dL Final    Globulin, total 03/13/2017 4.6* 2.2 - 3.9 g/dL Final    A/G ratio 03/13/2017 0.8  0.7 - 1.7   Final    Immunoglobulin G, Qt. 03/13/2017 3093* 700 - 1600 mg/dL Final    Immunoglobulin A, Qt. 03/13/2017 20* 64 - 422 mg/dL Final    Comment: (NOTE)  Result confirmed on concentration.  Immunoglobulin M, Qt. 03/13/2017 22* 26 - 217 mg/dL Final    Comment: (NOTE)  Result confirmed on concentration. Performed At: [address] [id number]  [address] [id number]     Hospital Outpatient Visit on 03/10/2017   Component Date Value Ref Range Status    WBC 03/10/2017 4.6  4.6 - 13.2 K/uL Final    RBC 03/10/2017 3.11* 4.20 - 5.30 M/uL Final    HGB 03/10/2017 8.3* 12.0 - 16.0 g/dL Final    HCT 03/10/2017 28.0* 35.0 - 45.0 % Final    MCV 03/10/2017 90.0  74.0 - 97.0 FL Final    MCH 03/10/2017 26.7  24.0 - 34.0 PG Final    MCHC 03/10/2017 29.6* 31.0 - 37.0 g/dL Final    RDW 03/10/2017 19.1* 11.6 - 14.5 % Final    PLATELET 00/28/4578 463  135 - 420 K/uL Final    MPV 03/10/2017 10.3  9.2 - 11.8 FL Final    NEUTROPHILS 03/10/2017 43  40 - 73 % Final    LYMPHOCYTES 03/10/2017 35  21 - 52 % Final    MONOCYTES 03/10/2017 18* 3 - 10 % Final    EOSINOPHILS 03/10/2017 4  0 - 5 % Final    BASOPHILS 03/10/2017 0  0 - 2 % Final    ABS. NEUTROPHILS 03/10/2017 1.9  1.8 - 8.0 K/UL Final    ABS. LYMPHOCYTES 03/10/2017 1.6  0.9 - 3.6 K/UL Final    ABS. MONOCYTES 03/10/2017 0.8  0.05 - 1.2 K/UL Final    ABS. EOSINOPHILS 03/10/2017 0.2  0.0 - 0.4 K/UL Final    ABS.  BASOPHILS 03/10/2017 0.0  0.0 - 0.06 K/UL Final    DF 03/10/2017 AUTOMATED    Final    Sodium 03/10/2017 136  136 - 145 mmol/L Final    Potassium 03/10/2017 3.8  3.5 - 5.5 mmol/L Final    Chloride 03/10/2017 102  100 - 108 mmol/L Final    CO2 03/10/2017 30  21 - 32 mmol/L Final    Anion gap 03/10/2017 4  3.0 - 18 mmol/L Final    Glucose 03/10/2017 99  74 - 99 mg/dL Final    BUN 03/10/2017 18  7.0 - 18 MG/DL Final    Creatinine 03/10/2017 0.87  0.6 - 1.3 MG/DL Final    BUN/Creatinine ratio 03/10/2017 21* 12 - 20   Final    GFR est AA 03/10/2017 >60  >60 ml/min/1.73m2 Final    GFR est non-AA 03/10/2017 >60  >60 ml/min/1.73m2 Final    Comment: (NOTE)  Estimated GFR is calculated using the Modification of Diet in Renal   Disease (MDRD) Study equation, reported for both  Americans   (GFRAA) and non- Americans (GFRNA), and normalized to 1.73m2   body surface area. The physician must decide which value applies to   the patient. The MDRD study equation should only be used in   individuals age 25 or older. It has not been validated for the   following: pregnant women, patients with serious comorbid conditions,   or on certain medications, or persons with extremes of body size,   muscle mass, or nutritional status.  Calcium 03/10/2017 11.8* 8.5 - 10.1 MG/DL Final    Bilirubin, total 03/10/2017 0.3  0.2 - 1.0 MG/DL Final    ALT (SGPT) 03/10/2017 32  13 - 56 U/L Final    AST (SGOT) 03/10/2017 27  15 - 37 U/L Final    Alk. phosphatase 03/10/2017 110  45 - 117 U/L Final    Protein, total 03/10/2017 8.7* 6.4 - 8.2 g/dL Final    Albumin 03/10/2017 3.7  3.4 - 5.0 g/dL Final    Globulin 03/10/2017 5.0* 2.0 - 4.0 g/dL Final    A-G Ratio 03/10/2017 0.7* 0.8 - 1.7   Final    TSH 03/10/2017 2.47  0.36 - 3.74 uIU/mL Final    Cortisol, random 03/10/2017 9.7  3.09 - 22.40 ug/dL Final    Ferritin 03/10/2017 238  8 - 388 NG/ML Final       .No results found for any visits on 03/22/17. Assessment / Plan      ICD-10-CM ICD-9-CM    1. Rash of groin R21 782.1    2. Eye pain, right H57.11 379.91      Silvadene to rash  she was advised to continue her maintenance medications  To ophthalmology    Follow-up Disposition:  Return in about 3 months (around 6/22/2017). I asked Rani Hoyt [facility] if she has any questions and I answered the questions. Vicki Hoyt [facility] states that she understands the treatment plan and agrees with the treatment plan

## 2017-05-30 ENCOUNTER — OFFICE VISIT (OUTPATIENT)
Dept: INTERNAL MEDICINE CLINIC | Age: 82
End: 2017-05-30

## 2017-05-30 VITALS
BODY MASS INDEX: 23.99 KG/M2 | SYSTOLIC BLOOD PRESSURE: 142 MMHG | OXYGEN SATURATION: 97 % | TEMPERATURE: 98.6 F | RESPIRATION RATE: 16 BRPM | WEIGHT: 144 LBS | HEART RATE: 77 BPM | DIASTOLIC BLOOD PRESSURE: 58 MMHG | HEIGHT: 65 IN

## 2017-05-30 DIAGNOSIS — C90.01 MULTIPLE MYELOMA IN REMISSION (HCC): ICD-10-CM

## 2017-05-30 DIAGNOSIS — I10 ESSENTIAL HYPERTENSION, BENIGN: Primary | ICD-10-CM

## 2017-05-30 DIAGNOSIS — R09.89 RIGHT CAROTID BRUIT: ICD-10-CM

## 2017-05-30 NOTE — MR AVS SNAPSHOT
Visit Information Date & Time Provider Department Dept. Phone Encounter #  
 5/30/2017  2:00 PM Lydia Farrell MD [facility] INTERNAL MEDICINE OF [address] [id number] Follow-up Instructions Return in about 4 months (around 9/30/2017). Your Appointments 9/25/2017  2:00 PM  
Follow Up with Lydia Farrell MD  
[address]) Appt Note: 4 month [address], [address] [place] [address]  
  
   
 [address], [address] [place] [address] Upcoming Health Maintenance Date Due DTaP/Tdap/Td series (1 - Tdap) 7/25/1989 Pneumococcal 65+ High/Highest Risk (2 of 2 - PCV13) 9/19/2012 INFLUENZA AGE 9 TO ADULT 8/1/2017 MEDICARE YEARLY EXAM 3/11/2018 GLAUCOMA SCREENING Q2Y 3/22/2019 Allergies as of 5/30/2017  Review Complete On: 5/30/2017 By: Berry Mcginnis LPN Severity Noted Reaction Type Reactions Penicillin V Potassium    Diarrhea States not allergic Shellfish Derived  04/21/2016    Swelling Current Immunizations  Reviewed on 5/26/2017 Name Date Influenza High Dose Vaccine PF 12/12/2016 Influenza Vaccine (Quad) PF 9/25/2015  1:00 PM  
 Pneumococcal Polysaccharide (PPSV-23) 9/19/2011, 10/20/1993 Td 7/24/1989 Zoster Vaccine, Live 2/8/2008 Not reviewed this visit You Were Diagnosed With   
  
 Codes Comments Right carotid bruit    -  Primary ICD-10-CM: R09.89 ICD-9-CM: 456. 9 Vitals BP Pulse Temp Resp Height(growth percentile) Weight(growth percentile) 142/58 (BP 1 Location: Left arm, BP Patient Position: Sitting) 77 98.6 °F (37 °C) (Tympanic) 16 5' 5\" (1.651 m) 144 lb (65.3 kg) SpO2 BMI OB Status Smoking Status 97% 23.96 kg/m2 Hysterectomy Never Smoker Vitals History BMI and BSA Data Body Mass Index Body Surface Area  
 23.96 kg/m 2 1.73 m 2 Preferred Pharmacy Pharmacy Name Phone  [address] [phone] Your Updated Medication List  
  
   
This list is accurate as of: 5/30/17  2:50 PM.  Always use your most recent med list.  
  
  
  
  
 ALEVE 220 mg Cap Generic drug:  naproxen sodium Take  by mouth. clotrimazole-betamethasone topical cream  
Commonly known as:  LOTRISONE  
APPLY TO AFFECTED AREA TWICE PER DAY  
  
 cyanocobalamin 1,000 mcg tablet Take 1,000 mcg by mouth daily. diazePAM 5 mg tablet Commonly known as:  VALIUM Take 5 mg by mouth every six (6) hours as needed for Anxiety. fluconazole 100 mg tablet Commonly known as:  DIFLUCAN  
1 tablet twice per day  
  
 fluticasone 50 mcg/actuation nasal spray Commonly known as:  FLONASE  
1 spray each nostril daily  
  
 hydroCHLOROthiazide 12.5 mg capsule Commonly known as:  [place] Poll[place] TAKE ONE CAPSULE BY MOUTH ONCE DAILY IN THE MORNING  
  
 sertraline 50 mg tablet Commonly known as:  ZOLOFT  
TAKE ONE TABLET BY MOUTH ONCE DAILY  
  
 silver sulfADIAZINE 1 % topical cream  
Commonly known as:  SILVADENE Apply twice per day  
  
 tiotropium bromide 1.25 mcg/actuation inhaler Commonly known as:  Penny Spearing Take 2 Puffs by inhalation daily. TYLENOL 325 mg tablet Generic drug:  acetaminophen Take  by mouth every four (4) hours as needed for Pain. ZyrTEC 10 mg tablet Generic drug:  cetirizine Take  by mouth. Prescriptions Sent to Pharmacy Refills  
 tiotropium bromide (SPIRIVA RESPIMAT) 1.25 mcg/actuation inhaler 3 Sig: Take 2 Puffs by inhalation daily. Class: Normal  
 Pharmacy: [facility] [address] [place] Ph #: [phone] Route: Inhalation Follow-up Instructions Return in about 4 months (around 9/30/2017). To-Do List   
 06/01/2017   Imaging:  DUPLEX CAROTID BILATERAL   
  
 06/06/2017 11:00 AM  
 Appointment with [facility] 1 at [facility] ([phone]) No preparation is required for this study. Patient can have their meals and take their medications. Patient should not wear a turtleneck or high neck shirt for this study. Please report to the main location @ [facility] [address] approximately 30 minutes prior to your appointment time. The entrance is located on the RIGHT side of the street, immediately adjacent to the Emergency Room. Patient Instructions Health Maintenance Due Topic  DTaP/Tdap/Td series (1 - Tdap)  Pneumococcal 65+ High/Highest Risk (2 of 2 - PCV13) Please provide this summary of care documentation to your next provider. Your primary care clinician is listed as Tosha Perez. If you have any questions after today's visit, please call [phone].

## 2017-05-30 NOTE — PROGRESS NOTES
The patient presents to the office today with the chief complaint of hypertension    HPI    The patient remains on medications for HCTZ for hypertension. she is tolerating the medications well. The patient is receiving chemotherapy for multiple myeloma. The patient is tolerating the medication well. The illness is quieting down. The patient has no complaints. Review of Systems   HENT: Positive for hearing loss. Respiratory: Negative for shortness of breath. Cardiovascular: Negative for chest pain and leg swelling. Allergies   Allergen Reactions    Penicillin V Potassium Diarrhea     States not allergic    Shellfish Derived Swelling       Current Outpatient Prescriptions   Medication Sig Dispense Refill    tiotropium bromide (SPIRIVA RESPIMAT) 1.25 mcg/actuation inhaler Take 2 Puffs by inhalation daily. 3 Inhaler 3    acetaminophen (TYLENOL) 325 mg tablet Take  by mouth every four (4) hours as needed for Pain.  silver sulfADIAZINE (SILVADENE) 1 % topical cream Apply twice per day 50 g 1    fluconazole (DIFLUCAN) 100 mg tablet 1 tablet twice per day 5 Tab 0    naproxen sodium (ALEVE) 220 mg cap Take  by mouth.  hydroCHLOROthiazide (MICROZIDE) 12.5 mg capsule TAKE ONE CAPSULE BY MOUTH ONCE DAILY IN THE MORNING 90 Cap 0    clotrimazole-betamethasone (LOTRISONE) topical cream APPLY TO AFFECTED AREA TWICE PER DAY 45 g 2    sertraline (ZOLOFT) 50 mg tablet TAKE ONE TABLET BY MOUTH ONCE DAILY 30 Tab 0    cyanocobalamin 1,000 mcg tablet Take 1,000 mcg by mouth daily.  fluticasone (FLONASE) 50 mcg/actuation nasal spray 1 spray each nostril daily 1 Bottle 1    diazepam (VALIUM) 5 mg tablet Take 5 mg by mouth every six (6) hours as needed for Anxiety.  cetirizine (ZYRTEC) 10 mg tablet Take  by mouth.          Past Medical History:   Diagnosis Date    Acute bronchitis     Acute upper respiratory infections of unspecified site     Cough     Essential hypertension, benign  Meniere's disease, unspecified     Muscle pain     Pain in limb        Past Surgical History:   Procedure Laterality Date    HX HYSTERECTOMY      HX MOHS PROCEDURES Left        Social History     Social History    Marital status:      Spouse name: N/A    Number of children: N/A    Years of education: N/A     Occupational History    Not on file. Social History Main Topics    Smoking status: Never Smoker    Smokeless tobacco: Never Used    Alcohol use No    Drug use: No    Sexual activity: Not Currently     Other Topics Concern    Not on file     Social History Narrative       Patient does not have an advanced directive on file    Visit Vitals    /58 (BP 1 Location: Left arm, BP Patient Position: Sitting)    Pulse 77    Temp 98.6 °F (37 °C) (Tympanic)    Resp 16    Ht 5' 5\" (1.651 m)    Wt 144 lb (65.3 kg)    SpO2 97%    BMI 23.96 kg/m2       Physical Exam   Cardiovascular: Normal rate and regular rhythm. Exam reveals no gallop. No murmur heard. Pulmonary/Chest: She has no wheezes. She has no rales. Abdominal: She exhibits no distension. There is no tenderness. Musculoskeletal: She exhibits no edema.        BMI:  [facility] Outpatient Visit on 03/13/2017   Component Date Value Ref Range Status    Calcium 03/13/2017 13.2* 8.5 - 10.1 MG/DL Final    Comment: CALLED TO AND CORRECTLY REPEATED BY:   [id number] ON 3/13/17 AT 2122 TO WF      PTH, Intact 03/13/2017 10.7* 14.0 - 72.0 pg/mL Final    Protein, total 03/13/2017 8.3  6.0 - 8.5 g/dL Final    Albumin 03/13/2017 3.7  2.9 - 4.4 g/dL Final    Alpha-1-globulin 03/13/2017 0.3  0.0 - 0.4 g/dL Final    ALPHA-2 GLOBULIN 03/13/2017 0.8  0.4 - 1.0 g/dL Final    Beta globulin 03/13/2017 0.9  0.7 - 1.3 g/dL Final    Gamma globulin 03/13/2017 2.6* 0.4 - 1.8 g/dL Final    M-spike 03/13/2017 2.4* Not Observed g/dL Final    Globulin, total 03/13/2017 4.6* 2.2 - 3.9 g/dL Final    A/G ratio 03/13/2017 0.8  0.7 - 1.7 Final    Immunoglobulin G, Qt. 03/13/2017 3093* 700 - 1600 mg/dL Final    Immunoglobulin A, Qt. 03/13/2017 20* 64 - 422 mg/dL Final    Comment: (NOTE)  Result confirmed on concentration.  Immunoglobulin M, Qt. 03/13/2017 22* 26 - 217 mg/dL Final    Comment: (NOTE)  Result confirmed on concentration. Performed At: [address] [id number]  [address] [id number]     Hospital Outpatient Visit on 03/10/2017   Component Date Value Ref Range Status    WBC 03/10/2017 4.6  4.6 - 13.2 K/uL Final    RBC 03/10/2017 3.11* 4.20 - 5.30 M/uL Final    HGB 03/10/2017 8.3* 12.0 - 16.0 g/dL Final    HCT 03/10/2017 28.0* 35.0 - 45.0 % Final    MCV 03/10/2017 90.0  74.0 - 97.0 FL Final    MCH 03/10/2017 26.7  24.0 - 34.0 PG Final    MCHC 03/10/2017 29.6* 31.0 - 37.0 g/dL Final    RDW 03/10/2017 19.1* 11.6 - 14.5 % Final    PLATELET 56/98/4299 797  135 - 420 K/uL Final    MPV 03/10/2017 10.3  9.2 - 11.8 FL Final    NEUTROPHILS 03/10/2017 43  40 - 73 % Final    LYMPHOCYTES 03/10/2017 35  21 - 52 % Final    MONOCYTES 03/10/2017 18* 3 - 10 % Final    EOSINOPHILS 03/10/2017 4  0 - 5 % Final    BASOPHILS 03/10/2017 0  0 - 2 % Final    ABS. NEUTROPHILS 03/10/2017 1.9  1.8 - 8.0 K/UL Final    ABS. LYMPHOCYTES 03/10/2017 1.6  0.9 - 3.6 K/UL Final    ABS. MONOCYTES 03/10/2017 0.8  0.05 - 1.2 K/UL Final    ABS. EOSINOPHILS 03/10/2017 0.2  0.0 - 0.4 K/UL Final    ABS.  BASOPHILS 03/10/2017 0.0  0.0 - 0.06 K/UL Final    DF 03/10/2017 AUTOMATED    Final    Sodium 03/10/2017 136  136 - 145 mmol/L Final    Potassium 03/10/2017 3.8  3.5 - 5.5 mmol/L Final    Chloride 03/10/2017 102  100 - 108 mmol/L Final    CO2 03/10/2017 30  21 - 32 mmol/L Final    Anion gap 03/10/2017 4  3.0 - 18 mmol/L Final    Glucose 03/10/2017 99  74 - 99 mg/dL Final    BUN 03/10/2017 18  7.0 - 18 MG/DL Final    Creatinine 03/10/2017 0.87  0.6 - 1.3 MG/DL Final    BUN/Creatinine ratio 03/10/2017 21* 12 - 20   Final    GFR est AA 03/10/2017 >60  >60 ml/min/1.73m2 Final    GFR est non-AA 03/10/2017 >60  >60 ml/min/1.73m2 Final    Comment: (NOTE)  Estimated GFR is calculated using the Modification of Diet in Renal   Disease (MDRD) Study equation, reported for both  Americans   (GFRAA) and non- Americans (GFRNA), and normalized to 1.73m2   body surface area. The physician must decide which value applies to   the patient. The MDRD study equation should only be used in   individuals age 25 or older. It has not been validated for the   following: pregnant women, patients with serious comorbid conditions,   or on certain medications, or persons with extremes of body size,   muscle mass, or nutritional status.  Calcium 03/10/2017 11.8* 8.5 - 10.1 MG/DL Final    Bilirubin, total 03/10/2017 0.3  0.2 - 1.0 MG/DL Final    ALT (SGPT) 03/10/2017 32  13 - 56 U/L Final    AST (SGOT) 03/10/2017 27  15 - 37 U/L Final    Alk. phosphatase 03/10/2017 110  45 - 117 U/L Final    Protein, total 03/10/2017 8.7* 6.4 - 8.2 g/dL Final    Albumin 03/10/2017 3.7  3.4 - 5.0 g/dL Final    Globulin 03/10/2017 5.0* 2.0 - 4.0 g/dL Final    A-G Ratio 03/10/2017 0.7* 0.8 - 1.7   Final    TSH 03/10/2017 2.47  0.36 - 3.74 uIU/mL Final    Cortisol, random 03/10/2017 9.7  3.09 - 22.40 ug/dL Final    Ferritin 03/10/2017 238  8 - 388 NG/ML Final       .No results found for any visits on 05/30/17. Assessment / Plan      ICD-10-CM ICD-9-CM    1. Essential hypertension, benign I10 401.1    2. Right carotid bruit R09.89 785.9 DUPLEX CAROTID BILATERAL   3. Multiple myeloma in remission [facility]) C90.01 203.01      Duplex Carotids  she was advised to continue her maintenance medications    Follow-up Disposition:  Return in about 4 months (around 9/30/2017). I asked Saray Garcia if she has any questions and I answered the questions. Magdy Guzman Job states that she understands the treatment plan and agrees with the treatment plan

## 2017-05-30 NOTE — PROGRESS NOTES
1. Have you been to the ER, urgent care clinic since your last visit? Hospitalized since your last visit? No    2. Have you seen or consulted any other health care providers outside of the [address] [place] [address] since your last visit? Include any pap smears or colon screening.  No

## 2017-05-31 RX ORDER — HYDROCHLOROTHIAZIDE 12.5 MG/1
CAPSULE ORAL
Qty: 90 CAP | Refills: 0 | Status: SHIPPED | OUTPATIENT
Start: 2017-05-31 | End: 2017-08-22 | Stop reason: SDUPTHER

## 2017-06-06 ENCOUNTER — HOSPITAL ENCOUNTER (OUTPATIENT)
Dept: VASCULAR SURGERY | Age: 82
Discharge: HOME | End: 2017-06-06
Attending: INTERNAL MEDICINE
Payer: MEDICARE

## 2017-06-06 DIAGNOSIS — R09.89 RIGHT CAROTID BRUIT: ICD-10-CM

## 2017-06-06 PROCEDURE — 93880 EXTRACRANIAL BILAT STUDY: CPT

## 2017-06-06 NOTE — PROCEDURES
[facility]  *** FINAL REPORT ***    Name: Carroll Olivia  MRN: [id number]    Outpatient  : 1931  HIS Order #: [id number]  [address] [facility] Visit #: [id number]  Date: 2017    TYPE OF TEST: Cerebrovascular Duplex    REASON FOR TEST    Right Carotid:-             Proximal               Mid                 Distal  cm/s  Systolic  Diastolic  Systolic  Diastolic  Systolic  Diastolic  CCA:    484.2      35.0      135.0      31.0      135.0      27.0  Bulb:  ECA:    163.0      23.0  ICA:    139.0      19.0      272.0      54.0      117.0      20.0  ICA/CCA:  2.0       1.5    ICA Stenosis: 50-69%    Right Vertebral:-  Finding:  Sys:  Liza:    Right Subclavian:    Left Carotid:-            Proximal                Mid                 Distal  cm/s  Systolic  Diastolic  Systolic  Diastolic  Systolic  Diastolic  CCA:     41.1      16.0      108.0      18.0      122.0      24.0  Bulb:  ECA:    315.0  ICA:    232.0      15.0      190.0      38.0      145.0      26.0  ICA/CCA:  1.9       0.6    ICA Stenosis: 50-69%    Left Vertebral:-  Finding: Antegrade  Sys:      104.0  Liza:       27.0    Left Subclavian: Normal    INTERPRETATION/FINDINGS  1. Bilateral moderate 50-69% stenosis of the internal carotid  arteries. 2. No significant stenosis in the external carotid arteries  bilaterally. 3. Antegrade flow in both vertebral arteries. 4. Normal flow in both subclavian arteries. Plaque Morphology:  1. Hypoechoic plaque in the bulb and right ICA. 2. Hyperechoic plaque in the bulb and left ICA. ADDITIONAL COMMENTS  The 50-69 % of stenosis in the mid right ICA  is considered to be  tortuousity versus stenosis. No significant change from previous exam performed on 10/08/2013. I have personally reviewed the data relevant to the interpretation of  this  study. TECHNOLOGIST: Willy Hill, [facility], RVT/  Signed: 2017 12:40 PM    PHYSICIAN: Jules Soliman MD  Signed: 2017 02:13 PM

## 2017-07-11 ENCOUNTER — HOSPITAL ENCOUNTER (OUTPATIENT)
Dept: LAB | Age: 82
Discharge: HOME | End: 2017-07-11
Payer: MEDICARE

## 2017-07-11 ENCOUNTER — OFFICE VISIT (OUTPATIENT)
Dept: INTERNAL MEDICINE CLINIC | Age: 82
End: 2017-07-11

## 2017-07-11 VITALS
DIASTOLIC BLOOD PRESSURE: 44 MMHG | WEIGHT: 143 LBS | SYSTOLIC BLOOD PRESSURE: 128 MMHG | TEMPERATURE: 98.5 F | RESPIRATION RATE: 16 BRPM | HEIGHT: 65 IN | OXYGEN SATURATION: 97 % | BODY MASS INDEX: 23.82 KG/M2 | HEART RATE: 75 BPM

## 2017-07-11 DIAGNOSIS — A52.71 OCULAR SYPHILIS: Primary | ICD-10-CM

## 2017-07-11 DIAGNOSIS — A52.71 OCULAR SYPHILIS: ICD-10-CM

## 2017-07-11 DIAGNOSIS — I10 ESSENTIAL HYPERTENSION, BENIGN: ICD-10-CM

## 2017-07-11 LAB
RPR SER QL: REACTIVE
RPR SER-TITR: NORMAL {TITER}

## 2017-07-11 PROCEDURE — 86593 SYPHILIS TEST NON-TREP QUANT: CPT | Performed by: INTERNAL MEDICINE

## 2017-07-11 PROCEDURE — 86780 TREPONEMA PALLIDUM: CPT | Performed by: INTERNAL MEDICINE

## 2017-07-11 PROCEDURE — 86592 SYPHILIS TEST NON-TREP QUAL: CPT | Performed by: INTERNAL MEDICINE

## 2017-07-11 RX ORDER — DEXAMETHASONE 4 MG/1
4 TABLET ORAL
COMMUNITY
End: 2017-12-21 | Stop reason: ALTCHOICE

## 2017-07-11 RX ORDER — ACYCLOVIR 400 MG/1
400 TABLET ORAL 2 TIMES DAILY
COMMUNITY
End: 2019-03-18

## 2017-07-11 NOTE — PROGRESS NOTES
1. Have you been to the ER, urgent care clinic since your last visit? Hospitalized since your last visit? No    2. Have you seen or consulted any other health care providers outside of the [address] [place] [address] since your last visit? Include any pap smears or colon screening.  Dr. Ann Perez

## 2017-07-12 LAB — T PALLIDUM AB SER QL IF: REACTIVE

## 2017-07-12 NOTE — PROGRESS NOTES
The patient presents to the office today with the chief complaint of ocular syphillis    HPI    The patient persists with poor hearing. She is due to get a new hearing device. The patient has mild problems with vision in her left eye. 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 The patient does not know of any exposure. The patient remains on medications for hypertension  She is tolerating the medications well. Review of Systems   Eyes:        Mild decrease in vision in her left eye   Respiratory: Negative for shortness of breath. Cardiovascular: Negative for chest pain and leg swelling. Allergies   Allergen Reactions    Penicillin V Potassium Diarrhea     States not allergic    Shellfish Derived Swelling       Current Outpatient Prescriptions   Medication Sig Dispense Refill    dexamethasone (DECADRON) 4 mg tablet Take 4 mg by mouth every seven (7) days. 10 tablets weekly      BORTEZOMIB (VELCADE INJECTION) by Injection route.  LUTEIN PO Take  by mouth.  acyclovir (ZOVIRAX) 400 mg tablet Take 400 mg by mouth every four (4) hours (while awake).  hydroCHLOROthiazide (MICROZIDE) 12.5 mg capsule TAKE ONE CAPSULE BY MOUTH ONCE DAILY IN THE MORNING 90 Cap 0    tiotropium bromide (SPIRIVA RESPIMAT) 1.25 mcg/actuation inhaler Take 2 Puffs by inhalation daily. 3 Inhaler 3    acetaminophen (TYLENOL) 325 mg tablet Take  by mouth every four (4) hours as needed for Pain.  silver sulfADIAZINE (SILVADENE) 1 % topical cream Apply twice per day 50 g 1    naproxen sodium (ALEVE) 220 mg cap Take  by mouth.  clotrimazole-betamethasone (LOTRISONE) topical cream APPLY TO AFFECTED AREA TWICE PER DAY 45 g 2    sertraline (ZOLOFT) 50 mg tablet TAKE ONE TABLET BY MOUTH ONCE DAILY 30 Tab 0    cyanocobalamin 1,000 mcg tablet Take 1,000 mcg by mouth daily.       fluticasone (FLONASE) 50 mcg/actuation nasal spray 1 spray each nostril daily 1 Bottle 1    diazepam (VALIUM) 5 mg tablet Take 5 mg by mouth every six (6) hours as needed for Anxiety.  cetirizine (ZYRTEC) 10 mg tablet Take  by mouth. Past Medical History:   Diagnosis Date    Acute bronchitis     Acute upper respiratory infections of unspecified site     Cough     Essential hypertension, benign     Meniere's disease, unspecified     Muscle pain     Pain in limb        Past Surgical History:   Procedure Laterality Date    HX HYSTERECTOMY      HX MOHS PROCEDURES Left        Social History     Social History    Marital status:      Spouse name: N/A    Number of children: N/A    Years of education: N/A     Occupational History    Not on file. Social History Main Topics    Smoking status: Never Smoker    Smokeless tobacco: Never Used    Alcohol use No    Drug use: No    Sexual activity: Not Currently     Other Topics Concern    Not on file     Social History Narrative       Patient does have an advanced directive on file    Visit Vitals    /44 (BP 1 Location: Left arm, BP Patient Position: Sitting)    Pulse 75    Temp 98.5 °F (36.9 °C) (Tympanic)    Resp 16    Ht 5' 5\" (1.651 m)    Wt 143 lb (64.9 kg)    SpO2 97%    BMI 23.8 kg/m2       Physical Exam   Eyes:   Unable to see either retina well due to densities in both lens -- ? cataracts   Cardiovascular: Normal rate and regular rhythm. Exam reveals no gallop. No murmur heard. Pulmonary/Chest: She has no wheezes. She has no rales. Musculoskeletal: She exhibits no edema.        BMI:  [facility] Outpatient Visit on 07/11/2017   Component Date Value Ref Range Status    RPR 07/11/2017 REACTIVE* NR   Final    RPR titer 07/11/2017 1:2   Final       .  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 Essential hypertension, benign I10 401.1      Labs  Further plans will be based on lab results  she was advised to continue her maintenance medications      Follow-up Disposition:  Return in about 6 days (around 7/17/2017). I asked Nicole Eller. Yudy Brian if she has any questions and I answered the questions. Daryl Brian states that she understands the treatment plan and agrees with the treatment plan

## 2017-07-13 ENCOUNTER — TELEPHONE (OUTPATIENT)
Dept: INTERNAL MEDICINE CLINIC | Age: 82
End: 2017-07-13

## 2017-07-13 DIAGNOSIS — A52.9: Primary | ICD-10-CM

## 2017-08-07 ENCOUNTER — LAB ONLY (OUTPATIENT)
Dept: INTERNAL MEDICINE CLINIC | Age: 82
End: 2017-08-07

## 2017-08-07 ENCOUNTER — HOSPITAL ENCOUNTER (OUTPATIENT)
Dept: LAB | Age: 82
Discharge: HOME | End: 2017-08-07
Payer: MEDICARE

## 2017-08-07 DIAGNOSIS — A53.0 SYPHILIS, LATENT: ICD-10-CM

## 2017-08-07 DIAGNOSIS — A52.71 OCULAR SYPHILIS: ICD-10-CM

## 2017-08-07 DIAGNOSIS — A52.9: Primary | ICD-10-CM

## 2017-08-07 DIAGNOSIS — A53.0 SYPHILIS, LATENT: Primary | ICD-10-CM

## 2017-08-07 LAB — RPR SER-TITR: NORMAL {TITER}

## 2017-08-07 PROCEDURE — 86593 SYPHILIS TEST NON-TREP QUANT: CPT | Performed by: INTERNAL MEDICINE

## 2017-08-07 PROCEDURE — 86780 TREPONEMA PALLIDUM: CPT | Performed by: INTERNAL MEDICINE

## 2017-08-07 PROCEDURE — 86592 SYPHILIS TEST NON-TREP QUAL: CPT | Performed by: INTERNAL MEDICINE

## 2017-08-09 LAB
RPR SER QL: REACTIVE
T PALLIDUM AB SER QL IF: REACTIVE

## 2017-08-22 RX ORDER — HYDROCHLOROTHIAZIDE 12.5 MG/1
CAPSULE ORAL
Qty: 90 CAP | Refills: 0 | Status: SHIPPED | OUTPATIENT
Start: 2017-08-22 | End: 2017-11-22 | Stop reason: SDUPTHER

## 2017-09-25 ENCOUNTER — OFFICE VISIT (OUTPATIENT)
Dept: INTERNAL MEDICINE CLINIC | Age: 82
End: 2017-09-25

## 2017-09-25 VITALS
HEART RATE: 88 BPM | DIASTOLIC BLOOD PRESSURE: 60 MMHG | WEIGHT: 144 LBS | TEMPERATURE: 98.4 F | BODY MASS INDEX: 23.99 KG/M2 | HEIGHT: 65 IN | SYSTOLIC BLOOD PRESSURE: 142 MMHG | OXYGEN SATURATION: 97 %

## 2017-09-25 DIAGNOSIS — C90.01 MULTIPLE MYELOMA IN REMISSION (HCC): ICD-10-CM

## 2017-09-25 DIAGNOSIS — A51.49: Primary | ICD-10-CM

## 2017-09-25 PROBLEM — A52.9: Status: RESOLVED | Noted: 2017-07-13 | Resolved: 2017-09-25

## 2017-09-25 RX ORDER — ASPIRIN 81 MG/1
TABLET ORAL DAILY
COMMUNITY
End: 2019-02-22 | Stop reason: SDUPTHER

## 2017-09-25 NOTE — PROGRESS NOTES
1. Have you been to the ER, urgent care clinic since your last visit? Hospitalized since your last visit? No    2. Have you seen or consulted any other health care providers outside of the [address] [place] [address] since your last visit? Include any pap smears or colon screening.  No

## 2017-09-25 NOTE — PATIENT INSTRUCTIONS
Health Maintenance Due   Topic Date Due    DTaP/Tdap/Td  (1 - Tdap) 07/25/1989    Pneumococcal Vaccine (2 of 2 - PCV13) 09/19/2012    Flu Vaccine  08/01/2017

## 2017-09-26 NOTE — PROGRESS NOTES
The patient is status post three doses of Bicillin. The recently had a repeat RPR which apparently showed a rise in the titer. A repeat titer is due in 2 weeks. The patient has multiple myeloma. This is in remission but the chemo has been held during the this period of time. The patient has no complains. I discussed the situation with the patient. I will discuss the lab results and plans with ID and oncology to develop further plans for the syphilitic eye infection and multiple myeloma  A total of 10 minutes was spent with the patient. Greater than 50% of this time was spent in discussion of the problem, counseling the patient, and coordinating the care regarding the problem of syphilis and multiple myeloma.

## 2017-10-10 ENCOUNTER — TELEPHONE (OUTPATIENT)
Dept: INTERNAL MEDICINE CLINIC | Age: 82
End: 2017-10-10

## 2017-10-10 NOTE — TELEPHONE ENCOUNTER
Kait Berger from Dr. Stella Anderson office called to let Dr. Bee Ham know patient has been released to go back to receiving chemo and the oncologist has also been notified. Patient asked Kait Berger to notify Dr. Bee Ham. Dr. Chilo Dow will be faxing labs and notes.

## 2017-10-20 NOTE — PROGRESS NOTES
[facility] Tyshawn Michel  [address] [facility]  [phone] fax    Speech Therapy Discharge Summary    Patient name: Misti Deras Start of Care: 2016   Referral source: Vito Schreiber MD : [id number]    Medical Diagnosis: Dysphagia, unspecified [R13.10] Onset Date:     Treatment Diagnosis: Pharyngeal dysphagia   Prior Hospitalization: see medical history Provider#: [id number]   Medications: Verified on Patient summary List   Comorbidities: HTN, Acute bronchitis, Cough, Muscle pain, Acute upper respiratory infections of unspecified site, Meniere's disease, unspecified, hysterectomy, rotator cuff repair      Prior Level of Function: consuming a regular diet with thin liquids without difficulty     Visits from Start of Care: 4                                                                              Missed Visits: 0     Reporting Period: 16 to 17      Summary of Care: dysphagia treatments    She has an BA esophagram on 16 with a single episode of silent tracheal aspiration of a trace amount of barium contrast. There was gastroesophageal reflux in the thoracic outlet. Patient is following GERD diet and lifestyle recommendations and the swallowing guidelines developed during the bedside swallowing evaluation. She has not been doing any swallowing exercises yet-still recommending MBS-recommended on 16. Referring physician has been out of the office-returning . Several requests made to staff to direct the request to the referring MD when she returns. She had an MBS on 17      ASSESSMENT:MBS completed with results yielding min pharyngeal dysphagia c/b scant trace penetration with thin liquids +/- straw. Chin tuck and small sips ineffective at airway closure; however, scant trace penetration with thin liquids does not appear to pt at high risk for aspiration at this time.  Pt tolerated reg solid, puree, nectar-thick liquid, thin liquid, and 13 mm Ba pill with thin with puree without aspiration events. Positive oropharyngeal clearance appreciated across all trials. Bolus manipulation, tongue base retraction, laryngeal elevation, and overall pharyngeal motility/sensation appeared [facility] throughout study. In a-p view, bolus clearance was symmetrical. Vocal fold adduction complete upon phonation. Rec reg diet with thin liquids, aspiration precautions, oral care TID, and meds as tolerated. Pt may benefit from f/u with outpatient speech pathology to ensure safety of PO and education of safe swallowing techniques/strategies. Results and recommendations reviewed with pt in detail utilizing visual feedback. Note: patient was independent with safe swallowing guidelines at her last visit with all consistencies. See last progress report dated 1/11/17 for details. Patient did not return to treatment after the MBS.      G Codes:  Swallowing:  Swallow Current Status CI= 1-19%   Swallow Goal Status CI= 1-19%   Swallow D/C Status CI= 1-19%      The severity rating is based on the following outcomes:    National Outcomes Measures (NOMS), results of MBS and     RECOMMENDATIONS:  [x]Discontinue therapy: [x]Patient has reached or is progressing toward set goals      []Patient is non-compliant or has abdicated      []Due to lack of appreciable progress towards set [address] 10/20/2017 11:06 AM

## 2017-11-22 RX ORDER — HYDROCHLOROTHIAZIDE 12.5 MG/1
CAPSULE ORAL
Qty: 90 CAP | Refills: 0 | Status: SHIPPED | OUTPATIENT
Start: 2017-11-22 | End: 2018-02-22 | Stop reason: SDUPTHER

## 2017-12-05 ENCOUNTER — OFFICE VISIT (OUTPATIENT)
Dept: INTERNAL MEDICINE CLINIC | Age: 82
End: 2017-12-05

## 2017-12-05 VITALS
HEIGHT: 65 IN | OXYGEN SATURATION: 94 % | BODY MASS INDEX: 24.32 KG/M2 | SYSTOLIC BLOOD PRESSURE: 150 MMHG | DIASTOLIC BLOOD PRESSURE: 68 MMHG | HEART RATE: 77 BPM | RESPIRATION RATE: 16 BRPM | TEMPERATURE: 97.4 F | WEIGHT: 146 LBS

## 2017-12-05 DIAGNOSIS — J40 BRONCHITIS: ICD-10-CM

## 2017-12-05 DIAGNOSIS — J84.9 ILD (INTERSTITIAL LUNG DISEASE) (HCC): Primary | ICD-10-CM

## 2017-12-05 DIAGNOSIS — I10 ESSENTIAL HYPERTENSION, BENIGN: ICD-10-CM

## 2017-12-05 DIAGNOSIS — A53.0 SYPHILIS, LATENT: ICD-10-CM

## 2017-12-05 RX ORDER — AZITHROMYCIN 250 MG/1
TABLET, FILM COATED ORAL
Qty: 6 TAB | Refills: 0 | Status: SHIPPED | OUTPATIENT
Start: 2017-12-05 | End: 2017-12-10

## 2017-12-05 RX ORDER — BENZONATATE 200 MG/1
200 CAPSULE ORAL
Qty: 20 CAP | Refills: 0 | Status: SHIPPED | OUTPATIENT
Start: 2017-12-05 | End: 2017-12-12

## 2017-12-05 RX ORDER — PREDNISONE 20 MG/1
TABLET ORAL
Qty: 20 TAB | Refills: 0 | Status: SHIPPED | OUTPATIENT
Start: 2017-12-05 | End: 2017-12-20 | Stop reason: ALTCHOICE

## 2017-12-05 NOTE — PROGRESS NOTES
1. Have you been to the ER, urgent care clinic since your last visit? Hospitalized since your last visit? No    2. Have you seen or consulted any other health care providers outside of the [address] [place] [address] since your last visit? Include any pap smears or colon screening.  No

## 2017-12-06 NOTE — PROGRESS NOTES
The patient presents to the office today with the chief complaint of chest congestion    HPI    The patient complains of chest congestion with cough. The cough is non productive. The patient denies fever. The patient has mild  Dyspnea. The patient has underlying pulmonary interstitial disease which has not been symptommatic. 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 The patient remains on medications for hypertension. The medications have been well tolerated      Review of Systems   Respiratory: Positive for cough. Negative for shortness of breath. Cardiovascular: Positive for chest pain. Negative for leg swelling. Allergies   Allergen Reactions    Penicillin V Potassium Diarrhea     States not allergic    Shellfish Derived Swelling       Current Outpatient Prescriptions   Medication Sig Dispense Refill    GUAIFENESIN/DEXTROMETHORPHAN (TUSSIN DM COUGH PO) Take  by mouth.  azithromycin (ZITHROMAX) 250 mg tablet Take 2 tablets today, then take 1 tablet daily 6 Tab 0    predniSONE (DELTASONE) 20 mg tablet 3 tabs daily x 3 days, 2 tabs daily x 3 days, 1 tab daily x 3 days, 1/2 tab daily x 3 days 20 Tab 0    benzonatate (TESSALON) 200 mg capsule Take 1 Cap by mouth three (3) times daily as needed for Cough for up to 7 days. 20 Cap 0    hydroCHLOROthiazide (MICROZIDE) 12.5 mg capsule TAKE ONE CAPSULE BY MOUTH ONCE DAILY IN THE MORNING 90 Cap 0    aspirin delayed-release 81 mg tablet Take  by mouth daily.  dexamethasone (DECADRON) 4 mg tablet Take 4 mg by mouth every seven (7) days. 10 tablets weekly      BORTEZOMIB (VELCADE INJECTION) by Injection route.  LUTEIN PO Take  by mouth.  acyclovir (ZOVIRAX) 400 mg tablet Take 400 mg by mouth every four (4) hours (while awake).  tiotropium bromide (SPIRIVA RESPIMAT) 1.25 mcg/actuation inhaler Take 2 Puffs by inhalation daily.  3 Inhaler 3    acetaminophen (TYLENOL) 325 mg tablet Take  by mouth every four (4) hours as needed for Pain.  silver sulfADIAZINE (SILVADENE) 1 % topical cream Apply twice per day 50 g 1    naproxen sodium (ALEVE) 220 mg cap Take  by mouth.  clotrimazole-betamethasone (LOTRISONE) topical cream APPLY TO AFFECTED AREA TWICE PER DAY 45 g 2    sertraline (ZOLOFT) 50 mg tablet TAKE ONE TABLET BY MOUTH ONCE DAILY 30 Tab 0    cyanocobalamin 1,000 mcg tablet Take 1,000 mcg by mouth daily.  fluticasone (FLONASE) 50 mcg/actuation nasal spray 1 spray each nostril daily 1 Bottle 1    diazepam (VALIUM) 5 mg tablet Take 5 mg by mouth every six (6) hours as needed for Anxiety.  cetirizine (ZYRTEC) 10 mg tablet Take  by mouth. Past Medical History:   Diagnosis Date    Acute bronchitis     Acute upper respiratory infections of unspecified site     Cough     Essential hypertension, benign     Meniere's disease, unspecified     Muscle pain     Pain in limb        Past Surgical History:   Procedure Laterality Date    HX HYSTERECTOMY      HX MOHS PROCEDURES Left        Social History     Social History    Marital status:      Spouse name: N/A    Number of children: N/A    Years of education: N/A     Occupational History    Not on file. Social History Main Topics    Smoking status: Never Smoker    Smokeless tobacco: Never Used    Alcohol use No    Drug use: No    Sexual activity: Not Currently     Other Topics Concern    Not on file     Social History Narrative       Patient does have an advanced directive on file    Visit Vitals    /68 (BP 1 Location: Left arm, BP Patient Position: Sitting)    Pulse 77    Temp 97.4 °F (36.3 °C) (Tympanic)    Resp 16    Ht 5' 5\" (1.651 m)    Wt 146 lb (66.2 kg)    SpO2 94%    BMI 24.3 kg/m2       Physical Exam   Neck: Carotid bruit is not present. Cardiovascular: Normal rate and regular rhythm. Exam reveals no gallop.     No murmur heard.  Pulmonary/Chest: She has no wheezes. She has rales (Medium dry rales through out all fields). BMI:  OK    No visits with results within 3 Month(s) from this visit. Latest known visit with results is:    Hospital Outpatient Visit on 08/07/2017   Component Date Value Ref Range Status    RPR 08/07/2017 REACTIVE* NR   Final    T. pallidum Ab (FTA-Ab) 08/07/2017 Reactive* Non Reactive   Final    Comment: (NOTE)  Performed At: [address] [id number]  Paula Sharp MD [id number]      RPR titer 08/07/2017 1:4   Final       .No results found for any visits on 12/05/17. Assessment / Plan      ICD-10-CM ICD-9-CM    1. ILD (interstitial lung disease) ([facility] [address] J84.9 515    2. Essential hypertension, benign I10 401.1    3. Bronchitis J40 490    4. Syphilis, latent A53.0 097.1        Z Negro  Prednisone  Tessalon Perles  she was advised to continue her maintenance medications  she is to call if symptoms persist over five days      Follow-up Disposition:  Return in about 3 months (around 3/5/2018). I asked Edmar Corrales if she has any questions and I answered the questions. Sin Corrales states that she understands the treatment plan and agrees with the treatment plan

## 2017-12-19 ENCOUNTER — HOSPITAL ENCOUNTER (OUTPATIENT)
Dept: GENERAL RADIOLOGY | Age: 82
Discharge: HOME | End: 2017-12-19
Payer: MEDICARE

## 2017-12-19 DIAGNOSIS — C90.00 MULTIPLE MYELOMA (HCC): ICD-10-CM

## 2017-12-19 PROCEDURE — 71020 XR CHEST PA LAT: CPT

## 2017-12-20 ENCOUNTER — OFFICE VISIT (OUTPATIENT)
Dept: INTERNAL MEDICINE CLINIC | Age: 82
End: 2017-12-20

## 2017-12-20 ENCOUNTER — TELEPHONE (OUTPATIENT)
Dept: INTERNAL MEDICINE CLINIC | Age: 82
End: 2017-12-20

## 2017-12-20 VITALS
BODY MASS INDEX: 23.82 KG/M2 | TEMPERATURE: 98.2 F | RESPIRATION RATE: 20 BRPM | HEART RATE: 80 BPM | HEIGHT: 65 IN | WEIGHT: 143 LBS | SYSTOLIC BLOOD PRESSURE: 144 MMHG | OXYGEN SATURATION: 99 % | DIASTOLIC BLOOD PRESSURE: 64 MMHG

## 2017-12-20 DIAGNOSIS — J40 BRONCHITIS: ICD-10-CM

## 2017-12-20 DIAGNOSIS — C90.01 MULTIPLE MYELOMA IN REMISSION (HCC): ICD-10-CM

## 2017-12-20 DIAGNOSIS — J84.9 ILD (INTERSTITIAL LUNG DISEASE) (HCC): Primary | ICD-10-CM

## 2017-12-20 RX ORDER — CEFUROXIME AXETIL 500 MG/1
TABLET ORAL
Qty: 20 TAB | Refills: 0 | Status: SHIPPED | OUTPATIENT
Start: 2017-12-20 | End: 2017-12-21 | Stop reason: SDUPTHER

## 2017-12-20 NOTE — PROGRESS NOTES
1. Have you been to the ER, urgent care clinic since your last visit? Hospitalized since your last visit? No    2. Have you seen or consulted any other health care providers outside of the [address] [place] [address] since your last visit? Include any pap smears or colon screening.  No

## 2017-12-20 NOTE — TELEPHONE ENCOUNTER
Patient son called and would like to know the results of chest xray he thinks she has pneumonia please call [phone]

## 2017-12-20 NOTE — PATIENT INSTRUCTIONS
Health Maintenance Due   Topic Date Due    DTaP/Tdap/Td series (1 - Tdap) 07/25/1989    Pneumococcal 65+ High/Highest Risk (2 of 2 - PCV13) 09/19/2012

## 2017-12-21 ENCOUNTER — TELEPHONE (OUTPATIENT)
Dept: INTERNAL MEDICINE CLINIC | Age: 82
End: 2017-12-21

## 2017-12-21 PROBLEM — C90.01 MULTIPLE MYELOMA IN REMISSION (HCC): Status: ACTIVE | Noted: 2017-12-21

## 2017-12-21 RX ORDER — CEFUROXIME AXETIL 500 MG/1
TABLET ORAL
Qty: 20 TAB | Refills: 0 | Status: SHIPPED | OUTPATIENT
Start: 2017-12-21 | End: 2018-01-02 | Stop reason: ALTCHOICE

## 2017-12-21 NOTE — TELEPHONE ENCOUNTER
New prescription - issues with receipt at pharmacy      At request of Dr. Ignacio Garcia due to some recent issues with e-prescriptions not reaching pharmacies, contacted [facility] to confirm receipt of new cefuroxime electronic prescription. Pharmacy did not receive yesterday when it was originally prescribed.   Pharmacy did confirm receipt this morning. Patient contacted Dr. Ignacio Garcia this afternoon because she reported just calling [facility] and being told they did not have the prescription. At MD request, contacted pharmacy again and spoke with pharmacy technician who again confirmed that they have received the Ceftin prescription and that it was filled about 3 hours ago and is ready to be picked up by the patient. Contacted patient and advised her that it was ready to . Also advised patient that if there were any issues once she arrived at the pharmacy to please have the pharmacy call the office.     Current Outpatient Prescriptions:     cefUROXime (CEFTIN) 500 mg tablet, 1 tablet twice per day, Disp: 14 Tab, Rfl: 0       Kayla Portillo, RefugioD, BCACP

## 2018-01-02 ENCOUNTER — OFFICE VISIT (OUTPATIENT)
Dept: INTERNAL MEDICINE CLINIC | Age: 83
End: 2018-01-02

## 2018-01-02 VITALS
RESPIRATION RATE: 16 BRPM | BODY MASS INDEX: 23.82 KG/M2 | DIASTOLIC BLOOD PRESSURE: 64 MMHG | SYSTOLIC BLOOD PRESSURE: 134 MMHG | HEIGHT: 65 IN | HEART RATE: 70 BPM | TEMPERATURE: 97.9 F | WEIGHT: 143 LBS | OXYGEN SATURATION: 98 %

## 2018-01-02 DIAGNOSIS — J40 BRONCHITIS: Primary | ICD-10-CM

## 2018-01-02 DIAGNOSIS — C90.01 MULTIPLE MYELOMA IN REMISSION (HCC): ICD-10-CM

## 2018-01-02 DIAGNOSIS — J84.9 ILD (INTERSTITIAL LUNG DISEASE) (HCC): ICD-10-CM

## 2018-01-02 RX ORDER — PREDNISONE 20 MG/1
TABLET ORAL
Qty: 20 TAB | Refills: 0 | Status: SHIPPED | OUTPATIENT
Start: 2018-01-02 | End: 2018-02-05 | Stop reason: ALTCHOICE

## 2018-01-02 RX ORDER — CLARITHROMYCIN 500 MG/1
TABLET, FILM COATED ORAL
Qty: 20 TAB | Refills: 0 | Status: SHIPPED | OUTPATIENT
Start: 2018-01-02 | End: 2018-02-05 | Stop reason: HOSPADM

## 2018-01-02 NOTE — MR AVS SNAPSHOT
Visit Information Date & Time Provider Department Dept. Phone Encounter #  
 1/2/2018 11:15 AM Loyd Euceda MD [facility] [place] [phone] [id number] Your Appointments 2/5/2018  2:00 PM  
Follow Up with Loyd Euceda MD  
[facility]) Appt Note: 1 mo f/u; 1 mo f/u  
 [address], [address]  
  
   
 [address], [address] [place] [address] Upcoming Health Maintenance Date Due DTaP/Tdap/Td series (1 - Tdap) 7/25/1989 Pneumococcal 65+ High/Highest Risk (2 of 2 - PCV13) 9/19/2012 MEDICARE YEARLY EXAM 3/11/2018 BREAST CANCER SCRN MAMMOGRAM 8/24/2018 GLAUCOMA SCREENING Q2Y 3/22/2019 Allergies as of 1/2/2018  Review Complete On: 1/2/2018 By: Loyd Euceda MD  
  
 Severity Noted Reaction Type Reactions Penicillin V Potassium    Diarrhea States not allergic Shellfish Derived  04/21/2016    Swelling Current Immunizations  Reviewed on 9/22/2017 Name Date Influenza High Dose Vaccine PF 12/12/2016 Influenza Vaccine 11/24/2017 Influenza Vaccine (Quad) PF 9/25/2015  1:00 PM  
 Pneumococcal Polysaccharide (PPSV-23) 9/19/2011, 10/20/1993 Td 7/24/1989 Zoster Vaccine, Live 2/8/2008 Not reviewed this visit Vitals BP Pulse Temp Resp Height(growth percentile) 134/64 (BP 1 Location: Left arm, BP Patient Position: Sitting) 70 97.9 °F (36.6 °C) (Tympanic) 16 5' 5\" (1.651 m) Weight(growth percentile) SpO2 BMI OB Status Smoking Status 143 lb (64.9 kg) 98% 23.8 kg/m2 Hysterectomy Never Smoker BMI and BSA Data Body Mass Index Body Surface Area  
 23.8 kg/m 2 1.73 m 2 Preferred Pharmacy Pharmacy Name Phone [facility] [facility] [phone] Your Updated Medication List  
  
   
 This list is accurate as of: 18  1:23 PM.  Always use your most recent med list.  
  
  
  
  
 acyclovir 400 mg tablet Commonly known as:  ZOVIRAX Take 400 mg by mouth every four (4) hours (while awake). ALEVE 220 mg Cap Generic drug:  naproxen sodium Take  by mouth. aspirin delayed-release 81 mg tablet Take  by mouth daily. clarithromycin 500 mg tablet Commonly known as:  BIAXIN  
1 tablet twice per day with food  
  
 clotrimazole-betamethasone topical cream  
Commonly known as:  LOTRISONE  
APPLY TO AFFECTED AREA TWICE PER DAY  
  
 cyanocobalamin 1,000 mcg tablet Take 1,000 mcg by mouth daily. diazePAM 5 mg tablet Commonly known as:  VALIUM Take 5 mg by mouth every six (6) hours as needed for Anxiety. hydroCHLOROthiazide 12.5 mg capsule Commonly known as:  Genevive Hilario TAKE ONE CAPSULE BY MOUTH ONCE DAILY IN THE MORNING  
  
 LUTEIN PO Take  by mouth.  
  
 predniSONE 20 mg tablet Commonly known as:  DELTASONE  
3 tabs daily x 3 days, 2 tabs daily x 3 days, 1 tab daily x 3 days, 1/2 tab daily x 3 days  
  
 sertraline 50 mg tablet Commonly known as:  ZOLOFT  
TAKE ONE TABLET BY MOUTH ONCE DAILY  
  
 tiotropium bromide 1.25 mcg/actuation inhaler Commonly known as:  Zuleika Vee Take 2 Puffs by inhalation daily. TUSSIN DM COUGH PO Take  by mouth. TYLENOL 325 mg tablet Generic drug:  acetaminophen Take  by mouth every four (4) hours as needed for Pain. VELCADE INJECTION  
by Injection route. ZyrTEC 10 mg tablet Generic drug:  cetirizine Take  by mouth. Prescriptions Sent to Pharmacy Refills  
 clarithromycin (BIAXIN) 500 mg tablet 0 Si tablet twice per day with food  Class: Normal  
 Pharmacy: [facility], [address] [place] [address] #: [phone]  
 predniSONE (DELTASONE) 20 mg tablet 0  
 Sig: 3 tabs daily x 3 days, 2 tabs daily x 3 days, 1 tab daily x 3 days, 1/2 tab daily x 3 days Class: Normal  
 Pharmacy: [address] [facility] [place], [address] [facility] #: [phone] [facility] SERVICES! Dear Rishabh Juan: Thank you for requesting a [employer] account. Our records indicate that you have previously registered for a [employer] account but its currently inactive. Please call our [employer] support line at [phone]. Additional Information If you have questions, please visit the Frequently Asked Questions section of the [employer] website at [email or web address] Remember, [employer] is NOT to be used for urgent needs. For medical emergencies, dial 911. Now available from your iPhone and Android! Please provide this summary of care documentation to your next provider. Your primary care clinician is listed as Erika Iqbal. If you have any questions after today's visit, please call [phone].

## 2018-01-02 NOTE — PROGRESS NOTES
1. Have you been to the ER, urgent care clinic since your last visit? Hospitalized since your last visit? No    2. Have you seen or consulted any other health care providers outside of the [address] [place] [address] since your last visit? Include any pap smears or colon screening.  No

## 2018-01-04 NOTE — PROGRESS NOTES
The patient presents to the office today with the chief complaint of chest congestion    HPI    The patient complains of chest congestion with cough. The cough is non productive. The patient denies fever. The patient has mild dyspnea. The patient has interstitial lung disease with mild scarring. Review of Systems   Respiratory: Positive for cough and shortness of breath (Mild dyspnea). Cardiovascular: Negative for chest pain and leg swelling. Allergies   Allergen Reactions    Penicillin V Potassium Diarrhea     States not allergic    Shellfish Derived Swelling       Current Outpatient Prescriptions   Medication Sig Dispense Refill    clarithromycin (BIAXIN) 500 mg tablet 1 tablet twice per day with food 20 Tab 0    predniSONE (DELTASONE) 20 mg tablet 3 tabs daily x 3 days, 2 tabs daily x 3 days, 1 tab daily x 3 days, 1/2 tab daily x 3 days 20 Tab 0    hydroCHLOROthiazide (MICROZIDE) 12.5 mg capsule TAKE ONE CAPSULE BY MOUTH ONCE DAILY IN THE MORNING 90 Cap 0    aspirin delayed-release 81 mg tablet Take  by mouth daily.  BORTEZOMIB (VELCADE INJECTION) by Injection route.  LUTEIN PO Take  by mouth.  acyclovir (ZOVIRAX) 400 mg tablet Take 400 mg by mouth every four (4) hours (while awake).  tiotropium bromide (SPIRIVA RESPIMAT) 1.25 mcg/actuation inhaler Take 2 Puffs by inhalation daily. 3 Inhaler 3    acetaminophen (TYLENOL) 325 mg tablet Take  by mouth every four (4) hours as needed for Pain.  naproxen sodium (ALEVE) 220 mg cap Take  by mouth.  clotrimazole-betamethasone (LOTRISONE) topical cream APPLY TO AFFECTED AREA TWICE PER DAY 45 g 2    sertraline (ZOLOFT) 50 mg tablet TAKE ONE TABLET BY MOUTH ONCE DAILY 30 Tab 0    cyanocobalamin 1,000 mcg tablet Take 1,000 mcg by mouth daily.  diazepam (VALIUM) 5 mg tablet Take 5 mg by mouth every six (6) hours as needed for Anxiety.  cetirizine (ZYRTEC) 10 mg tablet Take  by mouth.          Past Medical History:   Diagnosis Date    Acute bronchitis     Acute upper respiratory infections of unspecified site     Cough     Essential hypertension, benign     Meniere's disease, unspecified     Muscle pain     Pain in limb        Past Surgical History:   Procedure Laterality Date    HX HYSTERECTOMY      HX MOHS PROCEDURES Left        Social History     Social History    Marital status:      Spouse name: N/A    Number of children: N/A    Years of education: N/A     Occupational History    Not on file. Social History Main Topics    Smoking status: Never Smoker    Smokeless tobacco: Never Used    Alcohol use No    Drug use: No    Sexual activity: Not Currently     Other Topics Concern    Not on file     Social History Narrative       Patient does have an advanced directive on file    Visit Vitals    /64 (BP 1 Location: Left arm, BP Patient Position: Sitting)    Pulse 70    Temp 97.9 °F (36.6 °C) (Tympanic)    Resp 16    Ht 5' 5\" (1.651 m)    Wt 143 lb (64.9 kg)    SpO2 98%    BMI 23.8 kg/m2       Physical Exam   Cardiovascular: Normal rate and regular rhythm. Exam reveals no gallop. No murmur heard. Pulmonary/Chest: She has no wheezes. She has rales (scattered dry rales bioth lower lung fields). Abdominal: Soft. She exhibits no distension. There is no tenderness. Musculoskeletal: She exhibits no edema. BMI:  OK    No visits with results within 3 Month(s) from this visit. Latest known visit with results is:    Hospital Outpatient Visit on 08/07/2017   Component Date Value Ref Range Status    RPR 08/07/2017 REACTIVE* NR   Final    T. pallidum Ab (FTA-Ab) 08/07/2017 Reactive* Non Reactive   Final    Comment: (NOTE)  Performed At: [address] [id number]  Robi Bueno MD [id number]      RPR titer 08/07/2017 1:4   Final       .No results found for any visits on 01/02/18.     Assessment / Plan      ICD-10-CM ICD-9-CM 1. Bronchitis J40 490    2. Multiple myeloma in remission (HCC) C90.01 203.01    3. ILD (interstitial lung disease) ([facility] [address] J84.9 515        Biaxin  Prednisone  she was advised to continue her maintenance medications  To pulmonary if symptoms persist    Follow-up Disposition:  Return in about 3 months (around 4/2/2018). I asked Rey Esparza if she has any questions and I answered the questions. Cristian Esparza states that she understands the treatment plan and agrees with the treatment plan

## 2018-01-08 ENCOUNTER — OFFICE VISIT (OUTPATIENT)
Dept: INTERNAL MEDICINE CLINIC | Age: 83
End: 2018-01-08

## 2018-01-08 VITALS
HEART RATE: 70 BPM | RESPIRATION RATE: 16 BRPM | OXYGEN SATURATION: 97 % | BODY MASS INDEX: 24.16 KG/M2 | TEMPERATURE: 98.6 F | HEIGHT: 65 IN | WEIGHT: 145 LBS | DIASTOLIC BLOOD PRESSURE: 70 MMHG | SYSTOLIC BLOOD PRESSURE: 132 MMHG

## 2018-01-08 DIAGNOSIS — R07.82 INTERCOSTAL PAIN: Primary | ICD-10-CM

## 2018-01-08 RX ORDER — CELECOXIB 200 MG/1
CAPSULE ORAL
Qty: 30 CAP | Refills: 0 | Status: SHIPPED | OUTPATIENT
Start: 2018-01-08 | End: 2018-02-05 | Stop reason: ALTCHOICE

## 2018-01-08 NOTE — PROGRESS NOTES
1. Have you been to the ER, urgent care clinic since your last visit? Hospitalized since your last visit? No    2. Have you seen or consulted any other health care providers outside of the [address] [place] [address] since your last visit? Include any pap smears or colon screening.  No

## 2018-01-09 ENCOUNTER — HOSPITAL ENCOUNTER (OUTPATIENT)
Dept: GENERAL RADIOLOGY | Age: 83
Discharge: HOME | End: 2018-01-09
Payer: MEDICARE

## 2018-01-09 DIAGNOSIS — R07.82 INTERCOSTAL PAIN: ICD-10-CM

## 2018-01-09 PROCEDURE — 71100 X-RAY EXAM RIBS UNI 2 VIEWS: CPT

## 2018-01-09 PROCEDURE — 71046 X-RAY EXAM CHEST 2 VIEWS: CPT

## 2018-01-10 NOTE — PROGRESS NOTES
The patient presents to the office today with the chief complaint of left chest pain    HPI    The patient is being treated for a respiratory infection with antibiotics and prednisone. The patient has improvement of the chest congestion. A mild cough persists. The patient now has 24 hours of left lower chest pain. Moderate in severity. Increased with deep breaths, coughing, or movement. Review of Systems   Respiratory: Negative for shortness of breath. Cardiovascular: Positive for chest pain. Negative for leg swelling. Allergies   Allergen Reactions    Penicillin V Potassium Diarrhea     States not allergic    Shellfish Derived Swelling       Current Outpatient Prescriptions   Medication Sig Dispense Refill    celecoxib (CELEBREX) 200 mg capsule 1 tablet twice per day as needed for pain 30 Cap 0    clarithromycin (BIAXIN) 500 mg tablet 1 tablet twice per day with food 20 Tab 0    predniSONE (DELTASONE) 20 mg tablet 3 tabs daily x 3 days, 2 tabs daily x 3 days, 1 tab daily x 3 days, 1/2 tab daily x 3 days 20 Tab 0    hydroCHLOROthiazide (MICROZIDE) 12.5 mg capsule TAKE ONE CAPSULE BY MOUTH ONCE DAILY IN THE MORNING 90 Cap 0    aspirin delayed-release 81 mg tablet Take  by mouth daily.  BORTEZOMIB (VELCADE INJECTION) by Injection route.  LUTEIN PO Take  by mouth.  acyclovir (ZOVIRAX) 400 mg tablet Take 400 mg by mouth every four (4) hours (while awake).  tiotropium bromide (SPIRIVA RESPIMAT) 1.25 mcg/actuation inhaler Take 2 Puffs by inhalation daily. 3 Inhaler 3    acetaminophen (TYLENOL) 325 mg tablet Take  by mouth every four (4) hours as needed for Pain.  naproxen sodium (ALEVE) 220 mg cap Take  by mouth.  clotrimazole-betamethasone (LOTRISONE) topical cream APPLY TO AFFECTED AREA TWICE PER DAY 45 g 2    sertraline (ZOLOFT) 50 mg tablet TAKE ONE TABLET BY MOUTH ONCE DAILY 30 Tab 0    cyanocobalamin 1,000 mcg tablet Take 1,000 mcg by mouth daily.       diazepam (VALIUM) 5 mg tablet Take 5 mg by mouth every six (6) hours as needed for Anxiety.  cetirizine (ZYRTEC) 10 mg tablet Take  by mouth. Past Medical History:   Diagnosis Date    Acute bronchitis     Acute upper respiratory infections of unspecified site     Cough     Essential hypertension, benign     Meniere's disease, unspecified     Muscle pain     Pain in limb        Past Surgical History:   Procedure Laterality Date    HX HYSTERECTOMY      HX MOHS PROCEDURES Left        Social History     Social History    Marital status:      Spouse name: N/A    Number of children: N/A    Years of education: N/A     Occupational History    Not on file. Social History Main Topics    Smoking status: Never Smoker    Smokeless tobacco: Never Used    Alcohol use No    Drug use: No    Sexual activity: Not Currently     Other Topics Concern    Not on file     Social History Narrative       Patient does have an advanced directive on file    Visit Vitals    /70 (BP 1 Location: Left arm, BP Patient Position: Sitting)    Pulse 70    Temp 98.6 °F (37 °C) (Tympanic)    Resp 16    Ht 5' 5\" (1.651 m)    Wt 145 lb (65.8 kg)    SpO2 97%    BMI 24.13 kg/m2       Physical Exam   Neck: Carotid bruit is not present. Cardiovascular: Normal rate and regular rhythm. Exam reveals no gallop. No murmur heard. Pulmonary/Chest: She has no wheezes. She has no rales. She exhibits no tenderness. Musculoskeletal: She exhibits no edema. BMI:  OK    No visits with results within 3 Month(s) from this visit.   Latest known visit with results is:    Hospital Outpatient Visit on 08/07/2017   Component Date Value Ref Range Status    RPR 08/07/2017 REACTIVE* NR   Final    T. pallidum Ab (FTA-Ab) 08/07/2017 Reactive* Non Reactive   Final    Comment: (NOTE)  Performed At: [address] [id number]  Betty Snyder MD [id number]      RPR titer 08/07/2017 1:4   Final       .No results found for any visits on 01/08/18. Assessment / Plan      ICD-10-CM ICD-9-CM    1. Intercostal pain R07.82 786.59 XR CHEST PA LAT      XR RIBS LT UNI 2 V       Xrays ordered  Celebrex for 2 weeks  she was advised to continue her maintenance medications      Follow-up Disposition:  Return in about 3 months (around 4/8/2018). I asked Eliana Milian. Geraldine Washington if she has any questions and I answered the questions. Adrienne Washington states that she understands the treatment plan and agrees with the treatment plan

## 2018-02-05 ENCOUNTER — OFFICE VISIT (OUTPATIENT)
Dept: INTERNAL MEDICINE CLINIC | Age: 83
End: 2018-02-05

## 2018-02-05 VITALS
DIASTOLIC BLOOD PRESSURE: 60 MMHG | SYSTOLIC BLOOD PRESSURE: 118 MMHG | OXYGEN SATURATION: 97 % | BODY MASS INDEX: 24.16 KG/M2 | RESPIRATION RATE: 16 BRPM | HEIGHT: 65 IN | WEIGHT: 145 LBS | HEART RATE: 88 BPM | TEMPERATURE: 98.8 F

## 2018-02-05 DIAGNOSIS — J40 BRONCHITIS: ICD-10-CM

## 2018-02-05 DIAGNOSIS — C90.01 MULTIPLE MYELOMA IN REMISSION (HCC): Primary | ICD-10-CM

## 2018-02-05 DIAGNOSIS — J84.9 ILD (INTERSTITIAL LUNG DISEASE) (HCC): ICD-10-CM

## 2018-02-05 NOTE — PROGRESS NOTES
1. Have you been to the ER, urgent care clinic since your last visit? Hospitalized since your last visit? No    2. Have you seen or consulted any other health care providers outside of the [address] [place] [address] since your last visit? Include any pap smears or colon screening.  Atenza - chemo shot

## 2018-02-05 NOTE — MR AVS SNAPSHOT
[address], [address] [place] [address] 
[phone] Patient: Bronwyn Lopez MRN: [id number] Visit Information Date & Time Provider Department Dept. Phone Encounter #  
 2/5/2018  2:00 PM Omar Rojas MD [facility] INTERNAL MEDICINE [facility] [phone] [id number] Follow-up Instructions Return in about 3 months (around 5/5/2018). Upcoming Health Maintenance Date Due DTaP/Tdap/Td series (1 - Tdap) 7/25/1989 Pneumococcal 65+ High/Highest Risk (2 of 2 - PCV13) 9/19/2012 MEDICARE YEARLY EXAM 3/11/2018 BREAST CANCER SCRN MAMMOGRAM 8/24/2018 GLAUCOMA SCREENING Q2Y 3/22/2019 Allergies as of 2/5/2018  Review Complete On: 2/5/2018 By: Sharad Estrella, YAJAIRA Severity Noted Reaction Type Reactions Penicillin V Potassium    Diarrhea States not allergic Shellfish Derived  04/21/2016    Swelling Current Immunizations  Reviewed on 2/2/2018 Name Date Influenza High Dose Vaccine PF 12/12/2016 Influenza Vaccine 11/24/2017 Influenza Vaccine (Quad) PF 9/25/2015  1:00 PM  
 Pneumococcal Polysaccharide (PPSV-23) 9/19/2011, 10/20/1993 Td 7/24/1989 Zoster Vaccine, Live 2/8/2008 Not reviewed this visit Vitals BP Pulse Temp Resp Height(growth percentile)  
 118/60 (BP 1 Location: Left arm, BP Patient Position: Sitting) 88 98.8 °F (37.1 °C) (Tympanic) 16 5' 5\" (1.651 m) Weight(growth percentile) SpO2 BMI OB Status Smoking Status 145 lb (65.8 kg) 97% 24.13 kg/m2 Hysterectomy Never Smoker BMI and BSA Data Body Mass Index Body Surface Area  
 24.13 kg/m 2 1.74 m 2 Preferred Pharmacy Pharmacy Name Phone [phone] [facility] [phone] Your Updated Medication List  
  
   
This list is accurate as of: 2/5/18  2:32 PM.  Always use your most recent med list.  
  
  
  
  
 acyclovir 400 mg tablet Commonly known as:  ZOVIRAX Take 400 mg by mouth every four (4) hours (while awake). aspirin delayed-release 81 mg tablet Take  by mouth daily. celecoxib 200 mg capsule Commonly known as:  CELEBREX  
1 tablet twice per day as needed for pain  
  
 clotrimazole-betamethasone topical cream  
Commonly known as:  LOTRISONE  
APPLY TO AFFECTED AREA TWICE PER DAY  
  
 cyanocobalamin 1,000 mcg tablet Take 1,000 mcg by mouth daily. diazePAM 5 mg tablet Commonly known as:  VALIUM Take 5 mg by mouth every six (6) hours as needed for Anxiety. hydroCHLOROthiazide 12.5 mg capsule Commonly known as:  Julissa Shutters TAKE ONE CAPSULE BY MOUTH ONCE DAILY IN THE MORNING  
  
 LUTEIN PO Take  by mouth. sertraline 50 mg tablet Commonly known as:  ZOLOFT  
TAKE ONE TABLET BY MOUTH ONCE DAILY  
  
 tiotropium bromide 1.25 mcg/actuation inhaler Commonly known as:  Buddie Mediate Take 2 Puffs by inhalation daily. TYLENOL 325 mg tablet Generic drug:  acetaminophen Take  by mouth every four (4) hours as needed for Pain. VELCADE INJECTION  
by Injection route. ZyrTEC 10 mg tablet Generic drug:  cetirizine Take  by mouth. Follow-up Instructions Return in about 3 months (around 5/5/2018). Patient Instructions Health Maintenance Due Topic  DTaP/Tdap/Td series (1 - Tdap)  Pneumococcal 65+ High/Highest Risk (2 of 2 - PCV13) Introducing [facility]! Dear Arnie De Oliveira: Thank you for requesting a [employer] account. Our records indicate that you have previously registered for a [employer] account but its currently inactive. Please call our [employer] support line at [phone]. Additional Information If you have questions, please visit the Frequently Asked Questions section of the [employer] website at [email or web address] Remember, MyChart is NOT to be used for urgent needs. For medical emergencies, dial 911. Now available from your iPhone and Android! Please provide this summary of care documentation to your next provider. Your primary care clinician is listed as Yonatan Koch. If you have any questions after today's visit, please call [phone].

## 2018-02-06 NOTE — PROGRESS NOTES
The patient presents to the office today with the chief complaint of bronchitis    HPI  The patient is status post therapy for bronchitis. Her symptoms have greatly improved. She has no congestion. She is back to her baseline of chronic cough. The patient remains on medications for multiple myeloma. The illness remains in remission. Review of Systems   Respiratory: Positive for cough. Negative for shortness of breath. Cardiovascular: Negative for chest pain and leg swelling. Allergies   Allergen Reactions    Penicillin V Potassium Diarrhea     States not allergic    Shellfish Derived Swelling       Current Outpatient Prescriptions   Medication Sig Dispense Refill    hydroCHLOROthiazide (MICROZIDE) 12.5 mg capsule TAKE ONE CAPSULE BY MOUTH ONCE DAILY IN THE MORNING 90 Cap 0    aspirin delayed-release 81 mg tablet Take  by mouth daily.  BORTEZOMIB (VELCADE INJECTION) by Injection route.  LUTEIN PO Take  by mouth.  acyclovir (ZOVIRAX) 400 mg tablet Take 400 mg by mouth every four (4) hours (while awake).  tiotropium bromide (SPIRIVA RESPIMAT) 1.25 mcg/actuation inhaler Take 2 Puffs by inhalation daily. 3 Inhaler 3    acetaminophen (TYLENOL) 325 mg tablet Take  by mouth every four (4) hours as needed for Pain.  clotrimazole-betamethasone (LOTRISONE) topical cream APPLY TO AFFECTED AREA TWICE PER DAY 45 g 2    sertraline (ZOLOFT) 50 mg tablet TAKE ONE TABLET BY MOUTH ONCE DAILY 30 Tab 0    cyanocobalamin 1,000 mcg tablet Take 1,000 mcg by mouth daily.  diazepam (VALIUM) 5 mg tablet Take 5 mg by mouth every six (6) hours as needed for Anxiety.  cetirizine (ZYRTEC) 10 mg tablet Take  by mouth.          Past Medical History:   Diagnosis Date    Acute bronchitis     Acute upper respiratory infections of unspecified site     Cough     Essential hypertension, benign     Meniere's disease, unspecified     Muscle pain     Pain in limb        Past Surgical History:   Procedure Laterality Date    HX HYSTERECTOMY      HX MOHS PROCEDURES Left        Social History     Social History    Marital status:      Spouse name: N/A    Number of children: N/A    Years of education: N/A     Occupational History    Not on file. Social History Main Topics    Smoking status: Never Smoker    Smokeless tobacco: Never Used    Alcohol use No    Drug use: No    Sexual activity: Not Currently     Other Topics Concern    Not on file     Social History Narrative       Patient does have an advanced directive on file    Visit Vitals    /60 (BP 1 Location: Left arm, BP Patient Position: Sitting)    Pulse 88    Temp 98.8 °F (37.1 °C) (Tympanic)    Resp 16    Ht 5' 5\" (1.651 m)    Wt 145 lb (65.8 kg)    SpO2 97%    BMI 24.13 kg/m2       Physical Exam   Cardiovascular: Normal rate and regular rhythm. Exam reveals no gallop. No murmur heard. Pulmonary/Chest: She has no wheezes. She has rales (Dry rales in all chavez). BMI:  OK    No visits with results within 3 Month(s) from this visit. Latest known visit with results is:    Hospital Outpatient Visit on 08/07/2017   Component Date Value Ref Range Status    RPR 08/07/2017 REACTIVE* NR   Final    T. pallidum Ab (FTA-Ab) 08/07/2017 Reactive* Non Reactive   Final    Comment: (NOTE)  Performed At: [address] [id number]  Claudia Elias MD RX:[id number]      RPR titer 08/07/2017 1:4   Final       .No results found for any visits on 02/05/18. Assessment / Plan      ICD-10-CM ICD-9-CM    1. Multiple myel[facility] remission ([facility] C90.01 203.01    2. ILD (interstitial lung disease) ([facility] J84.9 515    3. Bronchitis J40 490        she was advised to continue her maintenance medications  Follow pulmonary symptoms    Follow-up Disposition:  Return in about 3 months (around 5/5/2018). I asked Ruth Fox if she has any questions and I answered the questions. Simran Daigle states that she understands the treatment plan and agrees with the treatment plan

## 2018-02-19 ENCOUNTER — TELEPHONE (OUTPATIENT)
Dept: INTERNAL MEDICINE CLINIC | Age: 83
End: 2018-02-19

## 2018-02-19 NOTE — TELEPHONE ENCOUNTER
Patient called and thinks she might have the flu. She wanted to speak with Dr April White. I tried to schedule appointment with Buster Boateng and she didn't want it. She wanted to wait to talk to Dr April White tomorrow. Please call her at [phone].

## 2018-02-20 RX ORDER — BENZONATATE 100 MG/1
100 CAPSULE ORAL
Qty: 21 CAP | Refills: 0 | Status: SHIPPED | OUTPATIENT
Start: 2018-02-20 | End: 2018-02-27

## 2018-02-20 RX ORDER — CEFUROXIME AXETIL 500 MG/1
500 TABLET ORAL 2 TIMES DAILY
Qty: 14 TAB | Refills: 0 | Status: SHIPPED | OUTPATIENT
Start: 2018-02-20 | End: 2018-03-20 | Stop reason: ALTCHOICE

## 2018-02-20 NOTE — TELEPHONE ENCOUNTER
Spoke with patient, she is c/o sore throat and a cough. Dr Rhoda St notified, and sent in a prescription for ceftin and tesalon perls for her cough. Patient notified.  Voices understanding

## 2018-02-22 ENCOUNTER — TELEPHONE (OUTPATIENT)
Dept: INTERNAL MEDICINE CLINIC | Age: 83
End: 2018-02-22

## 2018-02-22 RX ORDER — HYDROCHLOROTHIAZIDE 12.5 MG/1
CAPSULE ORAL
Qty: 90 CAP | Refills: 0 | Status: SHIPPED | OUTPATIENT
Start: 2018-02-22 | End: 2018-05-22 | Stop reason: SDUPTHER

## 2018-02-22 NOTE — TELEPHONE ENCOUNTER
Dr Micki Zelaya Notified, said to stop ceftin and call us Monday to let us know how she is doing.  and to call us sooner  if not getting any better

## 2018-02-22 NOTE — TELEPHONE ENCOUNTER
Patient called because she thinks she is having an allergic reaction to the Ceftin that Dr Елена Herrera prescribed. She has taken 3 of them. She is itching on her abdomen, legs and arms. She is not feeling right. Please call her at [phone].

## 2018-02-27 RX ORDER — PREDNISONE 20 MG/1
TABLET ORAL
Qty: 21 TAB | Refills: 0 | Status: SHIPPED | OUTPATIENT
Start: 2018-02-27 | End: 2018-03-20 | Stop reason: ALTCHOICE

## 2018-03-07 RX ORDER — SERTRALINE HYDROCHLORIDE 50 MG/1
TABLET, FILM COATED ORAL
Qty: 90 TAB | Refills: 3 | Status: SHIPPED | OUTPATIENT
Start: 2018-03-07 | End: 2018-03-20 | Stop reason: ALTCHOICE

## 2018-03-14 ENCOUNTER — PATIENT OUTREACH (OUTPATIENT)
Dept: INTERNAL MEDICINE CLINIC | Age: 83
End: 2018-03-14

## 2018-03-14 RX ORDER — CODEINE PHOSPHATE AND GUAIFENESIN 10; 100 MG/5ML; MG/5ML
5 SOLUTION ORAL
COMMUNITY
End: 2018-03-20 | Stop reason: ALTCHOICE

## 2018-03-14 RX ORDER — LEVOFLOXACIN 500 MG/1
TABLET, FILM COATED ORAL DAILY
COMMUNITY
Start: 2018-03-14 | End: 2018-03-18

## 2018-03-14 RX ORDER — AMOXICILLIN AND CLAVULANATE POTASSIUM 875; 125 MG/1; MG/1
TABLET, FILM COATED ORAL EVERY 12 HOURS
COMMUNITY
Start: 2018-03-14 | End: 2018-03-18

## 2018-03-14 NOTE — PROGRESS NOTES
Transitions of Care Coordination    Laurent Ma was hospitalized at [facility] 3/10-3/13/18 for CAP, sepsis, respiratory failure and discharged to home with [facility]. RRAT not available    Portions of the Discharge Summary written by Dr. Jazmine Funes on 3/13/18 are below in italics. Discharge Diagnosis:   1. Community acquired pneumonia (POA), LLL with sepsis (POA)  2. Acute hypoxic respiratory failure (POA) due to pneumonia  3. Leukocytosis, multifactorial  4. Abnormal procalcitonin  5. Steroid induced hyperglycemia (A1C = 6.8)  6. Hypokalemia  7. Multiple myeloma on chemo; Dr. Negrito Gibson  8. Chronic anemia  9. Chronic thrombocytopenia  10. HTN  11. COPD, not in exacerbation     CONCERNS REQUIRING CLOSE FOLLOW UP:  12. PCP 1 week for repeat CBC to ensure resolution of leukocytosis. Discharge Procedure Orders  Follow Up with PCP within 7 days   Follow up with Jude Sanderson MD within 7 days. Admitting HPI (per admitting physician):  Laurent Ma is a 80year old with a history of MM who presented to the ED secondary to generalized weakness and fatigue. She states that she slid out of bed yesterday and was unable to get up due to generalized weakness. She has had a mild cough with little sputum production. She denies fevers or chills. Her daughter fond her on the floor this morning and helped her up. She denies pain. She denies SOB or CP. She received her last chemo dose within the past week. Hospital Course: The following problems were addressed during hospitalization:     Pneumonia due to organism - POA  Culture negative at 48 hours. WBC down to 15K, repeat CBC in 1 week  Complete Levaquin/Augmentin x 4 more days (7 days total)  Had mild hypoxia on admission; resolved with treatment.      Multiple myeloma ([address]  Follows with Dr Negrito Gibson  Last chemo was last week     Benign hypertension  Continue home meds     Leukocytosis  Likely combination of infection, MM, and steroid use  Follow CBC in 1 week. Hyperglycemia - suspect due to steroids    Most Recent BMP and CBC:        Recent Labs      03/12/18   0214   GLUCOSE  105*   BUN  14   CALCIUM  7.7*   CREAT  0.8   NA  144   POTASSIUM  3.4*   CHLORIDE  111*   CO2  21          Recent Labs      03/13/18   0541   WBC  15.4*   RBC  3.53*   HEMOGLOBIN  10.5*   HCT  33.8*   MCV  96*   MCH  30   MCHC  31   PLATELET  869*   RDW  14.9       Sepsis Transition of Care Note     Discharge vital signs: Physical Exam on Discharge:  /65   Pulse 64   Temp 96.2 °F (35.7 °C)   Resp 18   Ht 5' 1.5\" (1.562 m)   Wt 66.3 kg (146 lb 2.6 oz)   SpO2 96%   BMI 27.17 kg/m²      Source of Infection:  Pneumonia     Antibiotic prescribed at discharge: Levaquin, Augmentin Length of remaining treatment: four days for both    Infectious Disease Consult during admission: no  Patient Questions:     Are you taking your prescribed antibiotic? yes   Have you needed any fever reducers: no  What is your respiratory rate? unable to assess  What is your heart rate? unable to assess  Scheduled follow up appointment with PCP: daughter stated she will call to schedule    NN provided patient/caregive education to daughter as follows :    Sepsis is a very serious illness. The infection is caused by germs called bacteria. That can spread through your body very quickly. It will cause you to not feel well when you should be getting better. The infection makes it hard for oxygen  and nutrients to get to body organs, like the kidneys and lungs, and they may start to have problems working. Sepsis is so serious that it  may even cause death in some people who have a weaken immune systems or other health problems if their bodies are not able to fight infections. So it is important that we stay in touch with you after you are discharged from the hospital to watch you for any problems that may arise.    When we check on you we want to know if you are experiencing any of the following signs and symptoms:    Signs     Confusion   Fast breathing   Chills or shaking   Fever or low body temperature   Fast heart beat   Lightheadedness   Less urine output   Skin rash or skin color changes    Are you at risk? Anyone can get sepsis, but certain people have a greater risk, such as:     People with catheters   Those with severe burns or trauma   Babies younger than 1 year   People over the age of 72   Those with chronic diseases like liver disease, kidney disease or cancer   Those who have weak immune systems because of treatments or illness    Prevention    When you get home from the hospital it is important that you: If you are on antibiotic you need to take it to completion. Report any of the above signs and symptoms of  to your provider or your navigator that is checking on you in the first 30 days you are home. Making sure you wash your hands frequently it is the best way to prevent germs from spreading. You can wash your hands with well with soap and water or use alcohol based hand  . Be sure anyone helping with your care or visiting you also washes their hands before touching you. Staying current on vaccines, like the flu and pneumonia will also help to prevent infection. If have been treated for sepsis in the hospital it is important that you report any worsening symptoms as well. The key to a safe transition from the hospital is being an informed patient and working with your health care team very closely over the next 30 days once you get home. Treatment     If signs and symptoms develop, your provider will see you in the office and discuss the best place for you to receive care. Your provider will likely order blood tests or other tests may be done to check for Sepsis. You may need to start on intravenous(IV) antibiotics, oxygen, and other medicines may be part of your treatment.   If body organs start to  not work properly , other treatments may be needed and you will likely have to go back to the hospital.     Reached patient's daughter, Dolores Almeida ([facility],) and identified self/role. Per daughter patient is tired and weaker than baseline but able to shower/dress self this morning. Patient is hard of hearing and generally does not answer the phone. Daughter stated:  -new prescriptions were filled and patient is taking two new antibiotics  -patient manages own medications  -patient lives in daughter's home and has assistance if needed  -patient has good appetite  -no phone call yet from [facility]  -will call to schedule apt with Dr. Martinez Ear    Phone call to [facility] to check on referral and provide daughter's phone number for scheduling of home care visits. Advised daughter this writer would contact Geremias. Opportunity to ask questions was provided. Contact information was given for future questions, concerns or assistance. Will follow. Goals        Post Hospitalization     Attends follow-up appointments as directed. Plan:  Monitor for attendance at apts       Understands red flags post discharge.             Plan:  Educate re s/s of sepsis and review at each contact  Signs     Confusion   Fast breathing   Chills or shaking   Fever or low body temperature   Fast heart beat   Lightheadedness   Less urine output   Skin rash or skin color changes

## 2018-03-19 ENCOUNTER — OFFICE VISIT (OUTPATIENT)
Dept: INTERNAL MEDICINE CLINIC | Age: 83
End: 2018-03-19

## 2018-03-19 VITALS
HEART RATE: 91 BPM | TEMPERATURE: 97.8 F | WEIGHT: 145 LBS | DIASTOLIC BLOOD PRESSURE: 68 MMHG | HEIGHT: 65 IN | BODY MASS INDEX: 24.16 KG/M2 | OXYGEN SATURATION: 97 % | RESPIRATION RATE: 18 BRPM | SYSTOLIC BLOOD PRESSURE: 132 MMHG

## 2018-03-19 DIAGNOSIS — J84.9 ILD (INTERSTITIAL LUNG DISEASE) (HCC): Primary | ICD-10-CM

## 2018-03-19 DIAGNOSIS — J15.9 BACTERIAL PNEUMONIA: ICD-10-CM

## 2018-03-19 DIAGNOSIS — C90.01 MULTIPLE MYELOMA IN REMISSION (HCC): ICD-10-CM

## 2018-03-19 RX ORDER — POTASSIUM CHLORIDE 750 MG/1
TABLET, EXTENDED RELEASE ORAL
Qty: 60 TAB | Refills: 0 | Status: SHIPPED | OUTPATIENT
Start: 2018-03-19 | End: 2018-04-25 | Stop reason: ALTCHOICE

## 2018-03-19 NOTE — PROGRESS NOTES
The patient presents to the office today with a chief complain of pneumonia and for transition of care    HPI:    The patient was recently hospitalized at [facility] for community acquired pneumonia. The patient was discharged on 3/13/18 and contacted by Nicole Olmedo on 3/14/18 for follow up. An appointment was made for the patient to see me today. The patient is feeling better. The patient has preexisting intersitial lung disease. The patient has mild dyspnea but this is stable. The patient has no more fever. The has finished up the antibiotics. The patient has been on therapy for multiple myeloma. Therapy is on hold while she recovers from the infection. Review of Systems   Constitutional: Negative for fever. Respiratory: Negative for cough and shortness of breath. Allergies   Allergen Reactions    Penicillin V Potassium Diarrhea     States not allergic    Shellfish Derived Swelling       Current Outpatient Prescriptions   Medication Sig Dispense Refill    potassium chloride (KLOR-CON) 10 mEq tablet 1 tab twice per day (stop \"old\" potassium 60 Tab 0    hydroCHLOROthiazide (MICROZIDE) 12.5 mg capsule TAKE ONE CAPSULE BY MOUTH ONCE DAILY IN THE MORNING 90 Cap 0    aspirin delayed-release 81 mg tablet Take  by mouth daily.  BORTEZOMIB (VELCADE INJECTION) by Injection route.  acyclovir (ZOVIRAX) 400 mg tablet Take 400 mg by mouth two (2) times a day.  tiotropium bromide (SPIRIVA RESPIMAT) 1.25 mcg/actuation inhaler Take 2 Puffs by inhalation daily. 3 Inhaler 3    acetaminophen (TYLENOL) 325 mg tablet Take  by mouth every four (4) hours as needed for Pain.  clotrimazole-betamethasone (LOTRISONE) topical cream APPLY TO AFFECTED AREA TWICE PER DAY 45 g 2    sertraline (ZOLOFT) 50 mg tablet TAKE ONE TABLET BY MOUTH ONCE DAILY 30 Tab 0    cyanocobalamin 1,000 mcg tablet Take 1,000 mcg by mouth daily.       diazepam (VALIUM) 5 mg tablet Take 5 mg by mouth every six (6) hours as needed for Anxiety.  cetirizine (ZYRTEC) 10 mg tablet Take  by mouth. Past Medical History:   Diagnosis Date    Acute bronchitis     Acute upper respiratory infections of unspecified site     Cough     Essential hypertension, benign     Meniere's disease, unspecified     Muscle pain     Pain in limb        Past Surgical History:   Procedure Laterality Date    HX HYSTERECTOMY      HX MOHS PROCEDURES Left        Social History     Social History    Marital status:      Spouse name: N/A    Number of children: N/A    Years of education: N/A     Occupational History    Not on file. Social History Main Topics    Smoking status: Never Smoker    Smokeless tobacco: Never Used    Alcohol use No    Drug use: No    Sexual activity: Not Currently     Other Topics Concern    Not on file     Social History Narrative       Patient does have an advanced directive on file    Visit Vitals    /68 (BP 1 Location: Left arm, BP Patient Position: Sitting)    Pulse 91    Temp 97.8 °F (36.6 °C) (Tympanic)    Resp 18    Ht 5' 5\" (1.651 m)    Wt 145 lb (65.8 kg)    SpO2 97%    BMI 24.13 kg/m2       Physical Exam   No Cervical Lymphadenopathy  No Supraclavicular Lymphadenopathy  Thyroid is Normal  Lungs are normal to percussion. Diffuse dry rales to auscultation   Heart:  S1 S2 are normal, No gallops, No mummers  No Carotid Bruits  Abdomen:  Normal Bowel Sounds. No tenderness. No masses. No Hepatomegaly or Splenomegly  LE:  Strong Pedal Pulses. No Edema      BMI:  OK    No visits with results within 3 Month(s) from this visit.   Latest known visit with results is:    Hospital Outpatient Visit on 08/07/2017   Component Date Value Ref Range Status    RPR 08/07/2017 REACTIVE* NR   Final    T. pallidum Ab (FTA-Ab) 08/07/2017 Reactive* Non Reactive   Final    Comment: (NOTE)  Performed At: [facility] [place], [place] [id number]  Rufina Melgoza MD [id number]      RPR titer 08/07/2017 1:4   Final       .No results found for any visits on 03/19/18. Assessment / Plan:        ICD-10-CM ICD-9-CM    1. ILD (interstitial lung disease) ([facility] J84.9 515    2. Multiple myeloma in remission ([facility]) C90.01 203.01    3. Bacterial pneumonia J15.9 482.9        Change potassium 10 mEq BID  Medications were reconciled with the patient and the hospital record during this visit      Follow-up Disposition:  Return in about 4 weeks (around 4/16/2018). I asked Elicia Zhang if she has any questions and I answered the questions. Mami Zhang states that she understands the treatment plan and agrees with the treatment plan

## 2018-03-19 NOTE — MR AVS SNAPSHOT
[address] [facility] [address] 
 
 
 [address] [facility] [address] 
[phone] Patient: Azul Peña MRN: [id number] Visit Information Date & Time Provider Department Dept. Phone Encounter #  
 3/19/2018  2:45 PM Raul Owens MD [facility] INTERNAL MEDICINE OF [address] [id number] Follow-up Instructions Return in about 4 weeks (around 4/16/2018). Your Appointments 4/17/2018  2:00 PM  
Follow Up with Raul Owens MD  
[facility] [address] [facility]) Appt Note: 4wk  
 [address] [place] [address], [address] [facility] [address]  
[phone]  
  
   
 [address] [facility] [address]  
  
    
 6/4/2018  2:00 PM  
Follow Up with Raul Owens MD  
[facility] INTERNAL MEDICINE OF [place] [address] [facility]) Appt Note: 4MO  
 [address] [place] [address], [address] [facility] [address]  
[phone] Upcoming Health Maintenance Date Due DTaP/Tdap/Td series (1 - Tdap) 7/25/1989 Bone Densitometry (Dexa) Screening 7/23/1996 Pneumococcal 65+ High/Highest Risk (2 of 2 - PCV13) 9/19/2012 MEDICARE YEARLY EXAM 3/14/2018 BREAST CANCER SCRN MAMMOGRAM 8/24/2018 GLAUCOMA SCREENING Q2Y 3/22/2019 Allergies as of 3/19/2018  Review Complete On: 3/19/2018 By: Saleem Buenrostro LPN Severity Noted Reaction Type Reactions Penicillin V Potassium    Diarrhea States not allergic Shellfish Derived  04/21/2016    Swelling Current Immunizations  Reviewed on 3/16/2018 Name Date Influenza High Dose Vaccine PF 12/12/2016 Influenza Vaccine 11/24/2017 Influenza Vaccine (Quad) PF 9/25/2015  1:00 PM  
 Pneumococcal Polysaccharide (PPSV-23) 9/19/2011, 10/20/1993 Td 7/24/1989 Zoster Vaccine, Live 2/8/2008 Not reviewed this visit Vitals BP Pulse Temp Resp Height(growth percentile) 132/68 (BP 1 Location: Left arm, BP Patient Position: Sitting) 91 97.8 °F (36.6 °C) (Tympanic) 18 5' 5\" (1.651 m) Weight(growth percentile) SpO2 BMI OB Status Smoking Status 145 lb (65.8 kg) 97% 24.13 kg/m2 Hysterectomy Never Smoker BMI and BSA Data Body Mass Index Body Surface Area  
 24.13 kg/m 2 1.74 m 2 Preferred Pharmacy Pharmacy Name Phone [facility] [phone], [address] [facility] [phone] Your Updated Medication List  
  
   
This list is accurate as of 3/19/18  4:41 PM.  Always use your most recent med list.  
  
  
  
  
 acyclovir 400 mg tablet Commonly known as:  ZOVIRAX Take 400 mg by mouth two (2) times a day. aspirin delayed-release 81 mg tablet Take  by mouth daily. cefUROXime 500 mg tablet Commonly known as:  CEFTIN Take 1 Tab by mouth two (2) times a day. clotrimazole-betamethasone topical cream  
Commonly known as:  LOTRISONE  
APPLY TO AFFECTED AREA TWICE PER DAY  
  
 cyanocobalamin 1,000 mcg tablet Take 1,000 mcg by mouth daily. diazePAM 5 mg tablet Commonly known as:  VALIUM Take 5 mg by mouth every six (6) hours as needed for Anxiety. guaiFENesin-codeine 100-10 mg/5 mL solution Commonly known as:  ROBITUSSIN AC Take 5 mL by mouth four (4) times daily as needed for Cough (5-10 ml every 6 hours as needed for cough). Indications: Cough  
  
 hydroCHLOROthiazide 12.5 mg capsule Commonly known as:  Kang Gaunt TAKE ONE CAPSULE BY MOUTH ONCE DAILY IN THE MORNING  
  
 LUTEIN PO Take  by mouth.  
  
 potassium chloride 10 mEq tablet Commonly known as:  KLOR-CON  
1 tab twice per day (stop \"old\" potassium  
  
 predniSONE 20 mg tablet Commonly known as:  DELTASONE  
3 tabs daily x 3 days, 2 tabs daily x 3 days, 1 tab daily x 3 days, 1/2 tab daily x 3 days * sertraline 50 mg tablet Commonly known as:  ZOLOFT  
TAKE ONE TABLET BY MOUTH ONCE DAILY * sertraline 50 mg tablet Commonly known as:  ZOLOFT  
TAKE ONE TABLET BY MOUTH ONCE DAILY  
  
 tiotropium bromide 1.25 mcg/actuation inhaler Commonly known as:  Tessie Anger Take 2 Puffs by inhalation daily. TYLENOL 325 mg tablet Generic drug:  acetaminophen Take  by mouth every four (4) hours as needed for Pain. VELCADE INJECTION  
by Injection route. ZyrTEC 10 mg tablet Generic drug:  cetirizine Take  by mouth. * Notice: This list has 2 medication(s) that are the same as other medications prescribed for you. Read the directions carefully, and ask your doctor or other care provider to review them with you. Prescriptions Sent to Pharmacy Refills  
 potassium chloride (KLOR-CON) 10 mEq tablet 0 Si tab twice per day (stop \"old\" potassium Class: Normal  
 Pharmacy: [address] [place] [address] #: [phone] Follow-up Instructions Return in about 4 weeks (around 2018). Introducing [facility] & HEALTH SERVICES! Dear Sky Soto: Thank you for requesting a [employer] account. Our records indicate that you have previously registered for a [employer] account but its currently inactive. Please call our [employer] support line at [phone]. Additional Information If you have questions, please visit the Frequently Asked Questions section of the [employer] website at [email or web address] Remember, Orth[employer] is NOT to be used for urgent needs. For medical emergencies, dial 911. Now available from your iPhone and Android! Please provide this summary of care documentation to your next provider. Your primary care clinician is listed as Rakesh Chavez. If you have any questions after today's visit, please call [phone].

## 2018-03-20 ENCOUNTER — PATIENT OUTREACH (OUTPATIENT)
Dept: INTERNAL MEDICINE CLINIC | Age: 83
End: 2018-03-20

## 2018-03-20 NOTE — PROGRESS NOTES
Transitions of Care Coordination    Follow up for hospitalization at [facility] 3/10-3/13/18 for CAP, sepsis, respiratory failure and discharge to home with [facility]. The patient attended an office visit with Dr. Rhoda St yesterday, 3/19/18. Per chart VSS. The visit note is not yet complete. Potassium chloride 10 mEq daily was added and patient was advised to stop \"old\" potassium. Advised to RTC in about 4 weeks. Reached patient and identified self/role. Ms Steve Sandoval stated \"I feeling fine now. I saw Dr. Rhoda St yesterday. He said I didn't need any more antibiotics and I can do whatever I want. \"      Patient stated she thinks [facility] has called her daughter but a nurse has not been in the home yet. Patient also stated she does not believe she needs home care. Return call received from daughter, Michele Lamar, who stated [facility] never started and she also does not think the patient is in need of therapy or skilled nurse serves. Per daughter patient is doing well with no problems or concerns identified. Will follow. Goals Addressed             Most Recent       Post Hospitalization     Attends follow-up appointments as directed.    On track (3/20/2018)             Plan:  Monitor for attendance at apts  3/19/18-attended apt with Dr. Rhoda St

## 2018-03-30 ENCOUNTER — PATIENT OUTREACH (OUTPATIENT)
Dept: INTERNAL MEDICINE CLINIC | Age: 83
End: 2018-03-30

## 2018-03-30 NOTE — PROGRESS NOTES
Transitions of Care Coordination    Sonny Tavarez was hospitalized at [facility] 3/10-3/13/18 for CAP, sepsis, respiratory failure and discharged to home with [facility]. Reached patient's daughter, Dallas Bhagat (Vanessa acharya,) and identified self/role. Per daughter patient is doing well but is hoarse. Daughter stated patient is not coughing. Eating and sleeping well with no other complaints. No fever, chills, increased respiratory rate or confusion. Advised daughter to notify the office if hoarseness continues or gets works and daughter voiced understanding. Reviewed red flags for sepsis. Will follow. Goals Addressed             Most Recent       Post Hospitalization     Understands red flags post discharge.    On track (3/30/2018)             Plan:  Educate re s/s of sepsis and review at each contact  Signs     Confusion   Fast breathing   Chills or shaking   Fever or low body temperature   Fast heart beat   Lightheadedness   Less urine output   Skin rash or skin color changes

## 2018-04-13 RX ORDER — CLOTRIMAZOLE AND BETAMETHASONE DIPROPIONATE 10; .64 MG/G; MG/G
CREAM TOPICAL
Qty: 45 G | Refills: 2 | Status: SHIPPED | OUTPATIENT
Start: 2018-04-13 | End: 2019-07-12 | Stop reason: SDUPTHER

## 2018-04-17 ENCOUNTER — PATIENT OUTREACH (OUTPATIENT)
Dept: INTERNAL MEDICINE CLINIC | Age: 83
End: 2018-04-17

## 2018-04-17 NOTE — PROGRESS NOTES
Hospital Discharge Follow Up        Salomón Grove was hospitalized at [facility] 3/10-3/13/18 for CAP, sepsis, respiratory failure and discharged to home with [facility].     Voice messages left providing this writer's direct contact number for a return call.

## 2018-04-18 ENCOUNTER — PATIENT OUTREACH (OUTPATIENT)
Dept: INTERNAL MEDICINE CLINIC | Age: 83
End: 2018-04-18

## 2018-04-18 NOTE — PROGRESS NOTES
Hospital Discharge Follow Up    Ciarra Pepe was hospitalized at [facility] 3/10-3/13/18 for CAP, sepsis, respiratory failure and discharged to home with [facility]. Reached patient's daughter, Timothy Pencil, and identified self/role. Per daughter patient is doing well with no problems or concerns at this time. No fever, increased cough or SOB. Patient has graduated from the Transitions of Care Coordination  program on 4/18/18. Patient's symptoms are stable at this time. Patient/family has the ability to self-manage. Care management goals have been completed at this time. No further nurse navigator follow up scheduled. Pt has nurse navigator's contact information for any further questions, concerns, or needs. Patients upcoming visits:  Future Appointments  Date Time Provider Roverto Meza   4/24/2018 1:15 PM Fuentes Douglas  [address],  [address] [place]   6/4/2018 2:00 PM Fuentes Douglas MD [address] [facility]             Most Recent       Post Hospitalization     Attends follow-up appointments as directed. On track (4/18/2018)             Plan:  Monitor for attendance at apts  3/19/18-attended apt with Dr. Alexandru Mendoza Understands red flags post discharge.    On track (4/18/2018)             Plan:  Educate re s/s of sepsis and review at each contact  Signs     Confusion   Fast breathing   Chills or shaking   Fever or low body temperature   Fast heart beat   Lightheadedness   Less urine output   Skin rash or skin color changes

## 2018-04-24 ENCOUNTER — OFFICE VISIT (OUTPATIENT)
Dept: INTERNAL MEDICINE CLINIC | Age: 83
End: 2018-04-24

## 2018-04-24 VITALS
HEART RATE: 79 BPM | HEIGHT: 65 IN | OXYGEN SATURATION: 97 % | SYSTOLIC BLOOD PRESSURE: 126 MMHG | RESPIRATION RATE: 16 BRPM | TEMPERATURE: 97.6 F | DIASTOLIC BLOOD PRESSURE: 68 MMHG | WEIGHT: 145 LBS | BODY MASS INDEX: 24.16 KG/M2

## 2018-04-24 DIAGNOSIS — J84.9 ILD (INTERSTITIAL LUNG DISEASE) (HCC): Primary | ICD-10-CM

## 2018-04-24 DIAGNOSIS — C90.01 MULTIPLE MYELOMA IN REMISSION (HCC): ICD-10-CM

## 2018-04-24 RX ORDER — DEXAMETHASONE 4 MG/1
TABLET ORAL
COMMUNITY
Start: 2018-04-16 | End: 2018-04-25 | Stop reason: ALTCHOICE

## 2018-04-24 NOTE — MR AVS SNAPSHOT
[address] [facility] [address] 
 
 
 [address] [facility], [address] [facility] [address] 
[phone] Patient: Jeanne Salas MRN: [id number] IVO:[id number] Visit Information Date & Time Provider Department Dept. Phone Encounter #  
 4/24/2018  1:15 PM Thais Pink MD [facility] INTERNAL MEDICINE OF [place] [phone] [id number] Follow-up Instructions Return in about 6 months (around 10/24/2018). Your Appointments 6/4/2018  2:00 PM  
Follow Up with Thais Pink MD  
[address] [place] [address] [address] [facility]) Appt Note: 4MO  
 [address] [place] [address], [address]  Vanessa Rosaek  
[phone]  
  
   
 [address] [place] [address], [address] [facility] [address]  
  
    
 10/16/2018 12:45 PM  
Follow Up with Thais Pink MD  
[facility] INTERNAL MEDICINE OF [place] [address] [facility]) Appt Note: 6mo  
 [address] [place] [address], [address] [facility] [address] [phone] Upcoming Health Maintenance Date Due DTaP/Tdap/Td series (1 - Tdap) 7/25/1989 Bone Densitometry (Dexa) Screening 7/23/1996 Pneumococcal 65+ High/Highest Risk (2 of 2 - PCV13) 9/19/2012 MEDICARE YEARLY EXAM 3/14/2018 BREAST CANCER SCRN MAMMOGRAM 8/24/2018 GLAUCOMA SCREENING Q2Y 3/22/2019 Allergies as of 4/24/2018  Review Complete On: 4/24/2018 By: Hannah Richards, LPN Severity Noted Reaction Type Reactions Penicillin V Potassium    Diarrhea States not allergic Shellfish Derived  04/21/2016    Swelling Current Immunizations  Reviewed on 4/24/2018 Name Date Influenza High Dose Vaccine PF 12/12/2016 Influenza Vaccine 11/24/2017 Influenza Vaccine (Quad) PF 9/25/2015  1:00 PM  
 Pneumococcal Polysaccharide (PPSV-23) 9/19/2011, 10/20/1993 Td 7/24/1989 Zoster Vaccine, Live 2/8/2008  Reviewed by Thais Pink MD on 4/24/2018 at  2:33 PM  
 Reviewed by Thais Pink MD on 4/24/2018 at  2:33 PM  
 Reviewed by Lucy Person MD on 4/24/2018 at  2:33 PM  
Vitals BP Pulse Temp Resp Height(growth percentile) 126/68 (BP 1 Location: Left arm, BP Patient Position: Sitting) 79 97.6 °F (36.4 °C) (Tympanic) 16 5' 5\" (1.651 m) Weight(growth percentile) SpO2 BMI OB Status Smoking Status 145 lb (65.8 kg) 97% 24.13 kg/m2 Hysterectomy Never Smoker BMI and BSA Data Body Mass Index Body Surface Area  
 24.13 kg/m 2 1.74 m 2 Preferred Pharmacy Pharmacy Name Phone [facility] [address], [address] [facility] [phone] Your Updated Medication List  
  
   
This list is accurate as of 4/24/18  2:39 PM.  Always use your most recent med list.  
  
  
  
  
 acyclovir 400 mg tablet Commonly known as:  ZOVIRAX Take 400 mg by mouth two (2) times a day. aspirin delayed-release 81 mg tablet Take  by mouth daily. clotrimazole-betamethasone topical cream  
Commonly known as:  LOTRISONE  
APPLY TO AFFECTED AREA TWICE PER DAY  
  
 cyanocobalamin 1,000 mcg tablet Take 1,000 mcg by mouth daily. dexamethasone 4 mg tablet Commonly known as:  DECADRON  
  
 diazePAM 5 mg tablet Commonly known as:  VALIUM Take 5 mg by mouth every six (6) hours as needed for Anxiety. hydroCHLOROthiazide 12.5 mg capsule Commonly known as:  Mennie Matter TAKE ONE CAPSULE BY MOUTH ONCE DAILY IN THE MORNING  
  
 potassium chloride 10 mEq tablet Commonly known as:  KLOR-CON  
1 tab twice per day (stop \"old\" potassium  
  
 sertraline 50 mg tablet Commonly known as:  ZOLOFT  
TAKE ONE TABLET BY MOUTH ONCE DAILY  
  
 tiotropium bromide 1.25 mcg/actuation inhaler Commonly known as:  Bettey Glass Take 2 Puffs by inhalation daily. TYLENOL 325 mg tablet Generic drug:  acetaminophen Take  by mouth every four (4) hours as needed for Pain. VELCADE INJECTION  
by Injection route. ZyrTEC 10 mg tablet Generic drug:  cetirizine Take  by mouth. Follow-up Instructions Return in about 6 months (around 10/24/2018). Introducing [facility] & [facility] SERVICES! Dear Lula Calderon: Thank you for requesting a [employer] account. Our records indicate that you have previously registered for a [employer] account but its currently inactive. Please call our [employer] support line at [phone]. Additional Information If you have questions, please visit the Frequently Asked Questions section of the Node[employer] website at [email or web address] Remember, Nodejitsu is NOT to be used for urgent needs. For medical emergencies, dial 911. Now available from your iPhone and Android! Please provide this summary of care documentation to your next provider. Your primary care clinician is listed as Radha Gaspar. If you have any questions after today's visit, please call [phone].

## 2018-04-24 NOTE — PROGRESS NOTES
Chief Complaint   Patient presents with    Transitions Of Care     follow up     . Is someone accompanying this pt? no    Is the patient using any DME equipment during [address]? no    Depression Screening:  PHQ over the last two weeks 10/20/2016 10/20/2016 9/25/2015   Little interest or pleasure in doing things More than half the days Not at all Several days   Feeling down, depressed or hopeless More than half the days More than half the days Several days   Total Score PHQ 2 4 2 2   Trouble falling or staying asleep, or sleeping too much Nearly every day - -   Feeling tired or having little energy Nearly every day - -   Poor appetite or overeating Not at all - -   Feeling bad about yourself - or that you are a failure or have let yourself or your family down Not at all - -   Trouble concentrating on things such as school, work, reading or watching TV Not at all - -   Moving or speaking so slowly that other people could have noticed; or the opposite being so fidgety that others notice Several days - -   Thoughts of being better off dead, or hurting yourself in some way Not at all - -   PHQ 9 Score 11 - -   How difficult have these problems made it for you to do your work, take care of your home and get along with others Not difficult at all - -       Learning Assessment:  Learning Assessment 1/8/2016   PRIMARY LEARNER Patient   HIGHEST LEVEL OF EDUCATION - PRIMARY LEARNER  GRADUATED HIGH SCHOOL OR GED   BARRIERS PRIMARY LEARNER NONE   CO-LEARNER CAREGIVER No   PRIMARY LANGUAGE ENGLISH    NEED No   LEARNER PREFERENCE PRIMARY VIDEOS     DEMONSTRATION     READING     PICTURES   ANSWERED BY patient   RELATIONSHIP SELF       Abuse Screening:  No flowsheet data found. Fall Risk  Fall Risk Assessment, last 12 mths 12/5/2017 10/20/2016 9/25/2015   Able to walk? Yes Yes Yes   Fall in past 12 months? No - No         Elizabeth [place] is not updated on all         Advance Directive:  1.  Do you have an advance directive in place? Patient Reply:yes        Coordination of Care:  1. Have you been to the ER, urgent care clinic since your last visit? Hospitalized since your last visit? no    2. Have you seen or consulted any other health care providers outside of the [address] [place] [address] since your last visit? Include any pap smears or colon screening.  no

## 2018-04-24 NOTE — PROGRESS NOTES
This is the Subsequent Medicare Annual Wellness Exam, performed 12 months or more after the Initial AWV or the last Subsequent AWV    I have reviewed the patient's medical history in detail and updated the computerized patient record. History   The patient has interstitial lung disease. She is now recovered from a pneumonia. She is doing much better. She denies dyspnea. She has a slight cough. The patient remain on medications for multiple myeloma. She has been doing well on the treatments but she had a local reaction at the site of the last injection      Past Medical History:   Diagnosis Date    Acute bronchitis     Acute upper respiratory infections of unspecified site     Cough     Essential hypertension, benign     Meniere's disease, unspecified     Muscle pain     Pain in limb       Past Surgical History:   Procedure Laterality Date    HX HYSTERECTOMY      HX MOHS PROCEDURES Left      Current Outpatient Prescriptions   Medication Sig Dispense Refill    clotrimazole-betamethasone (LOTRISONE) topical cream APPLY TO AFFECTED AREA TWICE PER DAY 45 g 2    hydroCHLOROthiazide (MICROZIDE) 12.5 mg capsule TAKE ONE CAPSULE BY MOUTH ONCE DAILY IN THE MORNING 90 Cap 0    aspirin delayed-release 81 mg tablet Take  by mouth daily.  BORTEZOMIB (VELCADE INJECTION) by Injection route.  acyclovir (ZOVIRAX) 400 mg tablet Take 400 mg by mouth two (2) times a day.  tiotropium bromide (SPIRIVA RESPIMAT) 1.25 mcg/actuation inhaler Take 2 Puffs by inhalation daily. 3 Inhaler 3    acetaminophen (TYLENOL) 325 mg tablet Take  by mouth every four (4) hours as needed for Pain.  sertraline (ZOLOFT) 50 mg tablet TAKE ONE TABLET BY MOUTH ONCE DAILY 30 Tab 0    cyanocobalamin 1,000 mcg tablet Take 1,000 mcg by mouth daily.  diazepam (VALIUM) 5 mg tablet Take 5 mg by mouth every six (6) hours as needed for Anxiety.  cetirizine (ZYRTEC) 10 mg tablet Take  by mouth.        Allergies   Allergen Reactions    Penicillin V Potassium Diarrhea     States not allergic    Shellfish Derived Swelling     Family History   Problem Relation Age of Onset    Cancer Father      LUNG     Social History   Substance Use Topics    Smoking status: Never Smoker    Smokeless tobacco: Never Used    Alcohol use No     Patient Active Problem List   Diagnosis Code    Pain in limb M79.609    Essential hypertension, benign I10    Meniere's disease, unspecified H81.09    Spinal stenosis of lumbar region M48.061    Unresolved grief F43.21    ILD (interstitial lung disease) ([facility] [address] J84.9    Syphilis, latent A53.0    Multiple myeloma in remission ([facility] C90.01       Depression Risk Factor Screening:     PHQ over the last two weeks 10/20/2016   Little interest or pleasure in doing things More than half the days   Feeling down, depressed or hopeless More than half the days   Total Score PHQ 2 4   Trouble falling or staying asleep, or sleeping too much Nearly every day   Feeling tired or having little energy Nearly every day   Poor appetite or overeating Not at all   Feeling bad about yourself - or that you are a failure or have let yourself or your family down Not at all   Trouble concentrating on things such as school, work, reading or watching TV Not at all   Moving or speaking so slowly that other people could have noticed; or the opposite being so fidgety that others notice Several days   Thoughts of being better off dead, or hurting yourself in some way Not at all   PHQ 9 Score 11   How difficult have these problems made it for you to do your work, take care of your home and get along with others Not difficult at all     Alcohol Risk Factor Screening: You do not drink alcohol or very rarely. Functional Ability and Level of Safety:   Hearing Loss  Hearing is good. The patient wears hearing aids. Activities of Daily Living  The home contains: no safety equipment.   Patient does total self care    Fall Risk  Fall Risk Assessment, last 12 mths 12/5/2017   Able to walk? Yes   Fall in past 12 months? No       Abuse Screen  Patient is not abused    Cognitive Screening   Evaluation of Cognitive Function:  Has your family/caregiver stated any concerns about your memory: no  Normal    Patient Care Team   Patient Care Team:  Jade Zaragoza MD as PCP - General (Internal Medicine)  Angelito Saez MD (Pulmonary Disease)  Reina Peirre, RN as Ambulatory Care Navigator    Assessment/Plan   Education and counseling provided:  Are appropriate based on today's review and evaluation    Diagnoses and all orders for this visit:    1. ILD (interstitial lung disease) ([facility] [address]    2. Multiple myeloma in remission [facility])      Health Maintenance Due   Topic Date Due    DTaP/Tdap/Td series (1 - Tdap) 07/25/1989    Bone Densitometry (Dexa) Screening  07/23/1996    Pneumococcal 65+ High/Highest Risk (2 of 2 - PCV13) 09/19/2012       Discussed Prevnar 13 and Shingrix - I advised the patient to check with her hematologist regarding the appropriateness of getting these vaccines  she was advised to continue her maintenance medications      Follow-up Disposition:  Return in about 6 months (around 10/24/2018). I asked Ava Huizar. Ondina Alberts if she has any questions and I answered the questions. Best Alberts states that she understands the treatment plan and agrees with the treatment plan

## 2018-04-25 PROBLEM — J47.9 BRONCHIECTASIS WITHOUT COMPLICATION (HCC): Status: RESOLVED | Noted: 2017-02-02 | Resolved: 2018-04-25

## 2018-05-22 RX ORDER — HYDROCHLOROTHIAZIDE 12.5 MG/1
CAPSULE ORAL
Qty: 90 CAP | Refills: 0 | Status: SHIPPED | OUTPATIENT
Start: 2018-05-22 | End: 2018-08-25 | Stop reason: SDUPTHER

## 2018-06-15 ENCOUNTER — HOSPITAL ENCOUNTER (OUTPATIENT)
Dept: GENERAL RADIOLOGY | Age: 83
Discharge: HOME | End: 2018-06-15
Payer: MEDICARE

## 2018-06-15 DIAGNOSIS — C90.00 MULTIPLE MYELOMA (HCC): ICD-10-CM

## 2018-06-15 PROCEDURE — 72100 X-RAY EXAM L-S SPINE 2/3 VWS: CPT

## 2018-08-06 ENCOUNTER — OFFICE VISIT (OUTPATIENT)
Dept: INTERNAL MEDICINE CLINIC | Age: 83
End: 2018-08-06

## 2018-08-06 VITALS
TEMPERATURE: 98.3 F | BODY MASS INDEX: 24.66 KG/M2 | SYSTOLIC BLOOD PRESSURE: 138 MMHG | DIASTOLIC BLOOD PRESSURE: 62 MMHG | HEIGHT: 65 IN | WEIGHT: 148 LBS | OXYGEN SATURATION: 96 % | RESPIRATION RATE: 18 BRPM | HEART RATE: 83 BPM

## 2018-08-06 DIAGNOSIS — S00.83XA CONTUSION OF FACE, INITIAL ENCOUNTER: ICD-10-CM

## 2018-08-06 DIAGNOSIS — I10 ESSENTIAL HYPERTENSION, BENIGN: ICD-10-CM

## 2018-08-06 DIAGNOSIS — S60.511A ABRASION OF RIGHT HAND, INITIAL ENCOUNTER: ICD-10-CM
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
## 2018-08-06 RX ORDER — DOXYCYCLINE 100 MG/1
100 CAPSULE ORAL 2 TIMES DAILY
Qty: 14 CAP | Refills: 0 | Status: SHIPPED | OUTPATIENT
Start: 2018-08-06 | End: 2018-08-13

## 2018-08-06 NOTE — PROGRESS NOTES
Misti Deras is a 80 y.o. female presenting today for Fall  . HPI:  Misti Deras presents to the office today for fall. Patient reports she fell on 7/31/20218 fell at the end of the driveway and was walking on gravel. She notes the rocks are uneven and believes she walked on the rocks the wrong way. She notes she was not dizzy prior to the fall. She attempted to brace the fall with her hands and hit her head. She was seen at [facility] ED and she had a CT scan. .  Patient presents today with severe right-side facial bruising and bruising on the bridge of the nose and underneath the left eye. She also has a abrasion to the lateral palm of the right hand that is draining yellow-color drainage. She denies any headache or dizziness today. Review of Systems   Respiratory: Negative for cough. Cardiovascular: Negative for chest pain and palpitations. Neurological: Negative for dizziness and headaches. Allergies   Allergen Reactions    Penicillin V Potassium Diarrhea     States not allergic    Shellfish Derived Swelling       Current Outpatient Prescriptions   Medication Sig Dispense Refill    doxycycline (VIBRAMYCIN) 100 mg capsule Take 1 Cap by mouth two (2) times a day for 7 days. 14 Cap 0    hydroCHLOROthiazide (MICROZIDE) 12.5 mg capsule TAKE 1 CAPSULE BY MOUTH ONCE DAILY IN THE MORNING 90 Cap 0    clotrimazole-betamethasone (LOTRISONE) topical cream APPLY TO AFFECTED AREA TWICE PER DAY 45 g 2    aspirin delayed-release 81 mg tablet Take  by mouth daily.  BORTEZOMIB (VELCADE INJECTION) by Injection route.  tiotropium bromide (SPIRIVA RESPIMAT) 1.25 mcg/actuation inhaler Take 2 Puffs by inhalation daily. 3 Inhaler 3    acetaminophen (TYLENOL) 325 mg tablet Take  by mouth every four (4) hours as needed for Pain.  sertraline (ZOLOFT) 50 mg tablet TAKE ONE TABLET BY MOUTH ONCE DAILY 30 Tab 0    cyanocobalamin 1,000 mcg tablet Take 1,000 mcg by mouth daily.       diazepam (VALIUM) 5 mg tablet Take 5 mg by mouth every six (6) hours as needed for Anxiety.  cetirizine (ZYRTEC) 10 mg tablet Take  by mouth.  acyclovir (ZOVIRAX) 400 mg tablet Take 400 mg by mouth two (2) times a day. Past Medical History:   Diagnosis Date    Acute bronchitis     Acute upper respiratory infections of unspecified site     Cough     Essential hypertension, benign     Meniere's disease, unspecified     Muscle pain     Pain in limb        Past Surgical History:   Procedure Laterality Date    HX HYSTERECTOMY      HX MOHS PROCEDURES Left        Social History     Social History    Marital status:      Spouse name: N/A    Number of children: N/A    Years of education: N/A     Occupational History    Not on file. Social History Main Topics    Smoking status: Never Smoker    Smokeless tobacco: Never Used    Alcohol use No    Drug use: No    Sexual activity: Not Currently     Other Topics Concern    Not on file     Social History Narrative       Patient does not have an advanced directive on file    Vitals:    08/06/18 1204   BP: 138/62   Pulse: 83   Resp: 18   Temp: 98.3 °F (36.8 °C)   TempSrc: Tympanic   SpO2: 96%   Weight: 148 lb (67.1 kg)   Height: 5' 5\" (1.651 m)   PainSc:   2   PainLoc: Hand       Physical Exam   Cardiovascular: Normal rate, regular rhythm and normal heart sounds. Pulmonary/Chest: Effort normal and breath sounds normal.   Musculoskeletal:        Hands:  Neurological: She is alert. Skin:        Nursing note and vitals reviewed. No visits with results within 3 Month(s) from this visit.   Latest known visit with results is:    Hospital Outpatient Visit on 08/07/2017   Component Date Value Ref Range Status    RPR 08/07/2017 REACTIVE* NR   Final    T. pallidum Ab (FTA-Ab) 08/07/2017 Reactive* Non Reactive   Final    Comment: (NOTE)  Performed At: [address] [id number]  Rigo Barros MD [id number]      RPR titer 08/07/2017 1:4   Final       .No results found for any visits on 08/06/18. Assessment / Plan:      ICD-10-CM ICD-9-CM    1. Fall, initial encounter [address] XXXA E888.9    2. Essential hypertension, benign I10 401.1    3. Contusion of face, initial encounter S00.83XA 920    4. Abrasion of right hand, initial encounter S60.511A 914.0 doxycycline (VIBRAMYCIN) 100 mg capsule     Fall  HTN controlled  Abrasion to right hand- Doxycyline rx  F/u if no improvement     Follow-up Disposition:  Return if symptoms worsen or fail to improve. I asked the patient if she  had any questions and answered her  questions.   The patient stated that she understands the treatment plan and agrees with the treatment plan 36.7

## 2018-08-16 ENCOUNTER — OFFICE VISIT (OUTPATIENT)
Dept: INTERNAL MEDICINE CLINIC | Age: 83
End: 2018-08-16

## 2018-08-16 VITALS
BODY MASS INDEX: 24.83 KG/M2 | DIASTOLIC BLOOD PRESSURE: 52 MMHG | SYSTOLIC BLOOD PRESSURE: 122 MMHG | RESPIRATION RATE: 18 BRPM | HEIGHT: 65 IN | TEMPERATURE: 98.6 F | OXYGEN SATURATION: 93 % | WEIGHT: 149 LBS | HEART RATE: 95 BPM

## 2018-08-16 DIAGNOSIS — S81.811A LACERATION OF RIGHT LOWER EXTREMITY, INITIAL ENCOUNTER: Primary | ICD-10-CM

## 2018-08-16 RX ORDER — DOXYCYCLINE 100 MG/1
100 CAPSULE ORAL 2 TIMES DAILY
Qty: 20 CAP | Refills: 0 | Status: SHIPPED | OUTPATIENT
Start: 2018-08-16 | End: 2018-08-26

## 2018-08-16 NOTE — PROGRESS NOTES
Vinita Arce is a 80 y.o. female presenting today for Leg Injury (right)  . HPI:  Vinita Arce presents to the office today for right shin region injury x 3 days ago. Patient notes she was in her flower bed and the \"spade\" hit her leg. She has a 3 cm laceration to her right shin region. She denies any fever but does have erythema and yellow-tinge drainage. Review of Systems   Respiratory: Negative for cough. Cardiovascular: Negative for chest pain and palpitations. Allergies   Allergen Reactions    Penicillin V Potassium Diarrhea     States not allergic    Shellfish Derived Swelling       Current Outpatient Prescriptions   Medication Sig Dispense Refill    doxycycline (VIBRAMYCIN) 100 mg capsule Take 1 Cap by mouth two (2) times a day for 10 days. 20 Cap 0    hydroCHLOROthiazide (MICROZIDE) 12.5 mg capsule TAKE 1 CAPSULE BY MOUTH ONCE DAILY IN THE MORNING 90 Cap 0    clotrimazole-betamethasone (LOTRISONE) topical cream APPLY TO AFFECTED AREA TWICE PER DAY 45 g 2    aspirin delayed-release 81 mg tablet Take  by mouth daily.  BORTEZOMIB (VELCADE INJECTION) by Injection route.  tiotropium bromide (SPIRIVA RESPIMAT) 1.25 mcg/actuation inhaler Take 2 Puffs by inhalation daily. 3 Inhaler 3    acetaminophen (TYLENOL) 325 mg tablet Take  by mouth every four (4) hours as needed for Pain.  sertraline (ZOLOFT) 50 mg tablet TAKE ONE TABLET BY MOUTH ONCE DAILY 30 Tab 0    cyanocobalamin 1,000 mcg tablet Take 1,000 mcg by mouth daily.  diazepam (VALIUM) 5 mg tablet Take 5 mg by mouth every six (6) hours as needed for Anxiety.  cetirizine (ZYRTEC) 10 mg tablet Take  by mouth.  acyclovir (ZOVIRAX) 400 mg tablet Take 400 mg by mouth two (2) times a day.          Past Medical History:   Diagnosis Date    Acute bronchitis     Acute upper respiratory infections of unspecified site     Cough     Essential hypertension, benign     Meniere's disease, unspecified  Muscle pain     Pain in limb        Past Surgical History:   Procedure Laterality Date    HX HYSTERECTOMY      HX MOHS PROCEDURES Left        Social History     Social History    Marital status:      Spouse name: N/A    Number of children: N/A    Years of education: N/A     Occupational History    Not on file. Social History Main Topics    Smoking status: Never Smoker    Smokeless tobacco: Never Used    Alcohol use No    Drug use: No    Sexual activity: Not Currently     Other Topics Concern    Not on file     Social History Narrative       Patient does have an advanced directive on file    Vitals:    08/16/18 1521   BP: 122/52   Pulse: 95   Resp: 18   Temp: 98.6 °F (37 °C)   TempSrc: Tympanic   SpO2: 93%   Weight: 149 lb (67.6 kg)   Height: 5' 5\" (1.651 m)   PainSc:   2   PainLoc: Leg       Physical Exam   Constitutional: No distress. Cardiovascular: Normal rate, regular rhythm and normal heart sounds. Pulmonary/Chest: Effort normal and breath sounds normal.   Neurological: She is alert. Gait normal.   Skin: There is erythema. Nursing note and vitals reviewed. No visits with results within 3 Month(s) from this visit. Latest known visit with results is:    Hospital Outpatient Visit on 08/07/2017   Component Date Value Ref Range Status    RPR 08/07/2017 REACTIVE* NR   Final    T. pallidum Ab (FTA-Ab) 08/07/2017 Reactive* Non Reactive   Final    Comment: (NOTE)  Performed At: [address] [id number]  Danish Nicholson MD [id number]      RPR titer 08/07/2017 1:4   Final       .No results found for any visits on 08/16/18. Assessment / Plan:      ICD-10-CM ICD-9-CM    1. Laceration of right lower extremity, initial encounter S81.811A 894.0 doxycycline (VIBRAMYCIN) 100 mg capsule      TETANUS AND DIPHTHERIA TOXOIDS (TD) ADSORBED, PRES.  FREE, IN INDIVIDS. >=7, IM     Laceration RLE  Doxycycline rx  Tetanus   F/u prn or if no improvement      Follow-up Disposition:  Return if symptoms worsen or fail to improve. I asked the patient if she  had any questions and answered her  questions.   The patient stated that she understands the treatment plan and agrees with the treatment plan

## 2018-08-26 RX ORDER — HYDROCHLOROTHIAZIDE 12.5 MG/1
CAPSULE ORAL
Qty: 90 CAP | Refills: 0 | Status: SHIPPED | OUTPATIENT
Start: 2018-08-26 | End: 2018-12-04 | Stop reason: SDUPTHER

## 2018-09-20 ENCOUNTER — HOSPITAL ENCOUNTER (OUTPATIENT)
Dept: GENERAL RADIOLOGY | Age: 83
Discharge: HOME | End: 2018-09-20
Payer: MEDICARE

## 2018-09-20 ENCOUNTER — OFFICE VISIT (OUTPATIENT)
Dept: INTERNAL MEDICINE CLINIC | Age: 83
End: 2018-09-20

## 2018-09-20 VITALS
OXYGEN SATURATION: 95 % | TEMPERATURE: 99.3 F | HEART RATE: 85 BPM | SYSTOLIC BLOOD PRESSURE: 146 MMHG | DIASTOLIC BLOOD PRESSURE: 74 MMHG | RESPIRATION RATE: 22 BRPM | HEIGHT: 65 IN

## 2018-09-20 DIAGNOSIS — J22 LOWER RESPIRATORY INFECTION: ICD-10-CM

## 2018-09-20 DIAGNOSIS — J22 LOWER RESPIRATORY INFECTION: Primary | ICD-10-CM

## 2018-09-20 DIAGNOSIS — I10 ESSENTIAL HYPERTENSION, BENIGN: ICD-10-CM

## 2018-09-20 PROCEDURE — 71046 X-RAY EXAM CHEST 2 VIEWS: CPT

## 2018-09-20 RX ORDER — DEXAMETHASONE 4 MG/1
TABLET ORAL
COMMUNITY
Start: 2018-09-10 | End: 2019-02-22 | Stop reason: SDUPTHER

## 2018-09-20 RX ORDER — AZITHROMYCIN 250 MG/1
TABLET, FILM COATED ORAL
Qty: 6 TAB | Refills: 0 | Status: SHIPPED | OUTPATIENT
Start: 2018-09-20 | End: 2019-02-25 | Stop reason: ALTCHOICE

## 2018-09-20 NOTE — PROGRESS NOTES
Vinita Arce is a 80 y.o. female presenting today for Nasal Congestion (started tuesday ) and Cough  . HPI:  Vinita Arce presents to the office today for nasal congestion, productive cough, headache and sore throat x 1 week. . She is positive for fever. She denies any dyspnea. Review of Systems   Constitutional: Positive for chills and fever (low grade). HENT: Positive for congestion and sore throat. Respiratory: Positive for cough and sputum production. Negative for shortness of breath. Cardiovascular: Negative for chest pain and palpitations. Neurological: Positive for headaches. Allergies   Allergen Reactions    Penicillin V Potassium Diarrhea     States not allergic    Shellfish Derived Swelling       Current Outpatient Prescriptions   Medication Sig Dispense Refill    dexamethasone (DECADRON) 4 mg tablet       azithromycin (ZITHROMAX) 250 mg tablet Take 2 tablets today, then take 1 tablet daily 6 Tab 0    hydroCHLOROthiazide (MICROZIDE) 12.5 mg capsule TAKE 1 CAPSULE BY MOUTH ONCE DAILY IN THE MORNING 90 Cap 0    clotrimazole-betamethasone (LOTRISONE) topical cream APPLY TO AFFECTED AREA TWICE PER DAY 45 g 2    aspirin delayed-release 81 mg tablet Take  by mouth daily.  BORTEZOMIB (VELCADE INJECTION) by Injection route.  acyclovir (ZOVIRAX) 400 mg tablet Take 400 mg by mouth two (2) times a day.  tiotropium bromide (SPIRIVA RESPIMAT) 1.25 mcg/actuation inhaler Take 2 Puffs by inhalation daily. 3 Inhaler 3    acetaminophen (TYLENOL) 325 mg tablet Take  by mouth every four (4) hours as needed for Pain.  sertraline (ZOLOFT) 50 mg tablet TAKE ONE TABLET BY MOUTH ONCE DAILY 30 Tab 0    cyanocobalamin 1,000 mcg tablet Take 1,000 mcg by mouth daily.  diazepam (VALIUM) 5 mg tablet Take 5 mg by mouth every six (6) hours as needed for Anxiety.  cetirizine (ZYRTEC) 10 mg tablet Take  by mouth.          Past Medical History:   Diagnosis Date    Acute bronchitis     Acute upper respiratory infections of unspecified site     Cough     Essential hypertension, benign     Meniere's disease, unspecified     Muscle pain     Pain in limb        Past Surgical History:   Procedure Laterality Date    HX HYSTERECTOMY      HX MOHS PROCEDURES Left        Social History     Social History    Marital status:      Spouse name: N/A    Number of children: N/A    Years of education: N/A     Occupational History    Not on file. Social History Main Topics    Smoking status: Never Smoker    Smokeless tobacco: Never Used    Alcohol use No    Drug use: No    Sexual activity: Not Currently     Other Topics Concern    Not on file     Social History Narrative       Patient does have an advanced directive on file    Vitals:    09/20/18 1459   BP: 146/74   Pulse: 85   Resp: 22   Temp: 99.3 °F (37.4 °C)   TempSrc: Tympanic   SpO2: 95%   Height: 5' 5\" (1.651 m)   PainSc:   0 - No pain       Physical Exam   Cardiovascular: Normal rate and regular rhythm. Pulmonary/Chest: She has wheezes. She has rhonchi in the right upper field, the right middle field, the right lower field, the left upper field, the left middle field and the left lower field. Nursing note and vitals reviewed. No visits with results within 3 Month(s) from this visit.   Latest known visit with results is:    Hospital Outpatient Visit on 08/07/2017   Component Date Value Ref Range Status    RPR 08/07/2017 REACTIVE* NR   Final    T. pallidum Ab (FTA-Ab) 08/07/2017 Reactive* Non Reactive   Final    Comment: (NOTE)  Performed At: [address] [id number]  Guillermo Ross MD [id number]      RPR titer 08/07/2017 1:4   Final       .  Results for orders placed or performed during the hospital encounter of 09/20/18   XR CHEST PA LAT    Narrative    EXAMINATION: Chest 2 views    INDICATION: Productive cough    COMPARISON: 1/9/2018    FINDINGS: Frontal and lateral views of the chest obtained. No confluent  consolidation. Prominent pulmonary interstitium throughout. Mediastinal  silhouette normal in size. No evidence of pneumothorax. No acute osseous  findings. Impression    IMPRESSION:    1. Pulmonary interstitial thickening may be partly or completely related to  chronic interstitial lung disease as seen on prior CT. Component of acute  infiltrate difficult to exclude. No consolidation. Assessment / Plan:      ICD-10-CM ICD-9-CM    1. Lower respiratory infection J22 519.8 dexamethasone (DECADRON) 4 mg tablet      azithromycin (ZITHROMAX) 250 mg tablet      XR CHEST PA LAT   2. Essential hypertension, benign I10 401. 1      Chest xray ordered  LRI- Zithromas rx given  HTN- mildly elevated. No change to treatment plan      Follow-up Disposition:  Return if symptoms worsen or fail to improve. I asked the patient if she  had any questions and answered her  questions.   The patient stated that she understands the treatment plan and agrees with the treatment plan

## 2018-09-20 NOTE — PROGRESS NOTES
Chief Complaint   Patient presents with    Nasal Congestion     started tuesday     Cough       Depression Screening:  PHQ over the last two weeks 10/20/2016   Little interest or pleasure in doing things More than half the days   Feeling down, depressed, irritable, or hopeless More than half the days   Total Score PHQ 2 4   Trouble falling or staying asleep, or sleeping too much Nearly every day   Feeling tired or having little energy Nearly every day   Poor appetite, weight loss, or overeating Not at all   Feeling bad about yourself - or that you are a failure or have let yourself or your family down Not at all   Trouble concentrating on things such as school, work, reading, or watching TV Not at all   Moving or speaking so slowly that other people could have noticed; or the opposite being so fidgety that others notice Several days   Thoughts of being better off dead, or hurting yourself in some way Not at all   PHQ 9 Score 11   How difficult have these problems made it for you to do your work, take care of your home and get along with others Not difficult at all       Learning Assessment:  Learning Assessment 1/8/2016   PRIMARY LEARNER Patient   HIGHEST LEVEL OF EDUCATION - PRIMARY LEARNER  GRADUATED HIGH SCHOOL OR GED   BARRIERS PRIMARY LEARNER NONE   CO-LEARNER CAREGIVER No   PRIMARY LANGUAGE ENGLISH    NEED No   LEARNER PREFERENCE PRIMARY VIDEOS     DEMONSTRATION     READING     PICTURES   ANSWERED BY patient   RELATIONSHIP SELF       Abuse Screening:  No flowsheet data found. Fall Risk  Fall Risk Assessment, last 12 mths 8/6/2018   Able to walk? Yes   Fall in past 12 months? Yes   Fall with injury? Yes   Number of falls in past 12 months 1   Fall Risk Score 2         Advance Directive:  1. Do you have an advance directive in place? Patient Reply:on file         1. Have you been to the ER, urgent care clinic since your last visit? Hospitalized since your last visit?no    2.  Have you seen or consulted any other health care providers outside of the [address] [place] [address] since your last visit? Include any pap smears or colon screening.  no

## 2018-09-21 ENCOUNTER — TELEPHONE (OUTPATIENT)
Dept: INTERNAL MEDICINE CLINIC | Age: 83
End: 2018-09-21

## 2018-09-21 DIAGNOSIS — J84.9 CHRONIC INTERSTITIAL LUNG DISEASE (HCC): Primary | ICD-10-CM

## 2018-09-21 NOTE — TELEPHONE ENCOUNTER
Mark and informed Her of x-ray results per Melissa Chacko NP's request. Olivia Monaco expressed understanding. Continue with antibiotics and pulmonary referral will be placed for pulmonary to follow.

## 2018-10-02 ENCOUNTER — OFFICE VISIT (OUTPATIENT)
Dept: INTERNAL MEDICINE CLINIC | Age: 83
End: 2018-10-02

## 2018-10-02 VITALS
DIASTOLIC BLOOD PRESSURE: 60 MMHG | TEMPERATURE: 98.4 F | HEIGHT: 65 IN | OXYGEN SATURATION: 96 % | WEIGHT: 147 LBS | SYSTOLIC BLOOD PRESSURE: 160 MMHG | HEART RATE: 76 BPM | BODY MASS INDEX: 24.49 KG/M2

## 2018-10-02 DIAGNOSIS — C90.01 MULTIPLE MYELOMA IN REMISSION (HCC): ICD-10-CM

## 2018-10-02 DIAGNOSIS — J22 LOWER RESPIRATORY INFECTION: Primary | ICD-10-CM

## 2018-10-02 DIAGNOSIS — J84.9 CHRONIC INTERSTITIAL LUNG DISEASE (HCC): ICD-10-CM

## 2018-10-02 RX ORDER — PREDNISONE 20 MG/1
TABLET ORAL
Qty: 26 TAB | Refills: 0 | Status: SHIPPED | OUTPATIENT
Start: 2018-10-02 | End: 2019-05-08 | Stop reason: ALTCHOICE

## 2018-10-02 RX ORDER — CLINDAMYCIN HYDROCHLORIDE 300 MG/1
300 CAPSULE ORAL 3 TIMES DAILY
Qty: 30 CAP | Refills: 0 | Status: SHIPPED | OUTPATIENT
Start: 2018-10-02 | End: 2018-10-12

## 2018-10-02 NOTE — PROGRESS NOTES
Rucarlene Aguilar presents today for Chief Complaint Patient presents with  Well Woman  Cold Symptoms Sara Aguilar preferred language for health care discussion is english'. Is someone accompanying this pt? Yes daughter Is the patient using any DME equipment during OV? no 
 
Depression Screening: PHQ over the last two weeks 10/20/2016 Little interest or pleasure in doing things More than half the days Feeling down, depressed, irritable, or hopeless More than half the days Total Score PHQ 2 4 Trouble falling or staying asleep, or sleeping too much Nearly every day Feeling tired or having little energy Nearly every day Poor appetite, weight loss, or overeating Not at all Feeling bad about yourself - or that you are a failure or have let yourself or your family down Not at all Trouble concentrating on things such as school, work, reading, or watching TV Not at all Moving or speaking so slowly that other people could have noticed; or the opposite being so fidgety that others notice Several days Thoughts of being better off dead, or hurting yourself in some way Not at all PHQ 9 Score 11 How difficult have these problems made it for you to do your work, take care of your home and get along with others Not difficult at all Learning Assessment: 
Learning Assessment 1/8/2016 PRIMARY LEARNER Patient HIGHEST LEVEL OF EDUCATION - PRIMARY LEARNER  GRADUATED HIGH SCHOOL OR GED  
BARRIERS PRIMARY LEARNER NONE  
CO-LEARNER CAREGIVER No  
PRIMARY LANGUAGE ENGLISH  NEED No  
LEARNER PREFERENCE PRIMARY VIDEOS  
  DEMONSTRATION  
  READING  
  PICTURES  
ANSWERED BY patient RELATIONSHIP SELF Abuse Screening: No flowsheet data found. Fall Risk Fall Risk Assessment, last 12 mths 10/2/2018 Able to walk? Yes Fall in past 12 months? Yes Fall with injury? Yes  
Number of falls in past 12 months - Fall Risk Score -  
 
 
 Health Maintenance reviewed and discussed and ordered per Provider. Health Maintenance Due Topic Date Due  Shingrix Vaccine Age 50> (1 of 2) 07/23/1981  Bone Densitometry (Dexa) Screening  07/23/1996  Pneumococcal 65+ High/Highest Risk (2 of 2 - PCV13) 09/19/2012  Influenza Age 5 to Adult  08/01/2018  DTaP/Tdap/Td series (1 - Tdap) 08/17/2018  BREAST CANCER SCRN MAMMOGRAM  08/24/2018 Kyler Brooks is updated on all  Pt currently taking Antiplatelet therapy? Yes aspirn Coordination of Care: 1. Have you been to the ER, urgent care clinic since your last visit? Yes [facility] fall Hospitalized since your last visit? no 
 
2. Have you seen or consulted any other health care providers outside of the [address] [place] [address] since your last visit? yes Include any pap smears or colon screening.  no

## 2018-10-07 NOTE — PROGRESS NOTES
The patient presents to the office today with the chief complaint of cough HPI The patient has chronic interstitial pneumonitis. She has a chronic low grade cough and dyspnea. She now has 3 days of marked increase in cough and dyspnea. The cough is productive of a small amount of yellowish sputum. The patient remains on chemotherapy for multiple myeloma that remains in remission. Review of Systems Respiratory: Positive for cough and shortness of breath. Cardiovascular: Negative for chest pain and leg swelling. Allergies Allergen Reactions  Penicillin V Potassium Diarrhea States not allergic  Shellfish Derived Swelling Current Outpatient Prescriptions Medication Sig Dispense Refill  tiotropium bromide (SPIRIVA RESPIMAT) 1.25 mcg/actuation inhaler Take 2 Puffs by inhalation daily. 3 Inhaler 3  predniSONE (DELTASONE) 20 mg tablet 4 tab daily x 2 days, 3 tab daily x 2 days, 2 tab daily x 4 days, 1 tab daily x 4 days 26 Tab 0  
 clindamycin (CLEOCIN) 300 mg capsule Take 1 Cap by mouth three (3) times daily for 10 days. 30 Cap 0  
 dexamethasone (DECADRON) 4 mg tablet  hydroCHLOROthiazide (MICROZIDE) 12.5 mg capsule TAKE 1 CAPSULE BY MOUTH ONCE DAILY IN THE MORNING 90 Cap 0  clotrimazole-betamethasone (LOTRISONE) topical cream APPLY TO AFFECTED AREA TWICE PER DAY 45 g 2  
 aspirin delayed-release 81 mg tablet Take  by mouth daily.  BORTEZOMIB (VELCADE INJECTION) by Injection route.  acyclovir (ZOVIRAX) 400 mg tablet Take 400 mg by mouth two (2) times a day.  acetaminophen (TYLENOL) 325 mg tablet Take  by mouth every four (4) hours as needed for Pain.  sertraline (ZOLOFT) 50 mg tablet TAKE ONE TABLET BY MOUTH ONCE DAILY 30 Tab 0  
 cyanocobalamin 1,000 mcg tablet Take 1,000 mcg by mouth daily.  diazepam (VALIUM) 5 mg tablet Take 5 mg by mouth every six (6) hours as needed for Anxiety.  cetirizine (ZYRTEC) 10 mg tablet Take  by mouth.  azithromycin (ZITHROMAX) 250 mg tablet Take 2 tablets today, then take 1 tablet daily 6 Tab 0 Past Medical History:  
Diagnosis Date  Acute bronchitis  Acute upper respiratory infections of unspecified site  Cough  Essential hypertension, benign  Meniere's disease, unspecified  Muscle pain  Pain in limb Past Surgical History:  
Procedure Laterality Date  HX HYSTERECTOMY  HX MOHS PROCEDURES Left Social History Social History  Marital status:  Spouse name: N/A  
 Number of children: N/A  
 Years of education: N/A Occupational History  Not on file. Social History Main Topics  Smoking status: Never Smoker  Smokeless tobacco: Never Used  Alcohol use No  
 Drug use: No  
 Sexual activity: Not Currently Other Topics Concern  Not on file Social History Narrative Patient does have an advanced directive on file Visit Vitals  /60 (BP 1 Location: Left arm, BP Patient Position: Sitting)  Pulse 76  Temp 98.4 °F (36.9 °C) (Tympanic)  Ht 5' 5\" (1.651 m)  Wt 147 lb (66.7 kg)  SpO2 96%  BMI 24.46 kg/m2 Physical Exam 
 
BMI:  OK No visits with results within 3 Month(s) from this visit. Latest known visit with results is: 
 
Hospital Outpatient Visit on 08/07/2017 Component Date Value Ref Range Status  RPR 08/07/2017 REACTIVE* NR   Final  
 T. pallidum Ab (FTA-Ab) 08/07/2017 Reactive* Non Reactive   Final  
 Comment: (NOTE) Performed At: [address] [id number] Suleiman Brock MD [id number]  RPR titer 08/07/2017 1:4   Final  
 
 
.No results found for any visits on 10/02/18. Assessment / Plan ICD-10-CM ICD-9-CM 1. Lower respiratory infection J22 519.8 2. Chronic interstitial lung disease ([address] J84.9 515 3. Multiple myeloma in remission (HCC) C90.01 203.01 Clindamycin Prednisone 
she was advised to continue her maintenance medications 
she is to call if symptoms persist over five days Follow-up Disposition: 
Return in about 3 months (around 1/2/2019). I asked Marianela Chen if she has any questions and I answered the questions. Paula Chen states that she understands the treatment plan and agrees with the treatment plan

## 2018-10-08 ENCOUNTER — OFFICE VISIT (OUTPATIENT)
Dept: INTERNAL MEDICINE CLINIC | Age: 83
End: 2018-10-08

## 2018-10-08 VITALS
TEMPERATURE: 98.4 F | OXYGEN SATURATION: 98 % | DIASTOLIC BLOOD PRESSURE: 68 MMHG | HEIGHT: 65 IN | BODY MASS INDEX: 24.49 KG/M2 | WEIGHT: 147 LBS | HEART RATE: 73 BPM | SYSTOLIC BLOOD PRESSURE: 142 MMHG

## 2018-10-08 DIAGNOSIS — R42 DIZZINESS: Primary | ICD-10-CM

## 2018-10-08 DIAGNOSIS — J84.9 CHRONIC INTERSTITIAL LUNG DISEASE (HCC): ICD-10-CM

## 2018-10-08 DIAGNOSIS — I10 ESSENTIAL HYPERTENSION, BENIGN: ICD-10-CM

## 2018-10-08 RX ORDER — SULFAMETHOXAZOLE AND TRIMETHOPRIM 800; 160 MG/1; MG/1
TABLET ORAL
Qty: 10 TAB | Refills: 0 | Status: SHIPPED | OUTPATIENT
Start: 2018-10-08 | End: 2019-02-25 | Stop reason: ALTCHOICE

## 2018-10-08 RX ORDER — MECLIZINE HYDROCHLORIDE 25 MG/1
TABLET ORAL
Qty: 30 TAB | Refills: 0 | Status: SHIPPED | OUTPATIENT
Start: 2018-10-08 | End: 2019-02-22 | Stop reason: SDUPTHER

## 2018-10-08 NOTE — PROGRESS NOTES
Chief Complaint   Patient presents with    Dizziness     at all times last 2 days        Depression Screening:  PHQ over the last two weeks 10/20/2016   Little interest or pleasure in doing things More than half the days   Feeling down, depressed, irritable, or hopeless More than half the days   Total Score PHQ 2 4   Trouble falling or staying asleep, or sleeping too much Nearly every day   Feeling tired or having little energy Nearly every day   Poor appetite, weight loss, or overeating Not at all   Feeling bad about yourself - or that you are a failure or have let yourself or your family down Not at all   Trouble concentrating on things such as school, work, reading, or watching TV Not at all   Moving or speaking so slowly that other people could have noticed; or the opposite being so fidgety that others notice Several days   Thoughts of being better off dead, or hurting yourself in some way Not at all   PHQ 9 Score 11   How difficult have these problems made it for you to do your work, take care of your home and get along with others Not difficult at all       Learning Assessment:  Learning Assessment 1/8/2016   PRIMARY LEARNER Patient   HIGHEST LEVEL OF EDUCATION - PRIMARY LEARNER  GRADUATED HIGH SCHOOL OR GED   BARRIERS PRIMARY LEARNER NONE   CO-LEARNER CAREGIVER No   PRIMARY LANGUAGE ENGLISH    NEED No   LEARNER PREFERENCE PRIMARY VIDEOS     DEMONSTRATION     READING     PICTURES   ANSWERED BY patient   RELATIONSHIP SELF       Abuse Screening:  No flowsheet data found. Fall Risk  Fall Risk Assessment, last 12 mths 10/2/2018   Able to walk? Yes   Fall in past 12 months? Yes   Fall with injury? Yes   Number of falls in past 12 months -   Fall Risk Score -         Advance Directive:  1. Do you have an advance directive in place? Patient Reply:on file         1. Have you been to the ER, urgent care clinic since your last visit? Hospitalized since your last visit? No    2.  Have you seen or consulted any other health care providers outside of the [address] [place] [address] since your last visit? Include any pap smears or colon screening.  No

## 2018-10-13 NOTE — PROGRESS NOTES
The patient presents to the office today with the chief complaint of dizziness    HPI    The patient has chronic interstitial pneumonitis with a chronic cough and dyspnea. This had worsened but now it is some better with Prednisone. The patient has a long history of poor hearing which persists unchanged. She also has a long history of dizziness. This has waxed and waned over the years. Now the dizziness is worse. It has a vertiginous component      Review of Systems   Respiratory: Negative for shortness of breath. Cardiovascular: Negative for chest pain and leg swelling. Neurological: Positive for dizziness. Allergies   Allergen Reactions    Penicillin V Potassium Diarrhea     States not allergic    Shellfish Derived Swelling       Current Outpatient Prescriptions   Medication Sig Dispense Refill    meclizine (ANTIVERT) 25 mg tablet 1 tab three times per day (for dizziness) 30 Tab 0    trimethoprim-sulfamethoxazole (BACTRIM DS, SEPTRA DS) 160-800 mg per tablet 1 tab twice per day (Stop Clindamycin) 10 Tab 0    tiotropium bromide (SPIRIVA RESPIMAT) 1.25 mcg/actuation inhaler Take 2 Puffs by inhalation daily. 3 Inhaler 3    predniSONE (DELTASONE) 20 mg tablet 4 tab daily x 2 days, 3 tab daily x 2 days, 2 tab daily x 4 days, 1 tab daily x 4 days 26 Tab 0    dexamethasone (DECADRON) 4 mg tablet       hydroCHLOROthiazide (MICROZIDE) 12.5 mg capsule TAKE 1 CAPSULE BY MOUTH ONCE DAILY IN THE MORNING 90 Cap 0    clotrimazole-betamethasone (LOTRISONE) topical cream APPLY TO AFFECTED AREA TWICE PER DAY 45 g 2    aspirin delayed-release 81 mg tablet Take  by mouth daily.  acetaminophen (TYLENOL) 325 mg tablet Take  by mouth every four (4) hours as needed for Pain.  sertraline (ZOLOFT) 50 mg tablet TAKE ONE TABLET BY MOUTH ONCE DAILY 30 Tab 0    cyanocobalamin 1,000 mcg tablet Take 1,000 mcg by mouth daily.       diazepam (VALIUM) 5 mg tablet Take 5 mg by mouth every six (6) hours as needed for Anxiety.  cetirizine (ZYRTEC) 10 mg tablet Take  by mouth.  azithromycin (ZITHROMAX) 250 mg tablet Take 2 tablets today, then take 1 tablet daily 6 Tab 0    BORTEZOMIB (VELCADE INJECTION) by Injection route.  acyclovir (ZOVIRAX) 400 mg tablet Take 400 mg by mouth two (2) times a day. Past Medical History:   Diagnosis Date    Acute bronchitis     Acute upper respiratory infections of unspecified site     Cough     Essential hypertension, benign     Meniere's disease, unspecified     Muscle pain     Pain in limb        Past Surgical History:   Procedure Laterality Date    HX HYSTERECTOMY      HX MOHS PROCEDURES Left        Social History     Social History    Marital status:      Spouse name: N/A    Number of children: N/A    Years of education: N/A     Occupational History    Not on file. Social History Main Topics    Smoking status: Never Smoker    Smokeless tobacco: Never Used    Alcohol use No    Drug use: No    Sexual activity: Not Currently     Other Topics Concern    Not on file     Social History Narrative       Patient does have an advanced directive on file    Visit Vitals    /68 (BP 1 Location: Left arm, BP Patient Position: Sitting)    Pulse 73    Temp 98.4 °F (36.9 °C) (Tympanic)    Ht 5' 5\" (1.651 m)    Wt 147 lb (66.7 kg)    SpO2 98%    BMI 24.46 kg/m2       Physical Exam   HENT:   Ears:  TM retracted and deformed bilateally   Cardiovascular: Normal rate and regular rhythm. Exam reveals no gallop. No murmur heard. Pulmonary/Chest: She has no wheezes. She has no rales. Abdominal: Soft. She exhibits no distension. BMI:  OK    No visits with results within 3 Month(s) from this visit.   Latest known visit with results is:    Hospital Outpatient Visit on 08/07/2017   Component Date Value Ref Range Status    RPR 08/07/2017 REACTIVE* NR   Final    T. pallidum Ab (FTA-Ab) 08/07/2017 Reactive* Non Reactive   Final Comment: (NOTE)  Performed At: [facility] [id number]  Xavier Lockhart MD [id number]      RPR titer 08/07/2017 1:4   Final       .No results found for any visits on 10/08/18. Assessment / Plan      ICD-10-CM ICD-9-CM    1. Dizziness R42 780.4    2. Chronic interstitial lung disease ([facility] [address] J84.9 515    3. Essential hypertension, benign I10 401.1        Meclizine 25 mg TID  she was advised to continue her maintenance medications  she is to call if symptoms persist over seven days      Follow-up Disposition:  Return in about 3 months (around 1/8/2019). I asked Ana Medina if she has any questions and I answered the questions. Arnulfo Ormond Guss Lichtenstein states that she understands the treatment plan and agrees with the treatment plan

## 2018-10-26 ENCOUNTER — OFFICE VISIT (OUTPATIENT)
Dept: ORTHOPEDIC SURGERY | Facility: CLINIC | Age: 83
End: 2018-10-26

## 2018-10-26 VITALS
RESPIRATION RATE: 16 BRPM | OXYGEN SATURATION: 95 % | WEIGHT: 147 LBS | TEMPERATURE: 96.3 F | HEIGHT: 65 IN | SYSTOLIC BLOOD PRESSURE: 137 MMHG | HEART RATE: 103 BPM | DIASTOLIC BLOOD PRESSURE: 59 MMHG | BODY MASS INDEX: 24.49 KG/M2

## 2018-10-26 DIAGNOSIS — H91.93 BILATERAL HEARING LOSS, UNSPECIFIED HEARING LOSS TYPE: ICD-10-CM

## 2018-10-26 DIAGNOSIS — S43.402A SPRAIN OF LEFT SHOULDER, UNSPECIFIED SHOULDER SPRAIN TYPE, INITIAL ENCOUNTER: Primary | ICD-10-CM

## 2018-10-26 DIAGNOSIS — M25.512 CHRONIC LEFT SHOULDER PAIN: ICD-10-CM

## 2018-10-26 DIAGNOSIS — G89.29 CHRONIC LEFT SHOULDER PAIN: ICD-10-CM

## 2018-10-26 RX ORDER — BUPIVACAINE HYDROCHLORIDE 2.5 MG/ML
4 INJECTION, SOLUTION EPIDURAL; INFILTRATION; INTRACAUDAL ONCE
Qty: 4 ML | Refills: 0
Start: 2018-10-26 | End: 2018-10-26

## 2018-10-26 RX ORDER — BETAMETHASONE SODIUM PHOSPHATE AND BETAMETHASONE ACETATE 3; 3 MG/ML; MG/ML
6 INJECTION, SUSPENSION INTRA-ARTICULAR; INTRALESIONAL; INTRAMUSCULAR; SOFT TISSUE ONCE
Qty: 1 ML | Refills: 0
Start: 2018-10-26 | End: 2018-10-26

## 2018-10-26 RX ORDER — CLINDAMYCIN HYDROCHLORIDE 150 MG/1
CAPSULE ORAL
COMMUNITY
Start: 2018-10-23 | End: 2019-03-24 | Stop reason: ALTCHOICE

## 2018-10-26 NOTE — PATIENT INSTRUCTIONS
Shoulder Arthritis: Exercises Your Care Instructions Here are some examples of typical rehabilitation exercises for your condition. Start each exercise slowly. Ease off the exercise if you start to have pain. Your doctor or physical therapist will tell you when you can start these exercises and which ones will work best for you. How to do the exercises Shoulder flexion (lying down) 1. Lie on your back, holding a wand with both hands. Your palms should face down as you hold the wand. 2. Keeping your elbows straight, slowly raise your arms over your head. Raise them until you feel a stretch in your shoulders, upper back, and chest. 
3. Hold for 15 to 30 seconds. 4. Repeat 2 to 4 times. Shoulder rotation (lying down) 1. Lie on your back. Hold a wand with both hands with your elbows bent and palms up. 2. Keep your elbows close to your body, and move the wand across your body toward the sore arm. 3. Hold for 8 to 12 seconds. 4. Repeat 2 to 4 times. Shoulder internal rotation with towel 1. Hold a towel above and behind your head with the arm that is not sore. 2. With your sore arm, reach behind your back and grasp the towel. 3. With the arm above your head, pull the towel upward. Do this until you feel a stretch on the front and outside of your sore shoulder. 4. Hold 15 to 30 seconds. 5. Repeat 2 to 4 times. Shoulder blade squeeze 1. Stand with your arms at your sides, and squeeze your shoulder blades together. Do not raise your shoulders up as you squeeze. 2. Hold 6 seconds. 3. Repeat 8 to 12 times. Resisted rows 1. Put the band around a solid object at about waist level. (A bedpost will work well.) Each hand should hold an end of the band. 2. With your elbows at your sides and bent to 90 degrees, pull the band back. Your shoulder blades should move toward each other. Return to the starting position. 3. Repeat 8 to 12 times. External rotator strengthening exercise 1. Start by tying a piece of elastic exercise material to a doorknob. You can use surgical tubing or Thera-Band. (You may also hold one end of the band in each hand.) 2. Stand or sit with your shoulder relaxed and your elbow bent 90 degrees. Your upper arm should rest comfortably against your side. Squeeze a rolled towel between your elbow and your body for comfort. This will help keep your arm at your side. 3. Hold one end of the elastic band with the hand of the painful arm. 4. Start with your forearm across your belly. Slowly rotate the forearm out away from your body. Keep your elbow and upper arm tucked against the towel roll or the side of your body until you begin to feel tightness in your shoulder. Slowly move your arm back to where you started. 5. Repeat 8 to 12 times. Internal rotator strengthening exercise 1. Start by tying a piece of elastic exercise material to a doorknob. You can use surgical tubing or Thera-Band. 2. Stand or sit with your shoulder relaxed and your elbow bent 90 degrees. Your upper arm should rest comfortably against your side. Squeeze a rolled towel between your elbow and your body for comfort. This will help keep your arm at your side. 3. Hold one end of the elastic band in the hand of the painful arm. 4. Slowly rotate your forearm toward your body until it touches your belly. Slowly move it back to where you started. 5. Keep your elbow and upper arm firmly tucked against the towel roll or at your side. 6. Repeat 8 to 12 times. Pendulum swing 1. Hold on to a table or the back of a chair with your good arm. Then bend forward a little and let your sore arm hang straight down. This exercise does not use the arm muscles. Rather, use your legs and your hips to create movement that makes your arm swing freely. 2. Use the movement from your hips and legs to guide the slightly swinging arm back and forth like a pendulum (or elephant trunk).  Then guide it in circles that start small (about the size of a dinner plate). Make the circles a bit larger each day, as your pain allows. 3. Do this exercise for 5 minutes, 5 to 7 times each day. 4. As you have less pain, try bending over a little farther to do this exercise. This will increase the amount of movement at your shoulder. Follow-up care is a key part of your treatment and safety. Be sure to make and go to all appointments, and call your doctor if you are having problems. It's also a good idea to know your test results and keep a list of the medicines you take. Where can you learn more? Go to http://david-john.info/. Enter H562 in the search box to learn more about \"Shoulder Arthritis: Exercises. \" Current as of: November 29, 2017 Content Version: 11.8 © 4196-9765 [employer]. Care instructions adapted under license by [employer] (which disclaims liability or warranty for this information). If you have questions about a medical condition or this instruction, always ask your healthcare professional. [address] any warranty or liability for your use of this information. Shoulder Sprain: Care Instructions Your Care Instructions A shoulder sprain occurs when you stretch or tear a ligament in your shoulder. Ligaments are tough tissues that connect one bone to another. A sprain can happen during sports, a fall, or projects around the house. Shoulder sprains usually get better with treatment at home. Follow-up care is a key part of your treatment and safety. Be sure to make and go to all appointments, and call your doctor if you are having problems. It's also a good idea to know your test results and keep a list of the medicines you take. How can you care for yourself at home? · Rest and protect your shoulder. Try to stop or reduce any action that causes pain. · If your doctor gave you a sling or immobilizer, wear it as directed.  A sling or immobilizer supports your shoulder and may make you more comfortable. · Put ice or a cold pack on your shoulder for 10 to 20 minutes at a time. Try to do this every 1 to 2 hours for the next 3 days (when you are awake) or until the swelling goes down. Put a thin cloth between the ice and your skin. Some doctors suggest alternating between hot and cold. · Be safe with medicines. Read and follow all instructions on the label. ? If the doctor gave you a prescription medicine for pain, take it as prescribed. ? If you are not taking a prescription pain medicine, ask your doctor if you can take an over-the-counter medicine. · For the first day or two after an injury, avoid things that might increase swelling, such as hot showers, hot tubs, or hot packs. · After 2 or 3 days, if your swelling is gone, apply a heating pad set on low or a warm cloth to your shoulder. This helps keep your shoulder flexible. Some doctors suggest that you go back and forth between hot and cold. Put a thin cloth between the heating pad and your skin. · Follow your doctor's or physical therapist's directions for exercises. · Return to your usual level of activity slowly. When should you call for help? Call your doctor now or seek immediate medical care if: 
  · Your pain is worse.  
  · You cannot move your shoulder.  
  · Your arm is cool or pale or changes color below the shoulder.  
  · You have tingling, weakness, or numbness in your arm.  
 Watch closely for changes in your health, and be sure to contact your doctor if: 
  · You do not get better as expected. Where can you learn more? Go to http://david-john.info/. Enter P868 in the search box to learn more about \"Shoulder Sprain: Care Instructions. \" Current as of: November 29, 2017 Content Version: 11.8 © 9033-4916 [employer].  Care instructions adapted under license by [employer] (which disclaims liability or warranty for this information). If you have questions about a medical condition or this instruction, always ask your healthcare professional. [place] [address] any warranty or liability for your use of this information.

## 2018-10-26 NOTE — PROGRESS NOTES
Patient: Tiffany Munoz                MRN: [id number]       SSN: [id number] YOB: 1931        AGE: 80 y.o. SEX: female PCP: Guerda Wilson MD 
10/26/18 Chief Complaint Patient presents with  Arm Pain Left arm pain HISTORY:  Tiffany Munoz is a 80 y.o. female who is seen for left shoulder and neck pain. She reports that she felt pain was trimming bushes with short pruning sheers about a month ago. She reports as she was trimming she was unable to lift her arm in front of her. She had taken an antibiotic about a month ago and saw that a ligament tear was on the list of side effects. She notes shoulder pain with overhead activities and at night. She has not been able to sleep well due to her pain. She has been trying to sleep sitting up in a recliner. She reports mostly pain in her mid left humeral diaphyseal area. She has multiple myeloma. She has a chemotherapy injection every Friday at Dr. Ny Rios office. She was previously seen for right hip and right knee pain. She previously responded to a cortisone injection for left hip bursitis. She is s/p right TKR by Dr. Thomas Lorenzo in 2009. She injured her right knee and hip while attending a bereavement meeting after the death of her  when she slipped on a tile floor in February, 2016. She has an aching in her right hip and knee. She has pain at night that radiates from her buttock into her ankle. She has difficulty laying on her right side. She is walking with a limp. Occupation, etc: Ms. Angelita Veras  passed away in September 2015. She has 2 adult sons and one daughter. She lives with her daughter Galileo Butts number is [phone]. She lives in [place] and is very indepenent. She is still driving and does her own house and yard work. 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 She does not walk for exercise anymore. She had a flat tire on the way to her appointment today. She is very hard of hearing. Ms. Michelle Schneider has her sister with her today so she doesn't miss anything. Last 3 Recorded Weights in this Encounter 10/26/18 1458 Weight: 147 lb (66.7 kg) Body mass index is 24.46 kg/m². Patient Active Problem List  
Diagnosis Code  Pain in limb M79.609  Essential hypertension, benign I10  
 Meniere's disease, unspecified H81.09  
 Spinal stenosis of lumbar region M48.061  
 Unresolved grief F43.21  
 ILD (interstitial lung disease) ([facility] [address] J84.9  Syphilis, latent A53.0  Multiple myeloma in remission (HCC) C90.01  
 
 
REVIEW OF SYSTEMS: All Below are Negative except: See HPI Constitutional: negative for fever, chills, and weight loss. Cardiovascular: negative for chest pain, claudication, leg swelling, SOB, MURILLO Gastrointestinal: Negative for pain, N/V/C/D, Blood in stool or urine, dysuria,  hematuria, incontinence, pelvic pain. Musculoskeletal: See HPI Neurological: Negative for dizziness and weakness. Negative for headaches, Visual changes, confusion, seizures Phychiatric/Behavioral: Negative for depression, memory loss, substance  abuse. Extremities: Negative for hair changes, rash, or skin lesion changes. Hematologic: Negative for bleeding problems, bruising, pallor or swollen lymph  nodes Peripheral Vascular: No calf pain, no circulation deficits. Social History Socioeconomic History  Marital status:  Spouse name: Not on file  Number of children: Not on file  Years of education: Not on file  Highest education level: Not on file Social Needs  Financial resource strain: Not on file  Food insecurity - worry: Not on file  Food insecurity - inability: Not on file  Transportation needs - medical: Not on file  Transportation needs - non-medical: Not on file Occupational History  Not on file Tobacco Use  Smoking status: Never Smoker  Smokeless tobacco: Never Used Substance and Sexual Activity  Alcohol use: No  
 Drug use: No  
 Sexual activity: Not Currently Other Topics Concern  Not on file Social History Narrative  Not on file Allergies Allergen Reactions  Penicillin V Potassium Diarrhea States not allergic  Shellfish Derived Swelling Current Outpatient Medications Medication Sig  
 meclizine (ANTIVERT) 25 mg tablet 1 tab three times per day (for dizziness)  tiotropium bromide (SPIRIVA RESPIMAT) 1.25 mcg/actuation inhaler Take 2 Puffs by inhalation daily.  dexamethasone (DECADRON) 4 mg tablet  hydroCHLOROthiazide (MICROZIDE) 12.5 mg capsule TAKE 1 CAPSULE BY MOUTH ONCE DAILY IN THE MORNING  
 clotrimazole-betamethasone (LOTRISONE) topical cream APPLY TO AFFECTED AREA TWICE PER DAY  aspirin delayed-release 81 mg tablet Take  by mouth daily.  BORTEZOMIB (VELCADE INJECTION) by Injection route.  acyclovir (ZOVIRAX) 400 mg tablet Take 400 mg by mouth two (2) times a day.  acetaminophen (TYLENOL) 325 mg tablet Take  by mouth every four (4) hours as needed for Pain.  sertraline (ZOLOFT) 50 mg tablet TAKE ONE TABLET BY MOUTH ONCE DAILY  cyanocobalamin 1,000 mcg tablet Take 1,000 mcg by mouth daily.  diazepam (VALIUM) 5 mg tablet Take 5 mg by mouth every six (6) hours as needed for Anxiety.  cetirizine (ZYRTEC) 10 mg tablet Take  by mouth.  clindamycin (CLEOCIN) 150 mg capsule  trimethoprim-sulfamethoxazole (BACTRIM DS, SEPTRA DS) 160-800 mg per tablet 1 tab twice per day (Stop Clindamycin)  predniSONE (DELTASONE) 20 mg tablet 4 tab daily x 2 days, 3 tab daily x 2 days, 2 tab daily x 4 days, 1 tab daily x 4 days  azithromycin (ZITHROMAX) 250 mg tablet Take 2 tablets today, then take 1 tablet daily No current facility-administered medications for this visit.    
  
PHYSICAL EXAMINATION: 
 Visit Vitals /59 (BP 1 Location: Right arm, BP Patient Position: Sitting) Pulse (!) 103 Temp 96.3 °F (35.7 °C) (Oral) Resp 16 Ht 5' 5\" (1.651 m) Wt 147 lb (66.7 kg) SpO2 95% BMI 24.46 kg/m² ORTHO EXAMINATION: 
Examination Left shoulder Skin Intact Effusion - Biceps deformity - Atrophy -  
AC joint tenderness - Acromial tenderness + Biceps tenderness - Forward flexion/Elevation  with pain Active abduction  External rotation ROM 10 Internal rotation ROM 95 Apprehension - Impingement -  
Drop Arm Test -  
Neurovascular Intact Examination Right hip Skin Intact External Rotation ROM 10 Internal Rotation ROM 5 Trochanteric tenderness + Hip flexion contracture - Antalgic gait + Trendelenberg sign - Lumbar tenderness - Straight leg raise - Calf tenderness - Neurovascular Intact Examination Right knee Skin Well healed incision Range of motion 120-0 Effusion - Medial joint line tenderness + Lateral joint line tenderness - Popliteal tenderness -  
Osteophytes palpable - Ronalds -  
Patella crepitus - Anterior drawer - Lateral laxity - Medial laxity -  
Varus deformity -  
Valgus deformity -  
Pretibial edema -  
Calf tenderness -  
 
PROCEDURES: left shoulder injected 4 cc 0.25% Marcaine and 0.5 cc Celestone. Chart reviewed for the following: 
 Sandra Everett MD, have reviewed the History, Physical and updated the Allergic reactions for [address] [place] [address] performed immediately prior to start of procedure: 
Sandra Everett MD, have performed the following reviews on Unknown Puentes prior to the start of the procedure: 
         
* Patient was identified by name and date of birth * Agreement on procedure being performed was verified * Risks and Benefits explained to the patient * Procedure site verified and marked as necessary * Patient was positioned for comfort * Consent was obtained Time: 3:34 PM  
 
Date of procedure: 10/26/2018 Procedure performed by:  Robinson Marques MD 
 
Ms. Mir tolerated the procedure well with no complications. RADIOGRAPHS:   
XR LEFT HUMERUS 10/26/18 [facility]GRO IMPRESSION:  Three views - No fractures or lytic lesions, moderate acromioclavicular narrowing, mild glenohumeral narrowing, no calcific densities. Osteopenia of humeral head at greater tuberosity. Some flattening at rotator cuff attachment site. Type 3 acromion XR RIGHT KNEE 3/21/16 NEGRO Three views of right knee: well fixed implants, in satisfactory position and alignment. AP pelvis and two views of right hip: no fractures, mild joint space narrowing, no osteophytes present. IMPRESSION:   
  ICD-10-CM ICD-9-CM 1. Sprain of left shoulder, unspecified shoulder sprain type, initial encounter S43.402A 840.9 2. Chronic left shoulder pain M25.512 719.41   
 G89.29 338.29 PLAN: Left shoulder injected 4 cc 0.25% Marcaine and 0.5 cc Celestone. There is no need for surgery at this time. I will see her back as needed. Care instructions provided. Unable to have MRI due to cochlear implant. She also has a shellfish allergy so she is unable to have contrast media.   
 
Scribed by Irma Farias [facility]) as dictated by Robinson Marques MD

## 2018-11-06 ENCOUNTER — HOSPITAL ENCOUNTER (OUTPATIENT)
Dept: PHYSICAL THERAPY | Age: 83
Discharge: HOME | End: 2018-11-06
Payer: MEDICARE

## 2018-11-06 PROCEDURE — 97110 THERAPEUTIC EXERCISES: CPT

## 2018-11-06 PROCEDURE — 97161 PT EVAL LOW COMPLEX 20 MIN: CPT

## 2018-11-06 PROCEDURE — G8985 CARRY GOAL STATUS: HCPCS

## 2018-11-06 PROCEDURE — G8984 CARRY CURRENT STATUS: HCPCS

## 2018-11-06 NOTE — PROGRESS NOTES
PT DAILY TREATMENT NOTE - [facility]  Patient Name: Lizzeth Doty Date:2018 : 1931 [x]  Patient  Verified Payor: [place] MEDICARE / Plan: Johnson Fraziery / Product Type: Medicare / In time:4:09  Out time:5:00 Total Treatment Time (min): 51 Visit #: 1 of 10 Medicare/BCBS Only Total Timed Codes (min):  15 1:1 Treatment Time:  51 Treatment Area: Pain in left shoulder [M25.512] Unspecified sprain of left shoulder joint, initial encounter [S43.402A] Other chronic pain [G89.29] SUBJECTIVE Pain Level (0-10 scale): 8 Any medication changes, allergies to medications, adverse drug reactions, diagnosis change, or new procedure performed?: [x] No    [] Yes (see summary sheet for update) Subjective functional status/changes:   [] No changes reported See POC OBJECTIVE 36 min [x]Eval                  []Re-Eval  
 
15 min Therapeutic Exercise:  [] See flow sheet : HEP instruction and demonstration, pt education regarding anatomy and physiology of the UE and shoulder and how it relates to the pt's condition. Rationale: increase ROM and increase strength to improve the patients ability to tolerate ADLs With 
 [] TE 
 [] TA 
 [] neuro 
 [] other: Patient Education: [x] Review HEP [] Progressed/Changed HEP based on:  
[] positioning   [] body mechanics   [] transfers   [] heat/ice application   
[] other:   
 
Other Objective/Functional Measures: See evaluation. Pain Level (0-10 scale) post treatment: \"sore\" in upper arm ASSESSMENT/Changes in Function: Pt given HEP handout to perform. Pt understood exercises in HEP handout. Pt demonstrated decreased AROM, decreased strength, tenderness/tightness to palpation, and impaired posture. Left Hawkin's Raymond, Neer's, Speed's, Painful Arc of Motion, ER lag, Drop arm, Empty Can tests are all negative. Increased weakness noted with left shoulder flex/ABD/ER MET.  Reported pain with Apley IR on the left but left=right UE during Apley IR ROM testing. Tenderness noted to the left supraspinatus and soreness noted to the left teres minor and biceps muscle belly. Pt would benefit from physical therapy to improve the above impairments to help the pt return to performing ADLs, functional, and household activities. Patient will continue to benefit from skilled PT services to modify and progress therapeutic interventions, address functional mobility deficits, address ROM deficits, address strength deficits, analyze and address soft tissue restrictions, analyze and cue movement patterns, analyze and modify body mechanics/ergonomics, assess and modify postural abnormalities and instruct in home and community integration to attain remaining goals. [x]  See Plan of Care 
[]  See progress note/recertification 
[]  See Discharge Summary Progress towards goals / Updated goals: 
Short Term Goals: To be accomplished in 2 weeks: 1. Pt will report compliance and independence to HEP to help the pt manage their pain and symptoms. Eval: established Long Term Goals: To be accomplished in 5 weeks: 1. Pt will increase FOTO score to 69 points to improve ability to perform ADLs. Eval: 59 points 2. Pt will increase MMT left shoulder flex/ABD/ER to 4-/5 to improve ability to lift objects with the left UE with less pain. Eval: flex/ABD/ER 3+/5 (pain with flex/ABD) 3. Pt will increase AROM left shoulder flex to 140 degs, ABD to 120 degs, ER to 65 degs (all measured in sitting) to improve ease of dressing. Eval: flex 132 degs, ABD 94 deg, ER 57 degs 4. Pt will report being able to sleep throughout the night without awakening secondary to pain in the left UE/shoulder to improve sleep quality. Eval: reports awaking at night secondary to pain 5. Pt will report being able to lift a skillet with the left UE with minimal to no difficulty to improve ease of cooking tasks.   
Eval: unable without with right UE 
 
 PLAN 
[x]  Upgrade activities as tolerated     [x]  Continue plan of care [x]  Update interventions per flow sheet      
[]  Discharge due to:_ 
[]  Other:_ Tano Storey, PT 11/6/2018  5:15 PM 
 
Future Appointments Date Time Provider Roverto Meza 11/6/2018  4:00 PM Jamey Smith, PT [facility]

## 2018-11-06 NOTE — PROGRESS NOTES
In [address] [place] [address], [address] [place], [address] [address] 
[phone] fax Plan of Care/ Statement of Necessity for Physical Therapy Services Patient name: Liliana Samson Start of Care: 2018 Referral source: Nishi Sol MD : 1931 Medical Diagnosis: Pain in left shoulder [M25.512] Unspecified sprain of left shoulder joint, initial encounter [S43.402A] Other chronic pain [G89.29] Onset Date: about 4 weeks agoi Treatment Diagnosis: left shoulder and UE pain Prior Hospitalization: see medical history Provider#: [id number] Medications: Verified on Patient summary List  
 Comorbidities: hearing impaired (cholear implants), multiple myeloma (currently receiving chemo per pt report) Prior Level of Function: Independent with ADLs, functional and household tasks with no pain/limitations with the left shoulder. The Plan of Care and following information is based on the information from the initial evaluation. Assessment/ key information:  
Pt is a 80year old female who presents to therapy today with left shoulder/UE pain. Pt states that her symptoms began about 4 weeks ago when she was trimming/cutting bushes for about 2-3 hours and had increased pain/trouble lifting the clippers. Pt reports having trouble sleeping because of pain, trouble reaching behind her back, and pain with lifting heavy objects with the left UE (needs help from the right UE). Pt demonstrated decreased AROM, decreased strength, tenderness/tightness to palpation, and impaired posture. Left Hawkin's Raymond, Neer's, Speed's, Painful Arc of Motion, ER lag, Drop arm, Empty Can tests are all negative. Increased weakness noted with left shoulder flex/ABD/ER MET. Reported pain with Apley IR on the left but left=right UE during Apley IR ROM testing.  Tenderness noted to the left supraspinatus and soreness noted to the left teres minor and biceps muscle belly. Pt would benefit from physical therapy to improve the above impairments to help the pt return to performing ADLs, functional, and household activities. Evaluation Complexity History MEDIUM  Complexity : 1-2 comorbidities / personal factors will impact the outcome/ POC ; Examination HIGH Complexity : 4+ Standardized tests and measures addressing body structure, function, activity limitation and / or participation in recreation  ;Presentation LOW Complexity : Stable, uncomplicated  ;Clinical Decision Making MEDIUM Complexity : FOTO score of 26-74 Overall Complexity Rating: LOW Problem List: pain affecting function, decrease ROM, decrease strength, decrease ADL/ functional abilitiies, decrease activity tolerance, decrease flexibility/ joint mobility and decrease transfer abilities Treatment Plan may include any combination of the following: Therapeutic exercise, Therapeutic activities, Neuromuscular re-education, Physical agent/modality, Manual therapy, Patient education, Self Care training, Functional mobility training and Home safety training Patient / Family readiness to learn indicated by: asking questions, trying to perform skills and interest 
Persons(s) to be included in education: patient (P) Barriers to Learning/Limitations: yes;  sensory deficits-hearing Patient Goal (s): to be without this ongoing pain and to be able to do most every day chores as [employer] Patient Self Reported Health Status: good Rehabilitation Potential: good Short Term Goals: To be accomplished in 2 weeks: 1. Pt will report compliance and independence to [facility] to help the pt manage their pain and symptoms. Long Term Goals: To be accomplished in 5 weeks: 1. Pt will increase FOTO score to 69 points to improve ability to perform ADLs. 2. Pt will increase MMT left shoulder flex/ABD/ER to 4-/5 to improve ability to lift objects with the left UE with less pain. 3. Pt will increase AROM left shoulder flex to 140 degs, ABD to 120 degs, ER to 65 degs (all measured in sitting) to improve ease of dressing. 4. Pt will report being able to sleep throughout the night without awakening secondary to pain in the left UE/shoulder to improve sleep quality. 5. Pt will report being able to lift a skillet with the left UE with minimal to no difficulty to improve ease of cooking tasks. Frequency / Duration: Patient to be seen 2 times per week for 5 weeks. Patient/ Caregiver education and instruction: Diagnosis, prognosis, self care, activity modification and exercises 
 [x]  Plan of care has been reviewed with PTA 
 
G-Codes (GP) Carry  Current  CK= 40-59%  Goal  CJ= 20-39% The severity rating is based on clinical judgment and the FOTO score. Certification Period: 11/6/2018 - 12/5/2018 Vicentebetzy Starksch, PT 11/6/2018 5:13 PM 
_____________________________________________________________________ I certify that the above Therapy Services are being furnished while the patient is under my care. I agree with the treatment plan and certify that this therapy is necessary. [de-identified] Signature:____________________  Date:__________Time:______ Please sign and return to In [facility] [address] [place] [address], [address] [facility], [address] [place], [place] [address] 
[phone] fax

## 2018-11-14 ENCOUNTER — HOSPITAL ENCOUNTER (OUTPATIENT)
Dept: PHYSICAL THERAPY | Age: 83
Discharge: HOME | End: 2018-11-14
Payer: MEDICARE

## 2018-11-14 PROCEDURE — 97140 MANUAL THERAPY 1/> REGIONS: CPT

## 2018-11-14 PROCEDURE — 97110 THERAPEUTIC EXERCISES: CPT

## 2018-11-19 ENCOUNTER — HOSPITAL ENCOUNTER (OUTPATIENT)
Dept: PHYSICAL THERAPY | Age: 83
Discharge: HOME | End: 2018-11-19
Payer: MEDICARE

## 2018-11-19 PROCEDURE — 97110 THERAPEUTIC EXERCISES: CPT

## 2018-11-19 PROCEDURE — 97140 MANUAL THERAPY 1/> REGIONS: CPT

## 2018-11-19 NOTE — PROGRESS NOTES
PT DAILY TREATMENT NOTE - [facility]  Patient Name: Marylee Rothman Date:2018 : 1931 [x]  Patient  Verified Payor: [place] MEDICARE / Plan: Johnson Barr y / Product Type: Medicare / In time: 12:59   Out time: 1:57 Total Treatment Time (min): 58 Visit #: 3 of 10 Medicare/BCBS Only Total Timed Codes (min):  48 1:1 Treatment Time:  28 Treatment Area: Pain in left shoulder [M25.512] Unspecified sprain of left shoulder joint, initial encounter [S43.402A] Other chronic pain [G89.29] SUBJECTIVE Pain Level (0-10 scale): 5 Any medication changes, allergies to medications, adverse drug reactions, diagnosis change, or new procedure performed?: [x] No    [] Yes (see summary sheet for update) Subjective functional status/changes:   [] No changes reported Pt reports that she felt pretty good after the last session and has less pain. Pt reports doing some yard work over the weekend with no increased pain during this. Pt reports having soreness in the left arm for a few days after the last therapy session. OBJECTIVE Modality rationale: decrease pain and increase tissue extensibility to improve the patients ability to tolerate ADLs Min Type Additional Details  
 [] Estim:  []Unatt       []IFC  []Premod []Other:  []w/ice   []w/heat Position: Location:  
 [] Estim: []Att    []TENS instruct  []NMES []Other:  []w/US   []w/ice   []w/heat Position: Location:  
 []  Traction: [] Cervical       []Lumbar 
                     [] Prone          []Supine []Intermittent   []Continuous Lbs: 
[] before manual 
[] after manual  
 []  Ultrasound: []Continuous   [] Pulsed []1MHz   []3MHz Location: 
W/cm2:  
 []  Iontophoresis with dexamethasone Location: [] Take home patch  
[] In clinic  
10 []  Ice     [x]  heat 
[]  Ice massage 
[]  Laser  
[]  Anodyne Position: hooklying Location: left shoulder  
 []  Laser with stim 
[]  Other: Position: Location:  
 []  Vasopneumatic Device Pressure:       [] lo [] med [] hi  
Temperature: [] lo [] med [] hi  
[] Skin assessment post-treatment:  []intact []redness- no adverse reaction 
  []redness  adverse reaction:  
33 min Therapeutic Exercise:  [x] See flow sheet :  
Rationale: increase ROM and increase strength to improve the patients ability to tolerate ADLs 15 min Manual Therapy:  Left shoulder long axis distraction, left inferior grade 1 and 4 GHJ mobs with ABD PROM; left posterior grade 2 mobs, DTM left pec minor and bicep and UT/LS/rhomboids. Rationale: decrease pain, increase ROM, increase tissue extensibility and decrease trigger points to improve reaching ability. With 
 [] TE 
 [] TA 
 [] neuro 
 [] other: Patient Education: [x] Review HEP [] Progressed/Changed HEP based on:  
[] positioning   [] body mechanics   [] transfers   [] heat/ice application   
[] other:   
 
Other Objective/Functional Measures: tightness and tenderness noted in the left bicep and UT/LS/rhomboid region during manual interventions today. Pain Level (0-10 scale) post treatment: 0 
 
ASSESSMENT/Changes in Function: Reported no pain in the left shoulder post session. Educated pt on avoiding too much household/yard work activity at home and take rest breaks as needed to avoid increased left shoulder pain. Pt reports she is still unable to move her left shoulder into AROM flex in supine at home and was unable to do this post manual interventions. Pt then performed s/l left shoulder ER and then had the pt lay back in supine on the plinth and she was able to actively flex her left shoulder. Cannot rule out possible strengthening limitation in the left shoulder because of this. Continue POC as tolerated.   
 
Patient will continue to benefit from skilled PT services to modify and progress therapeutic interventions, address functional mobility deficits, address ROM deficits, address strength deficits, analyze and address soft tissue restrictions, analyze and cue movement patterns, analyze and modify body mechanics/ergonomics, assess and modify postural abnormalities and instruct in home and community integration to attain remaining goals. []  See Plan of Care 
[]  See progress note/recertification 
[]  See Discharge Summary Progress towards goals / Updated goals: 
Short Term Goals: To be accomplished in 2 weeks: 1. Pt will report compliance and independence to [facility] to help the pt manage their pain and symptoms. Eval: established Current: reports compliance 11/14/2018 Long Term Goals: To be accomplished in 5 weeks: 1. Pt will increase FOTO score to 69 points to improve ability to perform ADLs. Eval: 59 points 2. Pt will increase MMT left shoulder flex/ABD/ER to 4-/5 to improve ability to lift objects with the left UE with less pain. Eval: flex/ABD/ER 3+/5 (pain with flex/ABD) 3. Pt will increase AROM left shoulder flex to 140 degs, ABD to 120 degs, ER to 65 degs (all measured in sitting) to improve ease of dressing. Eval: flex 132 degs, ABD 94 deg, ER 57 degs 4. Pt will report being able to sleep throughout the night without awakening secondary to pain in the left UE/shoulder to improve sleep quality. Eval: reports awaking at night secondary to pain 5. Pt will report being able to lift a skillet with the left UE with minimal to no difficulty to improve ease of cooking tasks. Eval: unable without with right UE 
 
PLAN [x]  Upgrade activities as tolerated     [x]  Continue plan of care [x]  Update interventions per flow sheet      
[]  Discharge due to:_ 
[]  Other:_ Saqib Ch, PT 11/19/2018  1:41 AM 
 
Future Appointments Date Time Provider Roverto Susana 11/19/2018 10:00 AM [facility]  
 11/21/2018 10:00 AM Nico Sr, PT [facility]  
11/26/2018  1:30 PM Camila Branham PT [facility]  
11/28/2018 12:30 PM Sofiya Smith, PT [facility]

## 2018-11-21 ENCOUNTER — HOSPITAL ENCOUNTER (OUTPATIENT)
Dept: PHYSICAL THERAPY | Age: 83
Discharge: HOME | End: 2018-11-21
Payer: MEDICARE

## 2018-11-21 PROCEDURE — 97110 THERAPEUTIC EXERCISES: CPT

## 2018-11-21 PROCEDURE — 97140 MANUAL THERAPY 1/> REGIONS: CPT

## 2018-11-21 NOTE — PROGRESS NOTES
PT DAILY TREATMENT NOTE 10-18 Patient Name: Anthony Grullon Date:2018 : 1931 [x]  Patient  Verified Payor: [place] MEDICARE / Plan: [facility] Barr [facility] / Product Type: Medicare / In time:1001  Out time:1055 Total Treatment Time (min): 48 Visit #: 4 of 10 Medicare/BCBS Only Total Timed Codes (min):  38 1:1 Treatment Time:  25 Treatment Area: Pain in left shoulder [M25.512] Unspecified sprain of left shoulder joint, initial encounter [S43.402A] Other chronic pain [G89.29] SUBJECTIVE Pain Level (0-10 scale): 2 Any medication changes, allergies to medications, adverse drug reactions, diagnosis change, or new procedure performed?: [x] No    [] Yes (see summary sheet for update) Subjective functional status/changes:   [] No changes reported I worked out in my yard yesterday and no increased shoulder pain. My back ached but my shoulder didn't bother me. I think I have arthritis in my back I know I have it in my thumb. OBJECTIVE Modality rationale: decrease pain and increase tissue extensibility to improve the patients ability to aid with increase tolerance to ADLs and activities Min Type Additional Details  
 [] Estim:  []Unatt       []IFC  []Premod []Other:  []w/ice   []w/heat Position: Location:  
 [] Estim: []Att    []TENS instruct  []NMES []Other:  []w/US   []w/ice   []w/heat Position: Location:  
 []  Traction: [] Cervical       []Lumbar 
                     [] Prone          []Supine []Intermittent   []Continuous Lbs: 
[] before manual 
[] after manual  
 []  Ultrasound: []Continuous   [] Pulsed []1MHz   []3MHz W/cm2: 
Location:  
 []  Iontophoresis with dexamethasone Location: [] Take home patch  
[] In clinic  
10 []  Ice     [x]  Heat post 
[]  Ice massage 
[]  Laser  
[]  Anodyne Position:reclined Location:left shoulder/UT []  Laser with stim []  Other:  Position: Location:  
 []  Vasopneumatic Device Pressure:       [] lo [] med [] hi  
Temperature: [] lo [] med [] hi  
[] Skin assessment post-treatment:  []intact []redness- no adverse reaction 
  []redness  adverse reaction:  
 
  min []Eval                  []Re-Eval  
 
 
23 min Therapeutic Exercise:  [x] See flow sheet :  
Rationale: increase ROM, increase strength and improve coordination to improve the patients ability to aid with increase tolerance to ADLS and activities 
 
 min Therapeutic Activity:  []  See flow sheet :  
Rationale:   to improve the patients ability to  
  
 min Neuromuscular Re-education:  []  See flow sheet :  
Rationale:   to improve the patients ability to  
 
15 min Manual Therapy:  LAD oscillations and GH mobs left shoulder. PROM all planes Rationale: decrease pain, increase ROM, increase tissue extensibility and decrease trigger points to aid with increase tolerance to ADLs and activities 
 
 min Gait Training:  ___ feet with ___ device on level surfaces with ___ level of assist  
Rationale: With 
 [] TE 
 [] TA 
 [] neuro 
 [] other: Patient Education: [x] Review HEP [] Progressed/Changed HEP based on:  
[] positioning   [] body mechanics   [] transfers   [] heat/ice application   
[] other:   
 
Other Objective/Functional Measures: VC exercises and technique. Some reports of left UT pulling and tightness. Pain Level (0-10 scale) post treatment: 0 
 
ASSESSMENT/Changes in Function: tolerated well with reports of decrease pain and improved function , use with yard work yesterday.   
 
Patient will continue to benefit from skilled PT services to modify and progress therapeutic interventions, address ROM deficits, address strength deficits, analyze and address soft tissue restrictions, analyze and cue movement patterns, analyze and modify body mechanics/ergonomics, assess and modify postural abnormalities, address imbalance/dizziness and instruct in home and community integration to attain remaining goals. [x]  See Plan of Care 
[]  See progress note/recertification 
[]  See Discharge Summary Progress towards goals / Updated goals: 
Short Term Goals: To be accomplished in 2 weeks: 1. Pt will report compliance and independence to HEP to help the pt manage their pain and symptoms.            
Eval: established Current: reports compliance 11/14/2018 11/21/18 Long Term Goals: To be accomplished in 5 weeks: 1. Pt will increase FOTO score to 69 points to improve ability to perform ADLs. Eval: 59 points CURRENT - recheck next session 11/21/18 2. Pt will increase MMT left shoulder flex/ABD/ER to 4-/5 to improve ability to lift objects with the left UE with less pain. Eval: flex/ABD/ER 3+/5 (pain with flex/ABD) CURRENT 3. Pt will increase AROM left shoulder flex to 140 degs, ABD to 120 degs, ER to 65 degs (all measured in sitting) to improve ease of dressing. Eval: flex 132 degs, ABD 94 deg, ER 57 degs CURRENT 4. Pt will report being able to sleep throughout the night without awakening secondary to pain in the left UE/shoulder to improve sleep quality. Eval: reports awaking at night secondary to pain CURRENT 5. Pt will report being able to lift a skillet with the left UE with minimal to no difficulty to improve ease of cooking tasks.  
Eval: unable without with right UE 
CURRENT improved use of Left UE with yard work and reaching over in bed to turn off her light 
  
 
 
 
PLAN [x]  Upgrade activities as tolerated     [x]  Continue plan of care 
[]  Update interventions per flow sheet      
[]  Discharge due to:_ 
[]  Other:_ Len Blanchard, PT 11/21/2018  10:24 AM 
 
Future Appointments Date Time Provider Roverto Meza 11/26/2018  1:30 PM Radha Lundberg, PT [facility]  
11/28/2018 12:30 PM Conca, Ceclia Favre, PT [facility]

## 2018-11-26 ENCOUNTER — HOSPITAL ENCOUNTER (OUTPATIENT)
Dept: PHYSICAL THERAPY | Age: 83
Discharge: HOME | End: 2018-11-26
Payer: MEDICARE

## 2018-11-26 PROCEDURE — 97110 THERAPEUTIC EXERCISES: CPT

## 2018-11-26 PROCEDURE — 97140 MANUAL THERAPY 1/> REGIONS: CPT

## 2018-11-26 NOTE — PROGRESS NOTES
PT DAILY TREATMENT NOTE 10-18 Patient Name: Rosy Velazco Date:2018 : 1931 [x]  Patient  Verified Payor: [place] MED[place]ARE / Plan: Johnson Broussard / Product Type: Medicare / In time:135  Out time:233 Total Treatment Time (min): 50 Visit #: 5 of 10 Medicare/BCBS Only Total Timed Codes (min):  40 1:1 Treatment Time:  40 Treatment Area: Pain in left shoulder [M25.512] Unspecified sprain of left shoulder joint, initial encounter [S43.402A] Other chronic pain [G89.29] SUBJECTIVE Pain Level (0-10 scale): 7 Any medication changes, allergies to medications, adverse drug reactions, diagnosis change, or new procedure performed?: [x] No    [] Yes (see summary sheet for update) Subjective functional status/changes:   [] No changes reported States every time she turned over last night her shoulder was bothering her. States she is unsure if the increase in her pain is due to her arthritis. OBJECTIVE Modality rationale: decrease pain and increase tissue extensibility to improve the patients ability to aid with increase tolerance to ADLS and activities Min Type Additional Details  
 [] Estim:  []Unatt       []IFC  []Premod []Other:  []w/ice   []w/heat Position: Location:  
 [] Estim: []Att    []TENS instruct  []NMES []Other:  []w/US   []w/ice   []w/heat Position: Location:  
 []  Traction: [] Cervical       []Lumbar 
                     [] Prone          []Supine []Intermittent   []Continuous Lbs: 
[] before manual 
[] after manual  
 []  Ultrasound: []Continuous   [] Pulsed []1MHz   []3MHz W/cm2: 
Location:  
 []  Iontophoresis with dexamethasone Location: [] Take home patch  
[] In clinic  
10 []  Ice     [x]  Heat post 
[]  Ice massage 
[]  Laser  
[]  Anodyne Position:reclined Location:left shoulder  
 []  Laser with stim [x]  Other:  Position: Location: []  Vasopneumatic Device Pressure:       [] lo [] med [] hi  
Temperature: [] lo [] med [] hi  
[] Skin assessment post-treatment:  []intact []redness- no adverse reaction 
  []redness  adverse reaction:  
 
  min []Eval                  []Re-Eval  
 
 
25 min Therapeutic Exercise:  [] See flow sheet :  
Rationale: increase ROM, increase strength and improve coordination to improve the patients ability to aid with increase tolerance to ADLs and activities 
 
 min Therapeutic Activity:  []  See flow sheet :  
Rationale:   to improve the patients ability to  
  
 min Neuromuscular Re-education:  []  See flow sheet :  
Rationale:   to improve the patients ability to  
 
15 min Manual Therapy:  GH MOBS, PROM all planes left shoulder, gentle LAD and oscill. Rationale: decrease pain, increase ROM, increase tissue extensibility and decrease trigger points to aid with increase tolerance to ADLS and activities 
 
 min Gait Training:  ___ feet with ___ device on level surfaces with ___ level of assist  
Rationale: With 
 [] TE 
 [] TA 
 [] neuro 
 [] other: Patient Education: [x] Review HEP [] Progressed/Changed HEP based on:  
[] positioning   [] body mechanics   [] transfers   [] heat/ice application   
[] other:   
 
Other Objective/Functional Measures: VC exercises and tech  FOTO 58 Pain Level (0-10 scale) post treatment: 6 
 
ASSESSMENT/Changes in Function: Tolerated well Patient will continue to benefit from skilled PT services to modify and progress therapeutic interventions, address ROM deficits, address strength deficits, analyze and address soft tissue restrictions, analyze and cue movement patterns, analyze and modify body mechanics/ergonomics, assess and modify postural abnormalities and instruct in home and community integration to attain remaining goals. [x]  See Plan of Care 
[]  See progress note/recertification 
[]  See Discharge Summary Progress towards goals / Updated goals: 
Short Term Goals: To be accomplished in 2 weeks: 1. Pt will report compliance and independence to [facility] to help the pt manage their pain and symptoms.            
Eval: established Current: reports compliance 11/14/2018 11/21/18 11/26/18 Long Term Goals: To be accomplished in 5 weeks: 1. Pt will increase FOTO score to 69 points to improve ability to perform ADLs. Eval: 59 points CURRENT -  58  11/26/18 2. Pt will increase MMT left shoulder flex/ABD/ER to 4-/5 to improve ability to lift objects with the left UE with less pain. Eval: flex/ABD/ER 3+/5 (pain with flex/ABD) CURRENT 3. Pt will increase AROM left shoulder flex to 140 degs, ABD to 120 degs, ER to 65 degs (all measured in sitting) to improve ease of dressing. Eval: flex 132 degs, ABD 94 deg, ER 57 degs CURRENT 4. Pt will report being able to sleep throughout the night without awakening secondary to pain in the left UE/shoulder to improve sleep quality. Eval: reports awaking at night secondary to pain CURRENT the pain kept her up last night 11/26/18 5. Pt will report being able to lift a skillet with the left UE with minimal to no difficulty to improve ease of cooking tasks.  
Eval: unable without with right UE 
CURRENT improved use of Left UE with yard work and reaching over in bed to turn off her light 
  
  
 
 
 
PLAN [x]  Upgrade activities as tolerated     [x]  Continue plan of care 
[]  Update interventions per flow sheet      
[]  Discharge due to:_ 
[]  Other:_ Cheko Dugan PT 11/26/2018  1:41 PM 
 
Future Appointments Date Time Provider Roverto Meza 11/28/2018 12:30 PM Lakeshia Smith, PT [facility]

## 2018-11-28 ENCOUNTER — HOSPITAL ENCOUNTER (OUTPATIENT)
Dept: PHYSICAL THERAPY | Age: 83
Discharge: HOME | End: 2018-11-28
Payer: MEDICARE

## 2018-11-28 PROCEDURE — 97140 MANUAL THERAPY 1/> REGIONS: CPT

## 2018-11-28 PROCEDURE — 97110 THERAPEUTIC EXERCISES: CPT

## 2018-11-28 NOTE — PROGRESS NOTES
PT DAILY TREATMENT NOTE 10-18 Patient Name: Concepcion Ca Date:2018 : 1931 [x]  Patient  Verified Payor: [place] MEDICARE / Plan: Johnson Barr y / Product Type: Medicare / In time: 12:30   Out time: 1:21 Total Treatment Time (min): 51 Visit #: 6 of 10 Medicare/BCBS Only Total Timed Codes (min):  41 1:1 Treatment Time:  23 Treatment Area: Pain in left shoulder [M25.512] Unspecified sprain of left shoulder joint, initial encounter [S43.402A] Other chronic pain [G89.29] SUBJECTIVE Pain Level (0-10 scale): 3 Any medication changes, allergies to medications, adverse drug reactions, diagnosis change, or new procedure performed?: [x] No    [] Yes (see summary sheet for update) Subjective functional status/changes:   [] No changes reported Pt reports her pain is better than it was the other day. OBJECTIVE Modality rationale: decrease pain and increase tissue extensibility to improve the patients ability to aid with increase tolerance to ADLs Min Type Additional Details  
 [] Estim:  []Unatt       []IFC  []Premod []Other:  []w/ice   []w/heat Position: Location:  
 [] Estim: []Att    []TENS instruct  []NMES []Other:  []w/US   []w/ice   []w/heat Position: Location:  
 []  Traction: [] Cervical       []Lumbar 
                     [] Prone          []Supine []Intermittent   []Continuous Lbs: 
[] before manual 
[] after manual  
 []  Ultrasound: []Continuous   [] Pulsed []1MHz   []3MHz W/cm2: 
Location:  
 []  Iontophoresis with dexamethasone Location: [] Take home patch  
[] In clinic  
10 []  Ice     [x]  Heat 
[]  Ice massage 
[]  Laser  
[]  Anodyne Position:reclined Location:left shoulder  
 []  Laser with stim [x]  Other:  Position: Location:  
 []  Vasopneumatic Device Pressure:       [] lo [] med [] hi  
Temperature: [] lo [] med [] hi  
 [] Skin assessment post-treatment:  []intact []redness- no adverse reaction 
  []redness  adverse reaction:  
 
26 min Therapeutic Exercise:  [x] See flow sheet :  
Rationale: increase ROM, increase strength and improve coordination to improve the patients ability to aid with increase tolerance to ADLs and activities 15 min Manual Therapy: left scap mobs in all planes, DTM/TPR left UT/levator/rhomboids, left shoulder gentle long axis distraction, left inferior grade 1 and 4 GHJ mobs with ABD PROM, left GHJ posterior grade 2 mobs Rationale: decrease pain, increase ROM, increase tissue extensibility and decrease trigger points to aid with increase tolerance to ADLS and activities With 
 [] TE 
 [] TA 
 [] neuro 
 [] other: Patient Education: [x] Review HEP [] Progressed/Changed HEP based on:  
[] positioning   [] body mechanics   [] transfers   [] heat/ice application   
[] other:   
 
Other Objective/Functional Measures: Continues to have limited left shoulder ABD PROM as noted with manual interventions. Pain Level (0-10 scale) post treatment: 0 
 
ASSESSMENT/Changes in Function: Pt continues to have limitations in pain and mobility in the left shoulder. We will plan on continuing therapy at this time to improve the pt's pain and ROM to improve ease of reaching and independence. Reported no pain post session today. Continue POC as tolerated. Patient will continue to benefit from skilled PT services to modify and progress therapeutic interventions, address ROM deficits, address strength deficits, analyze and address soft tissue restrictions, analyze and cue movement patterns, analyze and modify body mechanics/ergonomics, assess and modify postural abnormalities and instruct in home and community integration to attain remaining goals. []  See Plan of Care 
[]  See progress note/recertification 
[]  See Discharge Summary Progress towards goals / Updated goals: Short Term Goals: To be accomplished in 2 weeks: 1. Pt will report compliance and independence to [facility] to help the pt manage their pain and symptoms.            
Eval: established Current: reports compliance 11/14/2018 11/21/18 11/26/18 Long Term Goals: To be accomplished in 5 weeks: 1. Pt will increase FOTO score to 69 points to improve ability to perform ADLs. Eval: 59 points CURRENT -  58  11/26/18 2. Pt will increase MMT left shoulder flex/ABD/ER to 4-/5 to improve ability to lift objects with the left UE with less pain. Eval: flex/ABD/ER 3+/5 (pain with flex/ABD) CURRENT 3. Pt will increase AROM left shoulder flex to 140 degs, ABD to 120 degs, ER to 65 degs (all measured in sitting) to improve ease of dressing. Eval: flex 132 degs, ABD 94 deg, ER 57 degs CURRENT 4. Pt will report being able to sleep throughout the night without awakening secondary to pain in the left UE/shoulder to improve sleep quality. Eval: reports awaking at night secondary to pain CURRENT the pain kept her up last night 11/26/18 5. Pt will report being able to lift a skillet with the left UE with minimal to no difficulty to improve ease of cooking tasks.  
Eval: unable without with right UE 
CURRENT improved use of Left UE with yard work and reaching over in bed to turn off her light PLAN [x]  Upgrade activities as tolerated     [x]  Continue plan of care [x]  Update interventions per flow sheet      
[]  Discharge due to:_ 
[]  Other:_ Orkenroy Corrigan, PT 11/28/2018  12:40 PM 
 
Future Appointments Date Time Provider Roverto Meza 11/28/2018 12:30 PM Terri Smith, PT [facility]

## 2018-12-04 ENCOUNTER — HOSPITAL ENCOUNTER (OUTPATIENT)
Dept: PHYSICAL THERAPY | Age: 83
Discharge: HOME | End: 2018-12-04
Payer: MEDICARE

## 2018-12-04 PROCEDURE — 97530 THERAPEUTIC ACTIVITIES: CPT

## 2018-12-04 PROCEDURE — G8985 CARRY GOAL STATUS: HCPCS

## 2018-12-04 PROCEDURE — G8984 CARRY CURRENT STATUS: HCPCS

## 2018-12-04 RX ORDER — HYDROCHLOROTHIAZIDE 12.5 MG/1
CAPSULE ORAL
Qty: 90 CAP | Refills: 0 | Status: SHIPPED | OUTPATIENT
Start: 2018-12-04 | End: 2019-03-22 | Stop reason: SDUPTHER

## 2018-12-04 NOTE — PROGRESS NOTES
PT DAILY TREATMENT NOTE 10-18 Patient Name: Andreia Morales Date:2018 : 1931 [x]  Patient  Verified Payor: [place] MEDICARE / Plan: Johnson Barr y / Product Type: Medicare / In time: 3:40    Out time: 4:04 Total Treatment Time (min): 24 Visit #: 7 of 10 Medicare/BCBS Only Total Timed Codes (min):  24 1:1 Treatment Time:  24  
 
 
Treatment Area: Pain in left shoulder [M25.512] Unspecified sprain of left shoulder joint, initial encounter [S43.402A] Other chronic pain [G89.29] SUBJECTIVE Pain Level (0-10 scale): 0 Any medication changes, allergies to medications, adverse drug reactions, diagnosis change, or new procedure performed?: [x] No    [] Yes (see summary sheet for update) Subjective functional status/changes:   [] No changes reported Pt reports that she thought her therapy appointment was at 3:30 today and not at 3pm. Pt continues to report having pain with sleeping. OBJECTIVE 24 min Therapeutic Activity:  []  See flow sheet : goal reassessment, obtaining subjective comments from pt for re-certification. Rationale: increase ROM and increase strength  to improve the patients ability to tolerate functional tasks. With 
 [] TE 
 [x] TA 
 [] neuro 
 [] other: Patient Education: [x] Review HEP [] Progressed/Changed HEP based on:  
[] positioning   [] body mechanics   [] transfers   [] heat/ice application   
[] other:   
 
Other Objective/Functional Measures: See goals below. Pain Level (0-10 scale) post treatment: 0 
 
ASSESSMENT/Changes in Function: See re-certification. Held exercises and therapy interventions today secondary pt being 40 mins late to therapy and performing goal reassessment for the re-certification. Pt demonstrates improvements in ROM and mobility since starting therapy. Pt reports that she continues to have pain with sleeping.  Pt states that she has been performing her HEP exercises at home along with adding exercises that she is performing in the clinic. Pt reports that she feels she can do the exercises at home but is fearful of regressing. Discussed with pt regarding placing pt on hold for 2 weeks to see how she does independently with self-management of symptoms with HEP exercises and pt agreed. We will plan on d/c'ing pt in 2 weeks if pt reports being able to manage symptoms independently with HEP. We will plan on continuing therapy in 2 weeks if pt reports that she continues to have functional limitations and increased left shoulder pain after being placed on hold. Patient will continue to benefit from skilled PT services to modify and progress therapeutic interventions, address ROM deficits, address strength deficits, analyze and address soft tissue restrictions, analyze and cue movement patterns, analyze and modify body mechanics/ergonomics, assess and modify postural abnormalities and instruct in home and community integration to attain remaining goals. []  See Plan of Care [x]  See progress note/recertification 
[]  See Discharge Summary Progress towards goals / Updated goals: 
Short Term Goals: To be accomplished in 2 weeks: 1. Pt will report compliance and independence to HEP to help the pt manage their pain and symptoms.            
Eval: established Current: MET, reports compliance 11/26/18 Long Term Goals: To be accomplished in 5 weeks: 1. Pt will increase FOTO score to 69 points to improve ability to perform ADLs. Eval: 59 points CURRENT not met,  58 points 11/26/18 2. Pt will increase MMT left shoulder flex/ABD/ER to 4-/5 to improve ability to lift objects with the left UE with less pain. Eval: flex/ABD/ER 3+/5 (pain with flex/ABD) CURRENT not met, flex 3+/5 with pain in the upper arm, ABD 3+/5 with pain in the neck region, ER 4-/5 12/4/2018 3.  Pt will increase AROM left shoulder flex to 140 degs, ABD to 120 degs, ER to 65 degs (all measured in sitting) to improve ease of dressing. Eval: flex 132 degs, ABD 94 deg, ER 57 degs CURRENT MET, flex 141 degs with pain during ROM,  degs, ER 71 degs with pain in the left shoulder 12/4/2018 4. Pt will report being able to sleep throughout the night without awakening secondary to pain in the left UE/shoulder to improve sleep quality. Eval: reports awaking at night secondary to pain CURRENT not met, reports having increased pain with sleeping 12/4/2018 5. Pt will report being able to lift a skillet with the left UE with minimal to no difficulty to improve ease of cooking tasks.  
Eval: unable without with right UE 
CURRENT progressing, improved AROM of the left shoulder noted 12/4/2018 PLAN [x]  Upgrade activities as tolerated     [x]  Continue plan of care [x]  Update interventions per flow sheet      
[]  Discharge due to:_ 
[]  Other:_ Mundo Good, PT 12/4/2018  4:06 PM 
 
Future Appointments Date Time Provider Roverto Meza 12/18/2018 10:00 AM Eliz Smith, PT [facility]

## 2018-12-04 NOTE — PROGRESS NOTES
In DeaAlfredo Brandon Ksa[address] [address] [place] [address], [address] [place], [address] [place], [place] [address],[facility] 300 
[phone] fax Continued Plan of Care/ Re-certification for Physical Therapy Services Patient name: Ashley Agarwal Start of Care: 2018 Referral source: Adam Alonso MD : 1931 Medical Diagnosis: Pain in left shoulder [M25.512] Unspecified sprain of left shoulder joint, initial encounter [S43.402A] Other chronic pain [G89.29] 
  Onset Date: about 4 weeks agoi Treatment Diagnosis: left shoulder and UE pain Prior Hospitalization: see medical history Provider#: [id number] Medications: Verified on Patient summary List  
 Comorbidities: hearing impaired (cholear implants), multiple myeloma (currently receiving chemo per pt report) Prior Level of Function: Independent with ADLs, functional and household tasks with no pain/limitations with the left shoulder. Visits from Start of Care: 7    Missed Visits: 0 The Plan of Care and following information is based on the patient's current status: 
Goal: Pt will report compliance and independence to [facility] to help the pt manage their pain and symptoms.     
Status at last note/certification: MET, reports compliance Current Status: met Goal: Pt will increase FOTO score to 69 points to improve ability to perform ADLs. Status at last note/certification: not met, 58 points Current Status: not met Goal: Pt will increase MMT left shoulder flex/ABD/ER to 4-/5 to improve ability to lift objects with the left UE with less pain. Status at last note/certification: not met, flex 3+/5 with pain in the upper arm, ABD 3+/5 with pain in the neck region, ER 4-/5 Current Status: not met Goal: Pt will increase AROM left shoulder flex to 140 degs, ABD to 120 degs, ER to 65 degs (all measured in sitting) to improve ease of dressing.  
Status at last note/certification: MET, flex 141 degs with pain during ROM,  degs, ER 71 degs with pain in the left shoulder Current Status: met Goal: Pt will report being able to sleep throughout the night without awakening secondary to pain in the left UE/shoulder to improve sleep quality. Status at last note/certification:  not met, reports having increased pain with sleeping Current Status: not met Goal: Pt will report being able to lift a skillet with the left UE with minimal to no difficulty to improve ease of cooking tasks.  
Status at last note/certification: progressing, improved AROM of the left shoulder noted Current Status: not met Key functional changes:  
Improvement in left shoulder AROM is noted with AROM measurements. Pt reports she continues to have trouble with sleeping. Problems/ barriers to goal attainment: none Problem List: pain affecting function, decrease ROM, decrease strength, decrease ADL/ functional abilitiies, decrease activity tolerance, decrease flexibility/ joint mobility and decrease transfer abilities Treatment Plan: Therapeutic exercise, Therapeutic activities, Neuromuscular re-education, Physical agent/modality, Manual therapy, Patient education, Self Care training, Functional mobility training and Home safety training Patient Goal (s) has been updated and includes: less pain with sleeping Goals for this certification period to be accomplished in 4 weeks after 2 week hold if continuing therapy: 1. Pt will increase FOTO score to 69 points to improve ability to perform ADLs. PN: 58 points 2. Pt will increase AROM left shoulder flex to 150 degs, ABD to 145 degs to improve ease of dressing. PN: flex 141 degs,  degs 3. Pt will report being able to sleep throughout the night without awakening secondary to pain in the left UE/shoulder to improve sleep quality. PN: not met, reports having increased pain with sleeping 4.  Pt will report being able to lift a skillet with the left UE with minimal to no difficulty to improve ease of cooking tasks.   
PN:  progressing, improved AROM of the left shoulder noted 5. Pt will increase FOTO score to 69 points to improve ability to perform ADLs. PN: 58 points Frequency / Duration: Patient to be seen 2 times per week for 4 weeks after 2 week hold if continuing therapy: 
Assessment / Recommendations: 
Pt demonstrates improvements in ROM and mobility since starting therapy. Pt reports that she continues to have pain with sleeping. Pt states that she has been performing her HEP exercises at home along with adding exercises that she is performing in the clinic. Pt reports that she feels she can do the exercises at home but is fearful of regressing. Discussed with pt regarding placing pt on hold for 2 weeks to see how she does independently with self-management of symptoms with HEP exercises and pt agreed. We will plan on d/c'ing pt in 2 weeks if pt reports being able to manage symptoms independently with HEP. We will plan on continuing therapy in 2 weeks if pt reports that she continues to have functional limitations and increased left shoulder pain after being placed on hold. G-Codes (GP) Carry  Current  CK= 40-59%  Goal  CJ= 20-39% The severity rating is based on clinical judgment and the FOTO score. Certification Period: 12/4/2018 - 1/2/2019 Florence Mahoney PT 12/4/2018 4:30 PM 
 
________________________________________________________________________ I certify that the above Therapy Services are being furnished while the patient is under my care. I agree with the treatment plan and certify that this therapy is necessary. [] I have read the above and request that my patient continue as recommended. [] I have read the above report and request that my patient continue therapy with the following changes/special instructions: _____________________________________________ [] I have read the above report and request that my patient be discharged from therapy [phone] Signature:____________Date:_________TIME:________ 
 
[facility], Date and Time must be completed for valid certification ** Please sign and return to In [address] Hor[address] [place] [address], [address], [address] [place] [address],[facility] 300 
[phone] fax

## 2018-12-07 ENCOUNTER — HOSPITAL ENCOUNTER (OUTPATIENT)
Dept: GENERAL RADIOLOGY | Age: 83
Discharge: HOME | End: 2018-12-07
Payer: MEDICARE

## 2018-12-07 DIAGNOSIS — D47.2 MGUS (MONOCLONAL GAMMOPATHY OF UNKNOWN SIGNIFICANCE): ICD-10-CM

## 2018-12-07 PROCEDURE — 73502 X-RAY EXAM HIP UNI 2-3 VIEWS: CPT

## 2018-12-18 ENCOUNTER — HOSPITAL ENCOUNTER (OUTPATIENT)
Dept: PHYSICAL THERAPY | Age: 83
Discharge: HOME | End: 2018-12-18
Payer: MEDICARE

## 2018-12-18 PROCEDURE — 97110 THERAPEUTIC EXERCISES: CPT

## 2018-12-18 PROCEDURE — 97530 THERAPEUTIC ACTIVITIES: CPT

## 2018-12-18 PROCEDURE — G8985 CARRY GOAL STATUS: HCPCS

## 2018-12-18 PROCEDURE — G8986 CARRY D/C STATUS: HCPCS

## 2018-12-18 NOTE — PROGRESS NOTES
PT DISCHARGE DAILY NOTE AND [id number]    Date:2018  Patient name: Lorena Garsia Start of Care: 2018   Referral source: Author Pb Multani MD : [id number]               Medical Diagnosis: Pain in left shoulder [M25.512]  Unspecified sprain of left shoulder joint, initial encounter [S43.402A]  Other chronic pain [G89.29]    Onset Date: about 4 weeks agoi               Treatment Diagnosis: left shoulder and UE pain   Prior Hospitalization: see medical history Provider#: [id number]   Medications: Verified on Patient summary List    Comorbidities: hearing impaired (cholear implants), multiple myeloma (currently receiving chemo per pt report)    Prior Level of Function: Independent with ADLs, functional and household tasks with no pain/limitations with the left shoulder.   Visits from Start of Care: 7                                              Missed Visits: 0  Reporting Period : 2018 to 2018    Date:2018  : 1931  [x]  Patient  Verified  Payor: [place] MEDICARE / Plan: [place] MEDICARE PART A & B / Product Type: Medicare /    In time:10:07  Out time:10:33  Total Treatment Time (min): 26  Visit #: 8 of 10    Medicare/BCBS Only   Total Timed Codes (min):  26 1:1 Treatment Time:  24       SUBJECTIVE  Pain Level (0-10 scale): 0  Any medication changes, allergies to medications, adverse drug reactions, diagnosis change, or new procedure performed?: [x] No    [] Yes (see summary sheet for update)  Subjective functional status/changes:   [] No changes reported  Reports she has been doing very well over the past week. Pt reports she only has pain with certain movements but has no pain with sleeping, reaching, and with cooking. Pt states she is ready for d/c.      OBJECTIVE    16 min Therapeutic Exercise:  [x] See flow sheet : nay, goal reassessment   Rationale: increase ROM and increase strength to improve the patients ability to tolerate ADLs    10 min Therapeutic Activity:  []  See flow sheet : D/c instructions, FOTO    Rationale: increase ROM and increase strength  to improve the patients ability to independently manage symptoms. With   [] TE   [x] TA   [] neuro   [] other: Patient Education: [x] Review HEP    [] Progressed/Changed HEP based on:   [] positioning   [] body mechanics   [] transfers   [] heat/ice application    [] other:      Other Objective/Functional Measures: See goals below. Pain Level (0-10 scale) post treatment: 0    Summary of Care:  Goal: Pt will increase FOTO score to 69 points to improve ability to perform ADLs. Status at last note/certification: not met, 62 points   Status at discharge: not met    Goal: Pt will increase AROM left shoulder flex to 150 degs, ABD to 145 degs to improve ease of dressing. Status at last note/certification: MET, flex 152 degs,  degs   Status at discharge: met    Goal: Pt will report being able to sleep throughout the night without awakening secondary to pain in the left UE/shoulder to improve sleep quality. Status at last note/certification: MET, reports no pain with sleeping   Status at discharge: met    Goal: Pt will report being able to lift a skillet with the left UE with minimal to no difficulty to improve ease of cooking tasks.    Status at last note/certification: MET, reports having no trouble with lifting a skillet while cooking   Status at discharge: met    ASSESSMENT/Changes in Function:   Pt was seen for 8 therapy sessions. Pt demonstrated/reports improvements in pain, mobility, AROM, sleep quality, and ability to perform functional and household tasks since starting therapy. Pt reports having pain in the left shoulder with certain movements but this pain has not limited her with her daily activities per pt report. Pt reports compliance to her current HEP.  Pt is d/c'ed from therapy at this time to HEP secondary to improvement in pain/symptoms and ability to independently perform HEP to manage current deficits. Thank you for this referral!      G-Codes (GP)  Carry    Goal  CJ= 20-39%   D/C  CJ= 20-39%    The severity rating is based on clinical judgment and the FOTO score.     PLAN  [x]Discontinue therapy: [x]Patient has reached or is progressing toward set goals      []Patient is non-compliant or has abdicated      []Due to lack of appreciable progress towards set goals    Curry Campa, PT 12/18/2018  10:39 AM

## 2019-01-04 ENCOUNTER — HOSPITAL ENCOUNTER (OUTPATIENT)
Dept: GENERAL RADIOLOGY | Age: 84
Discharge: HOME | End: 2019-01-04
Payer: MEDICARE

## 2019-01-04 DIAGNOSIS — C90.00 MULTIPLE MYELOMA (HCC): ICD-10-CM

## 2019-01-04 PROCEDURE — 72100 X-RAY EXAM L-S SPINE 2/3 VWS: CPT

## 2019-02-22 ENCOUNTER — OFFICE VISIT (OUTPATIENT)
Dept: INTERNAL MEDICINE CLINIC | Age: 84
End: 2019-02-22

## 2019-02-22 VITALS
DIASTOLIC BLOOD PRESSURE: 70 MMHG | BODY MASS INDEX: 24.46 KG/M2 | OXYGEN SATURATION: 98 % | HEIGHT: 65 IN | SYSTOLIC BLOOD PRESSURE: 142 MMHG | TEMPERATURE: 98.2 F | HEART RATE: 92 BPM

## 2019-02-22 DIAGNOSIS — R20.2 PARESTHESIA: Primary | ICD-10-CM

## 2019-02-22 DIAGNOSIS — R04.0 EPISTAXIS: ICD-10-CM

## 2019-02-22 RX ORDER — DEXAMETHASONE 4 MG/1
TABLET ORAL
COMMUNITY
Start: 2017-06-28 | End: 2019-03-24 | Stop reason: ALTCHOICE

## 2019-02-22 RX ORDER — HYDROCHLOROTHIAZIDE 12.5 MG/1
CAPSULE ORAL
COMMUNITY
Start: 2017-02-28 | End: 2019-02-22 | Stop reason: SDUPTHER

## 2019-02-22 RX ORDER — CLOTRIMAZOLE AND BETAMETHASONE DIPROPIONATE 10; .64 MG/G; MG/G
CREAM TOPICAL
COMMUNITY
Start: 2017-02-17 | End: 2019-02-22 | Stop reason: SDUPTHER

## 2019-02-22 RX ORDER — FLUTICASONE PROPIONATE 50 MCG
SPRAY, SUSPENSION (ML) NASAL
COMMUNITY
Start: 2016-10-20 | End: 2019-03-24 | Stop reason: ALTCHOICE

## 2019-02-22 RX ORDER — CODEINE PHOSPHATE AND GUAIFENESIN 10; 100 MG/5ML; MG/5ML
5-10 SOLUTION ORAL
COMMUNITY
Start: 2018-03-13 | End: 2019-02-22 | Stop reason: SDUPTHER

## 2019-02-22 RX ORDER — DIAPER,BRIEF,ADULT, DISPOSABLE
500 EACH MISCELLANEOUS
COMMUNITY
End: 2019-02-25 | Stop reason: ALTCHOICE

## 2019-02-22 RX ORDER — LANOLIN ALCOHOL/MO/W.PET/CERES
1000 CREAM (GRAM) TOPICAL
COMMUNITY
End: 2019-02-22 | Stop reason: SDUPTHER

## 2019-02-22 RX ORDER — HYDROCODONE BITARTRATE AND ACETAMINOPHEN 5; 325 MG/1; MG/1
TABLET ORAL
COMMUNITY
Start: 2018-12-07 | End: 2019-03-24 | Stop reason: ALTCHOICE

## 2019-02-22 RX ORDER — ACYCLOVIR 200 MG/1
400 CAPSULE ORAL
COMMUNITY
End: 2019-02-22 | Stop reason: SDUPTHER

## 2019-02-22 RX ORDER — LENALIDOMIDE 25 MG/1
CAPSULE ORAL
COMMUNITY
Start: 2019-02-18 | End: 2019-10-07 | Stop reason: ALTCHOICE

## 2019-02-22 RX ORDER — OXYCODONE AND ACETAMINOPHEN 5; 325 MG/1; MG/1
TABLET ORAL
COMMUNITY
Start: 2012-02-10 | End: 2019-02-25 | Stop reason: ALTCHOICE

## 2019-02-22 RX ORDER — ONDANSETRON 8 MG/1
TABLET, ORALLY DISINTEGRATING ORAL
COMMUNITY
Start: 2019-02-20 | End: 2019-02-25 | Stop reason: ALTCHOICE

## 2019-02-22 RX ORDER — DIAZEPAM 2 MG/1
2 TABLET ORAL
COMMUNITY
End: 2019-03-22 | Stop reason: SDUPTHER

## 2019-02-22 RX ORDER — SERTRALINE HYDROCHLORIDE 50 MG/1
TABLET, FILM COATED ORAL
COMMUNITY
Start: 2017-01-18 | End: 2019-02-22 | Stop reason: SDUPTHER

## 2019-02-22 RX ORDER — SILVER SULFADIAZINE 10 G/1000G
CREAM TOPICAL
COMMUNITY
Start: 2017-03-22 | End: 2019-02-25 | Stop reason: ALTCHOICE

## 2019-02-22 RX ORDER — GABAPENTIN 100 MG/1
CAPSULE ORAL
Qty: 50 CAP | Refills: 0 | Status: SHIPPED | OUTPATIENT
Start: 2019-02-22 | End: 2019-07-15 | Stop reason: ALTCHOICE

## 2019-02-22 RX ORDER — HYDROCHLOROTHIAZIDE 25 MG/1
TABLET ORAL
COMMUNITY
End: 2019-02-25 | Stop reason: ALTCHOICE

## 2019-02-22 RX ORDER — ASPIRIN 81 MG/1
81 TABLET ORAL DAILY
COMMUNITY

## 2019-02-22 RX ORDER — MECLIZINE HCL 12.5 MG 12.5 MG/1
25 TABLET ORAL
COMMUNITY
Start: 2011-04-21 | End: 2019-02-25 | Stop reason: ALTCHOICE

## 2019-02-22 RX ORDER — FEXOFENADINE HCL 60 MG
60 TABLET ORAL
COMMUNITY
Start: 2011-11-11 | End: 2019-03-24 | Stop reason: ALTCHOICE

## 2019-02-22 NOTE — PROGRESS NOTES
Chief Complaint   Patient presents with    Epistaxis       Depression Screening:  3 most recent PHQ Screens 10/20/2016   Little interest or pleasure in doing things More than half the days   Feeling down, depressed, irritable, or hopeless More than half the days   Total Score PHQ 2 4   Trouble falling or staying asleep, or sleeping too much Nearly every day   Feeling tired or having little energy Nearly every day   Poor appetite, weight loss, or overeating Not at all   Feeling bad about yourself - or that you are a failure or have let yourself or your family down Not at all   Trouble concentrating on things such as school, work, reading, or watching TV Not at all   Moving or speaking so slowly that other people could have noticed; or the opposite being so fidgety that others notice Several days   Thoughts of being better off dead, or hurting yourself in some way Not at all   PHQ 9 Score 11   How difficult have these problems made it for you to do your work, take care of your home and get along with others Not difficult at all       Learning Assessment:  Learning Assessment 1/8/2016   PRIMARY LEARNER Patient   HIGHEST LEVEL OF EDUCATION - PRIMARY LEARNER  GRADUATED HIGH SCHOOL OR GED   BARRIERS PRIMARY LEARNER NONE   CO-LEARNER CAREGIVER No   PRIMARY LANGUAGE ENGLISH    NEED No   LEARNER PREFERENCE PRIMARY VIDEOS     DEMONSTRATION     READING     PICTURES   ANSWERED BY patient   RELATIONSHIP SELF       Abuse Screening:  No flowsheet data found. Fall Risk  Fall Risk Assessment, last 12 mths 10/26/2018   Able to walk? Yes   Fall in past 12 months? Yes   Fall with injury? No   Number of falls in past 12 months 1   Fall Risk Score 1             1. Have you been to the ER, urgent care clinic since your last visit? Hospitalized since your last visit? No    2. Have you seen or consulted any other health care providers outside of the [address] [place] [address] Daniele since your last visit?   Include any pap smears or colon screening.  No

## 2019-02-25 NOTE — PROGRESS NOTES
The patient presents to the office today with the chief complaint of tingling in her left leg    HPI    The patient ws doing well today but then 5 hours ago she developed a nose bleed. This stopped one hour ago. Approximately 1 hour ago the patient developed tingling in her left leg spreading down from her thigh into her calf. There was no associated weakness      Review of Systems   HENT: Positive for nosebleeds. Respiratory: Negative for shortness of breath. Cardiovascular: Negative for chest pain and leg swelling. Neurological: Positive for tingling. Allergies   Allergen Reactions    Penicillin V Potassium Diarrhea     States not allergic    Shellfish Derived Swelling       Current Outpatient Medications   Medication Sig Dispense Refill    REVLIMID 25 mg cap       HYDROcodone-acetaminophen (NORCO) 5-325 mg per tablet       ALPHA-D-GALACTOSIDASE PO Take 1 Tab by Mouth Take As Needed.  fexofenadine (ALLEGRA) 60 mg tablet 60 mg.      fluticasone (FLONASE) 50 mcg/actuation nasal spray       aspirin delayed-release 81 mg tablet 81 mg.      dexamethasone (DECADRON) 4 mg tablet       gabapentin (NEURONTIN) 100 mg capsule 1 cap three times per day for 2 weeks 50 Cap 0    hydroCHLOROthiazide (MICROZIDE) 12.5 mg capsule TAKE 1 CAPSULE BY MOUTH ONCE DAILY IN THE MORNING 90 Cap 0    clindamycin (CLEOCIN) 150 mg capsule       clotrimazole-betamethasone (LOTRISONE) topical cream APPLY TO AFFECTED AREA TWICE PER DAY 45 g 2    BORTEZOMIB (VELCADE INJECTION) by Injection route.  acyclovir (ZOVIRAX) 400 mg tablet Take 400 mg by mouth two (2) times a day.  acetaminophen (TYLENOL) 325 mg tablet Take  by mouth every four (4) hours as needed for Pain.  sertraline (ZOLOFT) 50 mg tablet TAKE ONE TABLET BY MOUTH ONCE DAILY 30 Tab 0    cyanocobalamin 1,000 mcg tablet Take 1,000 mcg by mouth daily.       diazepam (VALIUM) 5 mg tablet Take 5 mg by mouth every six (6) hours as needed for Anxiety.  cetirizine (ZYRTEC) 10 mg tablet Take  by mouth.  diazePAM (VALIUM) 2 mg tablet 2 mg.  predniSONE (DELTASONE) 20 mg tablet 4 tab daily x 2 days, 3 tab daily x 2 days, 2 tab daily x 4 days, 1 tab daily x 4 days 26 Tab 0       Past Medical History:   Diagnosis Date    Acute bronchitis     Acute upper respiratory infections of unspecified site     Cough     Essential hypertension, benign     Meniere's disease, unspecified     Muscle pain     Pain in limb        Past Surgical History:   Procedure Laterality Date    HX HYSTERECTOMY      HX MOHS PROCEDURES Left        Social History     Socioeconomic History    Marital status:      Spouse name: Not on file    Number of children: Not on file    Years of education: Not on file    Highest education level: Not on file   Social Needs    Financial resource strain: Not on file    Food insecurity - worry: Not on file    Food insecurity - inability: Not on file   [employer] needs - medical: Not on file   [employer] needs - non-medical: Not on file   Occupational History    Not on file   Tobacco Use    Smoking status: Never Smoker    Smokeless tobacco: Never Used   Substance and Sexual Activity    Alcohol use: No    Drug use: No    Sexual activity: Not Currently   Other Topics Concern    Not on file   Social History Narrative    Not on file       Patient does have an advanced directive on file    Visit Vitals  /70 (BP 1 Location: Left arm, BP Patient Position: Sitting)   Pulse 92   Temp 98.2 °F (36.8 °C) (Tympanic)   Ht 5' 5\" (1.651 m)   SpO2 98%   BMI 24.46 kg/m²       Physical Exam   HENT:   Left nares:  Dried blood   Cardiovascular: Normal rate and regular rhythm. Exam reveals no gallop. No murmur heard. Pulmonary/Chest: She has no wheezes. She has no rales. Abdominal: Soft. She exhibits no distension. There is no tenderness. Musculoskeletal: She exhibits no edema.    Neurological:   Neuro left leg: Normal       BMI:  OK    No visits with results within 3 Month(s) from this visit. Latest known visit with results is:   Hospital Outpatient Visit on 08/07/2017   Component Date Value Ref Range Status    RPR 08/07/2017 REACTIVE* NR   Final    T. pallidum Ab (FTA-Ab) 08/07/2017 Reactive* Non Reactive   Final    Comment: (NOTE)  Performed At: [address] [id number]  Robbin Dalal MD [id number]      RPR titer 08/07/2017 1:4   Final       .No results found for any visits on 02/22/19. Assessment / Plan      ICD-10-CM ICD-9-CM    1. Paresthesia R20.2 782.0    2. Epistaxis R04.0 784.7      Add Neurontin  she was advised to continue her maintenance medications  To call if either symptom persists over 4 days. Follow-up Disposition:  Return in about 3 months (around 5/22/2019). I asked Rianna Portillo. Deakrysten Pham if she has any questions and I answered the questions. Christal Santoyo  Deartitus Pham states that she understands the treatment plan and agrees with the treatment plan

## 2019-02-25 NOTE — PROGRESS NOTES
PT DAILY TREATMENT NOTE - [facility]  Patient Name: Virgie Price Date:2018 : 1931 [x]  Patient  Verified Payor: [place] MEDICARE / Plan: Johnson Barr y / Product Type: Medicare / In time: 12:32   Out time: 1:19 Total Treatment Time (min): 47 Visit #: 2 of 10 Medicare/BCBS Only Total Timed Codes (min):  37 1:1 Treatment Time:  32 Treatment Area: Pain in left shoulder [M25.512] Unspecified sprain of left shoulder joint, initial encounter [S43.402A] Other chronic pain [G89.29] SUBJECTIVE Pain Level (0-10 scale): 8-9 Any medication changes, allergies to medications, adverse drug reactions, diagnosis change, or new procedure performed?: [x] No    [] Yes (see summary sheet for update) Subjective functional status/changes:   [] No changes reported Pt reports that she has pain in the left shoulder with movement and when she lays onto her elbow. Pt states that she has tried her HEP exercises but has trouble \"keeping the towel on the wall\" with shoulder ABD/ER isometrics. OBJECTIVE Modality rationale: decrease pain and increase tissue extensibility to improve the patients ability to tolerate ADLs Min Type Additional Details  
 [] Estim:  []Unatt       []IFC  []Premod []Other:  []w/ice   []w/heat Position: Location:  
 [] Estim: []Att    []TENS instruct  []NMES []Other:  []w/US   []w/ice   []w/heat Position: Location:  
 []  Traction: [] Cervical       []Lumbar 
                     [] Prone          []Supine []Intermittent   []Continuous Lbs: 
[] before manual 
[] after manual  
 []  Ultrasound: []Continuous   [] Pulsed []1MHz   []3MHz Location: 
W/cm2:  
 []  Iontophoresis with dexamethasone Location: [] Take home patch  
[] In clinic  
10 []  Ice     [x]  heat 
[]  Ice massage 
[]  Laser  
[]  Anodyne Position: supine Location: left shoulder []  Laser with stim 
[]  Other: Position: Location:  
 []  Vasopneumatic Device Pressure:       [] lo [] med [] hi  
Temperature: [] lo [] med [] hi  
[] Skin assessment post-treatment:  []intact []redness- no adverse reaction 
  []redness  adverse reaction:  
22 min Therapeutic Exercise:  [x] See flow sheet :  
Rationale: increase ROM and increase strength to improve the patients ability to tolerate ADLs 15 min Manual Therapy:  Left shoulder long axis distraction, left inferior grade 1 and 4 GHJ mobs with ABD PROM, DTM left pec minor and bicep Rationale: decrease pain, increase ROM, increase tissue extensibility and decrease trigger points to improve reaching ability. With 
 [] TE 
 [] TA 
 [] neuro 
 [] other: Patient Education: [x] Review HEP [] Progressed/Changed HEP based on:  
[] positioning   [] body mechanics   [] transfers   [] heat/ice application   
[] other:   
 
Other Objective/Functional Measures: Initiated exercises/interventions in flow sheet. Tightness and tenderness noted in the left bicep and pec minor with manual interventions. Pain Level (0-10 scale) post treatment: 0 
 
ASSESSMENT/Changes in Function: Reported no pain in the left shoulder post session. Pt had difficulty initiating left shoulder flex AROM in supine after manual interventions secondary to pain. Improvement in this left shoulder flex AROM in supine after therapist helped initiate movement but still had pain with this movement. Educated and demonstrated left ABD/ER isometrics with a towel to improve understanding of HEP exericses. Educated pt to perform all exercises to tolerance. Continue POC as tolerated.   
 
Patient will continue to benefit from skilled PT services to modify and progress therapeutic interventions, address functional mobility deficits, address ROM deficits, address strength deficits, analyze and address soft tissue restrictions, analyze and cue movement patterns, analyze and modify body mechanics/ergonomics, assess and modify postural abnormalities and instruct in home and community integration to attain remaining goals. []  See Plan of Care 
[]  See progress note/recertification 
[]  See Discharge Summary Progress towards goals / Updated goals: 
Short Term Goals: To be accomplished in 2 weeks: 1. Pt will report compliance and independence to [facility] to help the pt manage their pain and symptoms. Eval: established Current: reports compliance 11/14/2018 Long Term Goals: To be accomplished in 5 weeks: 1. Pt will increase FOTO score to 69 points to improve ability to perform ADLs. Eval: 59 points 2. Pt will increase MMT left shoulder flex/ABD/ER to 4-/5 to improve ability to lift objects with the left UE with less pain. Eval: flex/ABD/ER 3+/5 (pain with flex/ABD) 3. Pt will increase AROM left shoulder flex to 140 degs, ABD to 120 degs, ER to 65 degs (all measured in sitting) to improve ease of dressing. Eval: flex 132 degs, ABD 94 deg, ER 57 degs 4. Pt will report being able to sleep throughout the night without awakening secondary to pain in the left UE/shoulder to improve sleep quality. Eval: reports awaking at night secondary to pain 5. Pt will report being able to lift a skillet with the left UE with minimal to no difficulty to improve ease of cooking tasks. Eval: unable without with right UE 
 
PLAN [x]  Upgrade activities as tolerated     [x]  Continue plan of care [x]  Update interventions per flow sheet      
[]  Discharge due to:_ 
[]  Other:_ Anushka Kirkland, PT 11/14/2018  12:52 PM 
 
Future Appointments Date Time Provider Roverto Meza 11/14/2018 12:30 PM Isabella Tapia, PT [facility]  
11/19/2018 10:00 AM Isabella Tapia PT [facility]  
11/21/2018 10:00 AM Isabella Tapia, PT [facility]  
11/26/2018  1:30 PM Marah Bryan, PT [facility]  
11/28/2018 12:30 PM Joon Smith, PT [facility]  
 
 
 Left Endoscopic Carpal and Cubital Tunnel release

## 2019-03-18 ENCOUNTER — PATIENT OUTREACH (OUTPATIENT)
Dept: INTERNAL MEDICINE CLINIC | Age: 84
End: 2019-03-18

## 2019-03-18 RX ORDER — IBUPROFEN 200 MG
400 TABLET ORAL
COMMUNITY
End: 2019-03-24 | Stop reason: ALTCHOICE

## 2019-03-18 RX ORDER — CYCLOBENZAPRINE HCL 5 MG
5 TABLET ORAL
COMMUNITY
End: 2019-07-15 | Stop reason: ALTCHOICE

## 2019-03-18 NOTE — PROGRESS NOTES
Hospital Discharge Follow-Up      Date/Time:  3/18/2019 4:14 PM    Patient was admitted to [facility] on 3/14/19 and discharged on 3/15/19 for neck pain, multiple myeloma. The physician discharge summary was available at the time of outreach. Patient was contacted within one business days of discharge. Spoke with daughter who stated the patient is still having pain in her neck and down to her shoulder. Patient is taking Flexeril and Motrin as newly prescribed medications. Daughter is aware of patient's appointment on 3/22/19 and will provide transportation. Top Challenges reviewed with the provider   Would like a referral to ortho         Method of communication with provider :email    Inpatient RRAT score: not available  Was this a readmission? no   Patient stated reason for the readmission: KRISTIAN    Nurse Navigator (NN) contacted the family by telephone to perform post hospital discharge assessment. Verified name and  with family as identifiers. Provided introduction to self, and explanation of the Nurse Navigator role. Reviewed discharge instructions and red flags with family who verbalized understanding. Family given an opportunity to ask questions and does not have any further questions or concerns at this time. The family agrees to contact the PCP office for questions related to their healthcare. NN provided contact information for future reference. Disease Specific:   N/A    Summary of patient's top problems:  1. Neck pain  2. Multiple myeloma  3.  Advanced age    [address] orders at discharge: [address]: KRISTIAN  Date of initial visit: NA    Durable Medical Equipment ordered/company: none  Durable Medical Equipment received: na    Barriers to care? none identified at this time    Advance Care Planning:   Does patient have an Advance Directive:  reviewed and current     Medication(s):   New Medications at Discharge: Flexeril, Motirn  Changed Medications at Discharge: none  Discontinued Medications at Discharge: Acyclovir    Medication reconciliation was performed with family, who verbalizes understanding of administration of home medications. There were no barriers to obtaining medications identified at this time. Referral to Pharm D needed: no     Current Outpatient Medications   Medication Sig    cyclobenzaprine (FLEXERIL) 5 mg tablet Take 5 mg by mouth every eight (8) hours as needed for Muscle Spasm(s).  ibuprofen (MOTRIN IB) 200 mg tablet Take 400 mg by mouth every six (6) hours as needed for Pain.  REVLIMID 25 mg cap     aspirin delayed-release 81 mg tablet 81 mg.    dexamethasone (DECADRON) 4 mg tablet     gabapentin (NEURONTIN) 100 mg capsule 1 cap three times per day for 2 weeks    hydroCHLOROthiazide (MICROZIDE) 12.5 mg capsule TAKE 1 CAPSULE BY MOUTH ONCE DAILY IN THE MORNING    acetaminophen (TYLENOL) 325 mg tablet Take  by mouth every four (4) hours as needed for Pain.  cyanocobalamin 1,000 mcg tablet Take 1,000 mcg by mouth daily.  cetirizine (ZYRTEC) 10 mg tablet Take  by mouth.  HYDROcodone-acetaminophen (NORCO) 5-325 mg per tablet     ALPHA-D-GALACTOSIDASE PO Take 1 Tab by Mouth Take As Needed.  fexofenadine (ALLEGRA) 60 mg tablet 60 mg.    fluticasone (FLONASE) 50 mcg/actuation nasal spray     diazePAM (VALIUM) 2 mg tablet 2 mg.  clindamycin (CLEOCIN) 150 mg capsule     predniSONE (DELTASONE) 20 mg tablet 4 tab daily x 2 days, 3 tab daily x 2 days, 2 tab daily x 4 days, 1 tab daily x 4 days    clotrimazole-betamethasone (LOTRISONE) topical cream APPLY TO AFFECTED AREA TWICE PER DAY    BORTEZOMIB (VELCADE INJECTION) by Injection route.  sertraline (ZOLOFT) 50 mg tablet TAKE ONE TABLET BY MOUTH ONCE DAILY    diazepam (VALIUM) 5 mg tablet Take 5 mg by mouth every six (6) hours as needed for Anxiety. No current facility-administered medications for this visit.         Medications Discontinued During This Encounter Medication Reason    acyclovir (ZOVIRAX) 400 mg tablet Discontinued at Discharge       [facility] follow up appointment(s):   Future Appointments   Date Time Provider Roverto Meza   3/22/2019  1:00 PM Olam Cushing, MD [facility] SCHED   3/28/2019  9:00 AM Valerie Parham PT [facility]      Non-[facility] follow up appointment(s): Dr. Melecio Tanner 3/29/19  [facility]:  n/a       Goals      Attends follow-up appointments as directed.       Plan:  Monitor for attendance at apts and assist as needed to schedule

## 2019-03-22 ENCOUNTER — PATIENT OUTREACH (OUTPATIENT)
Dept: FAMILY MEDICINE CLINIC | Facility: CLINIC | Age: 84
End: 2019-03-22

## 2019-03-22 ENCOUNTER — OFFICE VISIT (OUTPATIENT)
Dept: FAMILY MEDICINE CLINIC | Facility: CLINIC | Age: 84
End: 2019-03-22

## 2019-03-22 VITALS
TEMPERATURE: 97 F | HEART RATE: 66 BPM | WEIGHT: 150 LBS | SYSTOLIC BLOOD PRESSURE: 110 MMHG | DIASTOLIC BLOOD PRESSURE: 78 MMHG | OXYGEN SATURATION: 96 % | BODY MASS INDEX: 24.99 KG/M2 | HEIGHT: 65 IN | RESPIRATION RATE: 16 BRPM

## 2019-03-22 DIAGNOSIS — C90.00 MULTIPLE MYELOMA NOT HAVING ACHIEVED REMISSION (HCC): ICD-10-CM

## 2019-03-22 DIAGNOSIS — M25.512 ACUTE PAIN OF LEFT SHOULDER: Primary | ICD-10-CM

## 2019-03-22 DIAGNOSIS — I10 ESSENTIAL HYPERTENSION, BENIGN: ICD-10-CM

## 2019-03-22 DIAGNOSIS — H81.03 MENIERE'S DISEASE OF BOTH EARS: ICD-10-CM

## 2019-03-22 RX ORDER — HYDROCHLOROTHIAZIDE 12.5 MG/1
CAPSULE ORAL
Qty: 90 CAP | Refills: 0 | Status: SHIPPED | OUTPATIENT
Start: 2019-03-22 | End: 2019-07-01 | Stop reason: SDUPTHER

## 2019-03-22 RX ORDER — DIAZEPAM 2 MG/1
TABLET ORAL
Qty: 50 TAB | Refills: 1 | Status: SHIPPED | OUTPATIENT
Start: 2019-03-22 | End: 2019-07-15 | Stop reason: ALTCHOICE

## 2019-03-22 NOTE — PROGRESS NOTES
Hospital Discharge Follow-Up 
  
  
Date/Time:    3/18/2019 4:14 PM 
  
Patient was admitted to [facility] on 3/14/19 and discharged on 3/15/19 for neck pain, multiple myeloma. The patient attended an appointment with Dr. Tyshawn Langley today, 3/22/19. Valium was added to medications for dizziness and patient was referred to orthopedics. Goals Addressed This Visit's Progress  Attends follow-up appointments as directed. On track Plan:  Monitor for attendance at apts and assist as needed to schedule 3/22/19-attended apt with Dr. Tyshawn Langley  Knowledge and adherence of prescribed medication (ie. action, side effects, missed dose, etc.). On track Plan: Reconcile medications and assess patient understanding of purpose, how/when to take using the teach back technique 3/19/19-reviewed with daughter who verbalized understanding of purpose and how/when to give medication

## 2019-03-22 NOTE — PROGRESS NOTES
Chief Complaint Patient presents with  
[facility] Follow Up  
 Medication Refill Diazepam 2 mg

## 2019-03-24 NOTE — PROGRESS NOTES
The patient presents to the office today with a chief complain of left shoulder pain and for transition of care HPI: 
 
The patient was recently hospitalized at [facility] left neck - shoulder pain. The patient was discharged on 3/15/19 and contacted by Iona Chun on 3/18/19 for follow up. An appointment was made for the patient to see me today. The patient had the onset of muscle stiffness and pain on the left side of her neck. She also had left shoulder pain. In the ER the troponin level was slightly elevated which was felt not to be significant. In the hospital the patient was treated with NSAID, moist heat, physical therapy. She is doing better. The patient remains on Maxide for hypertension. She is tolerating the medication well. The patient remains on chemotherapy for multiple myeloma. The chemotherapy was recently changed to try to quiet the illness. Review of Systems Respiratory: Negative for shortness of breath. Cardiovascular: Negative for chest pain and leg swelling. Musculoskeletal: Positive for joint pain. Allergies Allergen Reactions  Penicillin V Potassium Diarrhea States not allergic  Shellfish Derived Swelling Current Outpatient Medications Medication Sig Dispense Refill  hydroCHLOROthiazide (MICROZIDE) 12.5 mg capsule TAKE 1 CAPSULE BY MOUTH ONCE DAILY IN THE MORNING 90 Cap 0  
 diazePAM (VALIUM) 2 mg tablet 1/2 to 1 tab every 8 hours as needed for dizziness 50 Tab 1  cyclobenzaprine (FLEXERIL) 5 mg tablet Take 5 mg by mouth every eight (8) hours as needed for Muscle Spasm(s).  REVLIMID 25 mg cap  aspirin delayed-release 81 mg tablet 81 mg.    
 gabapentin (NEURONTIN) 100 mg capsule 1 cap three times per day for 2 weeks 50 Cap 0  predniSONE (DELTASONE) 20 mg tablet 4 tab daily x 2 days, 3 tab daily x 2 days, 2 tab daily x 4 days, 1 tab daily x 4 days 26 Tab 0  
  clotrimazole-betamethasone (LOTRISONE) topical cream APPLY TO AFFECTED AREA TWICE PER DAY 45 g 2  
 acetaminophen (TYLENOL) 325 mg tablet Take  by mouth every four (4) hours as needed for Pain.  sertraline (ZOLOFT) 50 mg tablet TAKE ONE TABLET BY MOUTH ONCE DAILY 30 Tab 0  
 cyanocobalamin 1,000 mcg tablet Take 1,000 mcg by mouth daily.  diazepam (VALIUM) 5 mg tablet Take 5 mg by mouth every six (6) hours as needed for Anxiety. Past Medical History:  
Diagnosis Date  Acute bronchitis  Acute upper respiratory infections of unspecified site  Cough  Essential hypertension, benign  Meniere's disease, unspecified  Muscle pain  Pain in limb Past Surgical History:  
Procedure Laterality Date  HX HYSTERECTOMY  HX MOHS PROCEDURES Left Social History Socioeconomic History  Marital status:  Spouse name: Not on file  Number of children: Not on file  Years of education: Not on file  Highest education level: Not on file Occupational History  Not on file Social Needs  Financial resource strain: Not on file  Food insecurity:  
  Worry: Not on file Inability: Not on file  Transportation needs:  
  Medical: Not on file Non-medical: Not on file Tobacco Use  Smoking status: Never Smoker  Smokeless tobacco: Never Used Substance and Sexual Activity  Alcohol use: No  
 Drug use: No  
 Sexual activity: Not Currently Lifestyle  Physical activity:  
  Days per week: Not on file Minutes per session: Not on file  Stress: Not on file Relationships  Social connections:  
  Talks on phone: Not on file Gets together: Not on file Attends Anabaptism service: Not on file Active member of club or organization: Not on file Attends meetings of clubs or organizations: Not on file Relationship status: Not on file  Intimate partner violence: Fear of current or ex partner: Not on file Emotionally abused: Not on file Physically abused: Not on file Forced sexual activity: Not on file Other Topics Concern  Not on file Social History Narrative  Not on file Patient does have an advanced directive on file Visit Vitals /78 Pulse 66 Temp 97 °F (36.1 °C) (Tympanic) Resp 16 Ht 5' 5\" (1.651 m) Wt 150 lb (68 kg) SpO2 96% BMI 24.96 kg/m² Physical Exam  
Neck: Carotid bruit is not present. Cardiovascular: Normal rate and regular rhythm. Exam reveals no gallop. No murmur heard. Pulmonary/Chest: She has no wheezes. She has no rales. Abdominal: Soft. Bowel sounds are normal. She exhibits no distension and no mass. There is no tenderness. Musculoskeletal: The left shoulder has fair range of motion with pain at the extreme Tight muscles base of the neck on the left side BMI:  OK No visits with results within 3 Month(s) from this visit. Latest known visit with results is:  
Hospital Outpatient Visit on 08/07/2017 Component Date Value Ref Range Status  RPR 08/07/2017 REACTIVE* NR   Final  
 T. pallidum Ab (FTA-Ab) 08/07/2017 Reactive* Non Reactive   Final  
 Comment: (NOTE) Performed At: [address] [id number] Yosvany Sanchez MD RD:[id number]  RPR titer 08/07/2017 1:4   Final  
 
 
.No results found for any visits on 03/22/19. Assessment / Plan: ICD-10-CM ICD-9-CM 1. Acute pain of left shoulder M25.512 719.41 REFERRAL TO ORTHOPEDICS 2. Meniere's disease of both ears H81.03 386.00 diazePAM (VALIUM) 2 mg tablet 3. Essential hypertension, benign I10 401.1 4. Multiple myeloma not having achieved remission (HCC) C90.00 203.00 Medications were reconciled with the patient and the hospital record during this visit 
she was advised to continue her maintenance medications Moist Heat as needed Recheck in 4 months I asked Tanna Tran. Eloy Riedel if she has any questions and I answered the questions. Rockey Bailey Eloy Riedel states that she understands the treatment plan and agrees with the treatment plan

## 2019-03-27 NOTE — PROGRESS NOTES
Orthopedics Orthopedics The patient presents to the office today with the chief complaint of chest congestion    HPI    The patient complains of chest congestion with cough. The cough is non productive. The patient denies fever but she has several episodes of night sweats. The patient denies dyspnea. The patient is on chemotherapy for multiple myeloma with a good response. The patient has interstital scarring on previous chest xrays. A chest xray yesterday showed the same finding - perhaps slightly worse      Review of Systems   Respiratory: Positive for cough. Negative for shortness of breath. Cardiovascular: Negative for chest pain and leg swelling. Allergies   Allergen Reactions    Penicillin V Potassium Diarrhea     States not allergic    Shellfish Derived Swelling       Current Outpatient Prescriptions   Medication Sig Dispense Refill    cefUROXime (CEFTIN) 500 mg tablet 1 tablet twice per day 20 Tab 0    GUAIFENESIN/DEXTROMETHORPHAN (TUSSIN DM COUGH PO) Take  by mouth.  hydroCHLOROthiazide (MICROZIDE) 12.5 mg capsule TAKE ONE CAPSULE BY MOUTH ONCE DAILY IN THE MORNING 90 Cap 0    aspirin delayed-release 81 mg tablet Take  by mouth daily.  BORTEZOMIB (VELCADE INJECTION) by Injection route.  LUTEIN PO Take  by mouth.  acyclovir (ZOVIRAX) 400 mg tablet Take 400 mg by mouth every four (4) hours (while awake).  tiotropium bromide (SPIRIVA RESPIMAT) 1.25 mcg/actuation inhaler Take 2 Puffs by inhalation daily. 3 Inhaler 3    acetaminophen (TYLENOL) 325 mg tablet Take  by mouth every four (4) hours as needed for Pain.  silver sulfADIAZINE (SILVADENE) 1 % topical cream Apply twice per day 50 g 1    naproxen sodium (ALEVE) 220 mg cap Take  by mouth.       clotrimazole-betamethasone (LOTRISONE) topical cream APPLY TO AFFECTED AREA TWICE PER DAY 45 g 2    sertraline (ZOLOFT) 50 mg tablet TAKE ONE TABLET BY MOUTH ONCE DAILY 30 Tab 0    cyanocobalamin 1,000 mcg tablet Take 1,000 mcg by mouth Internal Medicine daily.      fluticasone (FLONASE) 50 mcg/actuation nasal spray 1 spray each nostril daily 1 Bottle 1    diazepam (VALIUM) 5 mg tablet Take 5 mg by mouth every six (6) hours as needed for Anxiety.  cetirizine (ZYRTEC) 10 mg tablet Take  by mouth. Past Medical History:   Diagnosis Date    Acute bronchitis     Acute upper respiratory infections of unspecified site     Cough     Essential hypertension, benign     Meniere's disease, unspecified     Muscle pain     Pain in limb        Past Surgical History:   Procedure Laterality Date    HX HYSTERECTOMY      HX MOHS PROCEDURES Left        Social History     Social History    Marital status:      Spouse name: N/A    Number of children: N/A    Years of education: N/A     Occupational History    Not on file. Social History Main Topics    Smoking status: Never Smoker    Smokeless tobacco: Never Used    Alcohol use No    Drug use: No    Sexual activity: Not Currently     Other Topics Concern    Not on file     Social History Narrative       Patient does have an advanced directive on file    Visit Vitals    /64 (BP 1 Location: Left arm, BP Patient Position: Sitting)    Pulse 80    Temp 98.2 °F (36.8 °C) (Tympanic)    Resp 20    Ht 5' 5\" (1.651 m)    Wt 143 lb (64.9 kg)    SpO2 99%    BMI 23.8 kg/m2       Physical Exam   Cardiovascular: Exam reveals no gallop. No murmur heard. Pulmonary/Chest: She has no wheezes. She has rales (dry rales through out all lung fields). Musculoskeletal: She exhibits no edema. BMI:  OK    No visits with results within 3 Month(s) from this visit.   Latest known visit with results is:    Hospital Outpatient Visit on 08/07/2017   Component Date Value Ref Range Status    RPR 08/07/2017 REACTIVE* NR   Final    T. pallidum Ab (FTA-Ab) 08/07/2017 Reactive* Non Reactive   Final    Comment: (NOTE)  Performed At: [address] [id number]  Nayeli Matthews Lisa Morataya MD VW:[id number]      RPR titer 08/07/2017 1:4   Final       .No results found for any visits on 12/20/17. Assessment / Plan      ICD-10-CM ICD-9-CM    1. ILD (interstitial lung disease) ([facility] J84.9 515    2. Bronchitis J40 490    3. Multiple myeloma in remission ([facility] C90.01 203.01        Will use Ceftin - chart lists an allergy to penicillin but this was GI intolerance and the patient has tolerated IV penicillin recently  If not a prompt response -- penicillin    Follow-up Disposition:  Return in about 3 months (around 3/20/2018). I asked Bailey Roland. Loren Garvey if she has any questions and I answered the questions. Dontae Garvey states that she understands the treatment plan and agrees with the treatment plan

## 2019-03-28 ENCOUNTER — APPOINTMENT (OUTPATIENT)
Dept: PHYSICAL THERAPY | Age: 84
End: 2019-03-28

## 2019-04-01 ENCOUNTER — HOSPITAL ENCOUNTER (OUTPATIENT)
Dept: PHYSICAL THERAPY | Age: 84
Discharge: HOME | End: 2019-04-01
Payer: MEDICARE

## 2019-04-01 PROCEDURE — 97110 THERAPEUTIC EXERCISES: CPT | Performed by: PHYSICAL THERAPIST

## 2019-04-01 PROCEDURE — 97161 PT EVAL LOW COMPLEX 20 MIN: CPT | Performed by: PHYSICAL THERAPIST

## 2019-04-01 PROCEDURE — 97530 THERAPEUTIC ACTIVITIES: CPT | Performed by: PHYSICAL THERAPIST

## 2019-04-01 PROCEDURE — 97112 NEUROMUSCULAR REEDUCATION: CPT | Performed by: PHYSICAL THERAPIST

## 2019-04-01 NOTE — PROGRESS NOTES
[address], [address] [place], [facility] [address] 
[phone] fax Plan of Care/ Statement of Necessity for Physical Therapy Services Patient name: Misti Deras Start of Care: 2019 Referral source: Prudencio Ledbetter NP : 1931 Medical Diagnosis: Neck pain on left side [M54.2] Payor: Heber Freeman / Plan: [address] / Product Type: Medicare /  Onset Date:3/1/19 Treatment Diagnosis: L Shoulder pain, Joint stiffness, weakness, impaired posture, falls risk, repeated falls Prior Hospitalization: see medical history Provider#: [id number] Medications: Verified on Patient summary List  
 Comorbidities: Cancer, OA, visual and hearing impairments Prior Level of Function: Was able to perform household chores and gardening as much and as long as she wanted to without pain, falling, or instability. The Plan of Care and following information is based on the information from the initial evaluation. Assessment/ key information: Patient is a pleasant older adult female with insidious onset of L shoulder pain which appears to be sub-acute exacerbation of chronic L shoulder pain. Special testing performed today suggests L Shoulder impingement syndrome in the presence of poor posture, limited cardiovascular activity, elevated fear avoidance belief factor, and fear of falling. Treatment will focus on falls preparedness and transfer training per patient request, UE strengthening and balance with end range with improved ability to reach to high and low surfaces.   
Evaluation Complexity History MEDIUM  Complexity : 1-2 comorbidities / personal factors will impact the outcome/ POC ; Examination LOW Complexity : 1-2 Standardized tests and measures addressing body structure, function, activity limitation and / or participation in recreation  ;Presentation LOW Complexity : Stable, uncomplicated  ;Clinical Decision Making MEDIUM Complexity : FOTO score of 26-74 Overall Complexity Rating: LOW Problem List: pain affecting function, decrease ROM, decrease strength, edema affecting function, impaired gait/ balance, decrease ADL/ functional abilitiies, decrease activity tolerance, decrease flexibility/ joint mobility, decrease transfer abilities and other floor Transfers Treatment Plan may include any combination of the following: Therapeutic exercise, Therapeutic activities, Neuromuscular re-education, Physical agent/modality, Gait/balance training, Manual therapy, Patient education, Functional mobility training, Home safety training, Stair training and Other: home exercise program 
Patient / Family readiness to learn indicated by: asking questions, trying to perform skills and interest 
Persons(s) to be included in education: patient (P) Barriers to Learning/Limitations: None Patient Goal (s): I would like to be able to reach the bottom shelf and take things down off the top shelf of the shoulder.  Patient Self Reported Health Status: good Rehabilitation Potential: good Short Term Goals: To be accomplished in 6 treatments: 1. Patient will decrease shoulder pain during aggravating activities by 2 points on Verbal Analog Scale (Eval: 10/10) to increase participation on Activities of Daily Living as lifting, carrying, and reaching. 2. Patient will demonstrate increased strength by ½ grade on MMT in B UEs (Eval: 3-/5) to improve functional participation in such tasks as prolonged standing and sitting. 3.   Patient will demonstrate ability to perform deep squat in preparation for reaching to low surfaces (Eval: unable). Long Term Goals: To be accomplished in 12 treatments: 4. Patient will decrease pain during aggravating activities by 4 points on Verbal Analog Scale (Eval: 10/10) to increase participation on Activities of Daily Living such as ifting, carrying, and reaching. 5. Patient will demonstrate increased strength by 1 grade on MMT in B UEs (Eval:3-/5) to improve functional participation in such tasks as working overhead for >2 min. 6. Patient will demonstrate ability to reach to low surfaces, lift item, and stand up, as well as lift 10lbs down from overhead shelf (Eval:unable). 7. Patient will achieve predicted FOTO scores (63, eval 59) to demonstrate decreased functional impairment to facilitate improved participation in activities of daily living, improve overall quality of life. Frequency / Duration: Patient to be seen 2-3 times per week for 12 treatments. Patient/ Caregiver education and instruction: Diagnosis, prognosis, activity modification, brace/ splint application, exercises and other home exercise program 
 [x]  Plan of care has been reviewed with HARIS Mcadams, PT 4/1/2019 11:48 AM 
________________________________________________________________________ I certify that the above Therapy Services are being furnished while the patient is under my care. I agree with the treatment plan and certify that this therapy is necessary. [de-identified] Signature:____________Date:_________TIME:________ 
 
[employer], Date and Time must be completed for valid certification ** Please sign and return to In [address] [place] [address], [address] [place], [address] [place] [address],[address] 
[phone] fax

## 2019-04-01 NOTE — PROGRESS NOTES
PT DAILY TREATMENT NOTE/SHOULDER EVAL 10-18 Patient Name: Lisette Guzman Date:2019 : 1931 [x]  Patient  Verified Payor: [facility] MEDICARE / Plan: Johnson Barr y / Product Type: Medicare / In time:11:23A  Out time:12:15P Total Treatment Time (min): 53 Visit #: 1 of 12 Medicare/BCBS Only Total Timed Codes (min):  53 1:1 Treatment Time:  48 Treatment Area: Neck pain on left side [M54.2] SUBJECTIVE Pain Level (0-10 scale): 3/10 []constant []intermittent [x]improving []worsening []no change since onset Any medication changes, allergies to medications, adverse drug reactions, diagnosis change, or new procedure performed?: [x] No    [] Yes (see summary sheet for update) Subjective functional status/changes:    
PLOF: Was able to perform household chores and gardening as much and as long as she wanted to without pain, falling, or instability. Limitations to PLOF: Pain and fear of falling. Mechanism of Injury: Insidious onset about 1 month ago. Also received treatment in November at this clinic for the same shoulder. Reports she stopped doing her HEP consistently about a month after her last therapy discharge. Reports she had the worse back pain ever experienced last Friday after a long trip to [address] [facility]. Current symptoms/Complaints: Aggravating factors: 1) Reaching under the bottom cabinets - unable to perform, 2) Vacuuming/household chores for > 20 min causes 10/10 pain. Previous Treatment/Compliance: Very compliant - taking a strong medication for cancer PMHx/Surgical Hx: Cancer, OA, visual and hearing impairments. Work Hx: Retired Living Situation: Lives with daughter and son in law in \"in-law suite\" with 4 RADHA a one-story area with bilateral handrails Pt Goals: \"I would like to be able to reach the bottom shelf and take things down off the top shelf of the shoulder. \" 
Barriers: []pain []financial []time []transportation []other Motivation: [place] Blair Substance use: []Alcohol []Tobacco []other:  
FABQ Score: []low []elevate Cognition: A & O x 4    Other: OBJECTIVE/EXAMINATION Domestic Life: Good Activity/Recreational Limitations: None Mobility: Good Self Care: I with ADLS 
 
 
17 min [x]Eval                  []Re-Eval  
 
 
12 min Therapeutic Exercise:  [x] See flow sheet :  
Rationale: increase ROM and increase strength to improve the patients ability to improve A/ROM and decrease pain with movement. 15 min Therapeutic Activity:  [x]  See flow sheet :  
Rationale: increase strength and improve coordination  to improve the patients ability to improve the patients ability perform basic ADLs and job-related tasks without pain. 16 min Neuromuscular Re-education:  [x]  See flow sheet :  
Rationale: improve coordination, improve balance and increase proprioception  to improve the patients ability to to improve the patients ability to perform activities with good form and proprioception with tactile and verbal cuing appropriately. With 
 [x] TE 
 [x] TA [x] neuro 
 [] other: Patient Education: [x] Review HEP [x] Progressed/Changed HEP based on:  
[x] positioning   [] body mechanics   [] transfers   [] heat/ice application   
[] other:   
 
Other Objective/Functional Measures:  
 
Timed Sit/Stand Test: 10 reps BP: 112/54 mmHg HR: 78bpm 
SpO2: 96% Physical Therapy Evaluation - Shoulder Posture: [x] Poor    [] Fair    [] Good    Describe: severe kyphosis, limited to no lumbar lordosis, presence of dowager's hump ROM:  [] Unable to assess at this time AROM                                                              PROM Left Right  Left Right Flexion 130 140 Flexion 140 150 Extension 60 60 Extension Scaption/ABD 90 110 Scaptin/ 120 ER @ 0 Degrees   ER @ 0 Degrees ER @ 90 Degrees   ER @ 90 Degrees IR @ 90 Degrees   IR @ 90 Degrees End Feel / Painful Arc: 
 
Strength:   [] Unable to assess at this time L (1-5) R (1-5) Pain Flexors 3- 3- [] Yes   [] No  
Abductors 3- 3- [] Yes   [] No  
External Rotators 3- 3- [] Yes   [] No  
Internal Rotators 3- 3- [] Yes   [] No  
Supraspinatus 3- 3- [] Yes   [] No  
Serratus Anterior   [] Yes   [] No  
Lower Trapezius   [] Yes   [] No  
Elbow Flexion 3+ 3+ [] Yes   [] No  
Elbow Extension 3+ 3+ [] Yes   [] No  
 
 
Scapulohumoral Control / Rhythm: 
Able to eccentrically lower with good control? Left: [] Yes   [x] No     Right: [] Yes   [x] No 
 
Accessory Motions: 
 
Palpation 
[x] Min  [] Mod  [] Severe    Location: anterior L shoulder joint [] Min  [] Mod  [] Severe    Location: 
[] Min  [] Mod  [] Severe    Location: 
 
Optional Tests:   
Sensation Left Right Reflexes Left Right Biceps (C5) 1+ 1+ Biceps (C5) 1+ 1+ Hale Radial(C6-7)   Brachioradialis (C6) 1+ 1+ Hale Ulnar(C8-T1)   Triceps (C7) 1+ 1+ Adson's Test  [] Pos   [] Neg Yergason's Test [] Pos   [] Neg 
Shanice's Test  [] Pos   [] Neg [place]'s Sign [] Pos   [] Neg Neer's Test  [x] Pos   [] Neg L Clunk Test  [] Pos   [] Neg 
Hawkin's Test  [] Pos   [x] Neg L AC Joint  [] Pos   [] Neg 
Speed's Test  [] Pos   [x] Neg SC Joint  [] Pos   [] Neg 
Empty Can  [x] Pos   [] Neg Pectoral Tightness [] Pos   [] Neg Anterior Apprehension [] Pos   [] Neg  
Posterior Apprehension [] Pos   [] Neg Other Tests / Comments:  
 Drop Arm Test: Neg on L Pain Level (0-10 scale) post treatment: 1-2/10 ASSESSMENT/Changes in Function: Patient is a pleasant older adult female with insidious onset of L shoulder pain which appears to be sub-acute exacerbation of chronic L shoulder pain.  Special testing performed today suggests L Shoulder impingement syndrome in the presence of poor posture, limited cardiovascular activity, elevated fear avoidance belief factor, and fear of falling. Treatment will focus on falls preparedness and transfer training per patient request, UE strengthening and balance with end range with improved ability to reach to high and low surfaces. Patient will continue to benefit from skilled PT services to modify and progress therapeutic interventions, address functional mobility deficits, address ROM deficits, address strength deficits, analyze and address soft tissue restrictions, analyze and cue movement patterns, analyze and modify body mechanics/ergonomics and assess and modify postural abnormalities to attain remaining goals. [x]  See Plan of Care 
[]  See progress note/recertification 
[]  See Discharge Summary Progress towards goals / Updated goals: 
Short Term Goals: To be accomplished in 6 treatments: 1. Patient will decrease shoulder pain during aggravating activities by 2 points on Verbal Analog Scale (Eval: 10/10) to increase participation on Activities of Daily Living as lifting, carrying, and reaching. 2. Patient will demonstrate increased strength by ½ grade on MMT in B UEs (Eval: 3-/5) to improve functional participation in such tasks as prolonged standing and sitting.  
      3.   Patient will demonstrate ability to perform deep squat in preparation for reaching to low surfaces (Eval: unable).   
  
Long Term Goals: To be accomplished in 12 treatments: 4. Patient will decrease pain during aggravating activities by 4 points on Verbal Analog Scale (Eval: 10/10) to increase participation on Activities of Daily Living such as ifting, carrying, and reaching. 5. Patient will demonstrate increased strength by 1 grade on MMT in B UEs (Eval:3-/5) to improve functional participation in such tasks as working overhead for >2 min. 6. Patient will demonstrate ability to reach to low surfaces, lift item, and stand up, as well as lift 10lbs down from overhead shelf (Eval:unable).   
       3. Patient will achieve predicted FOTO scores (63, eval 59) to demonstrate decreased functional impairment to facilitate improved participation in activities of daily living, improve overall quality of life PLAN [x]  Upgrade activities as tolerated     []  Continue plan of care 
[]  Update interventions per flow sheet      
[]  Discharge due to:_ 
[]  Other:_ Nilda Mcadams, PT 4/1/2019  11:49 AM

## 2019-04-03 ENCOUNTER — HOSPITAL ENCOUNTER (OUTPATIENT)
Dept: PHYSICAL THERAPY | Age: 84
Discharge: HOME | End: 2019-04-03
Payer: MEDICARE

## 2019-04-03 PROCEDURE — 97110 THERAPEUTIC EXERCISES: CPT

## 2019-04-03 NOTE — PROGRESS NOTES
PT DAILY TREATMENT NOTE/SHOULDER EVAL 10-18 Patient Name: Quyen Torres Date:4/3/2019 : 1931 [x]  Patient  Verified Payor: [place] MED[place] / Plan: Johnson Barr y / Product Type: Medicare / In time:12:08  Out time: 12:38 Total Treatment Time (min): 30 Visit #: 2 of 12 Medicare/BCBS Only Total Timed Codes (min):  30 1:1 Treatment Time: 30 Treatment Area: Left shoulder pain [M25.512] At risk for falls [Z91.81] SUBJECTIVE Pain Level (0-10 scale): 0/10 []constant []intermittent []improving []worsening []no change since onset Any medication changes, allergies to medications, adverse drug reactions, diagnosis change, or new procedure performed?: [x] No    [] Yes (see summary sheet for update) Subjective functional status/changes:    
I have no pain now but sometimes at night it hurts. OBJECTIVE/EXAMINATION Modality rationale: NI Min Type Additional Details  
 [] Estim:  []Unatt       []IFC  []Premod []Other:  []w/ice   []w/heat Position: Location:  
 [] Estim: []Att    []TENS instruct  []NMES []Other:  []w/US   []w/ice   []w/heat Position: Location:  
 []  Traction: [] Cervical       []Lumbar 
                     [] Prone          []Supine []Intermittent   []Continuous Lbs: 
[] before manual 
[] after manual  
 []  Ultrasound: []Continuous   [] Pulsed []1MHz   []3MHz Location: 
W/cm2:  
 []  Iontophoresis with dexamethasone Location: [] Take home patch  
[] In clinic  
 []  Ice     []  heat 
[]  Ice massage 
[]  Laser  
[]  Anodyne Position: Location:  
 []  Laser with stim 
[]  Other: Position: Location:  
 []  Vasopneumatic Device Pressure:       [] lo [] med [] hi  
Temperature: [] lo [] med [] hi  
[] Skin assessment post-treatment:  []intact []redness- no adverse reaction 
  []redness  adverse reaction:  
 
 min []Eval                  []Re-Eval  
 
 
 30 min Therapeutic Exercise:  [] See flow sheet :  
Rationale: increase ROM and increase strength to improve the patients ability to perform ADLs With 
 [x] TE 
 [] TA 
 [] neuro 
 [] other: Patient Education: [x] Review HEP [] Progressed/Changed HEP based on:  
[] positioning   [] body mechanics   [] transfers   [] heat/ice application   
[] other:   
 
Other Objective/Functional Measures:  
- SLS with 1UE support only - Demo and cuing 100% of time Pain Level (0-10 scale) post treatment: 0/10 ASSESSMENT/Changes in Function:  
Initiated therex today per flow sheet, requiring vc and demo 100% of the time for proper form and technique. Patient will continue to benefit from skilled PT services to modify and progress therapeutic interventions, address functional mobility deficits, address ROM deficits, address strength deficits, analyze and address soft tissue restrictions, analyze and cue movement patterns, analyze and modify body mechanics/ergonomics and assess and modify postural abnormalities to attain remaining goals. [x]  See Plan of Care 
[]  See progress note/recertification 
[]  See Discharge Summary Progress towards goals / Updated goals: 
Short Term Goals: To be accomplished in 6 treatments: 1. Patient will decrease shoulder pain during aggravating activities by 2 points on Verbal Analog Scale (Eval: 10/10) to increase participation on Activities of Daily Living as lifting, carrying, and reaching. 2. Patient will demonstrate increased strength by ½ grade on MMT in B UEs (Eval: 3-/5) to improve functional participation in such tasks as prolonged standing and sitting.  
      3.   Patient will demonstrate ability to perform deep squat in preparation for reaching to low surfaces (Eval: unable).   
  
Long Term Goals: To be accomplished in 12 treatments: 4.  Patient will decrease pain during aggravating activities by 4 points on Verbal Analog Scale (Eval: 10/10) to increase participation on Activities of Daily Living such as ifting, carrying, and reaching. 5. Patient will demonstrate increased strength by 1 grade on MMT in B UEs (Eval:3-/5) to improve functional participation in such tasks as working overhead for >2 min. 6. Patient will demonstrate ability to reach to low surfaces, lift item, and stand up, as well as lift 10lbs down from overhead shelf (Eval:unable).   
      7. Patient will achieve predicted FOTO scores (63, eval 59) to demonstrate decreased functional impairment to facilitate improved participation in activities of daily living, improve overall quality of life.  
 
PLAN 
[]  Upgrade activities as tolerated     [x]  Continue plan of care 
[]  Update interventions per flow sheet      
[]  Discharge due to:_ 
[]  Other:_   
 
SENIA Gallegos 4/3/2019  12:38 AM

## 2019-04-05 ENCOUNTER — HOSPITAL ENCOUNTER (OUTPATIENT)
Dept: PHYSICAL THERAPY | Age: 84
Discharge: HOME | End: 2019-04-05
Payer: MEDICARE

## 2019-04-05 PROCEDURE — 97530 THERAPEUTIC ACTIVITIES: CPT | Performed by: PHYSICAL THERAPIST

## 2019-04-05 PROCEDURE — 97112 NEUROMUSCULAR REEDUCATION: CPT | Performed by: PHYSICAL THERAPIST

## 2019-04-05 PROCEDURE — 97110 THERAPEUTIC EXERCISES: CPT | Performed by: PHYSICAL THERAPIST

## 2019-04-05 NOTE — PROGRESS NOTES
PT DAILY TREATMENT NOTE 10-18 Patient Name: Anton Hoskins Date:2019 : 1931 [x]  Patient  Verified Payor: [place] MEDICARE / Plan: Johnson Barr waldo / Product Type: Medicare / In time:3:58P  Out time:4:40P Total Treatment Time (min): 31min Visit #: 3 of 12 Medicare/BCBS Only Total Timed Codes (min):  42 1:1 Treatment Time:  42 Treatment Area: Left shoulder pain [M25.512] At risk for falls [Z91.81] SUBJECTIVE Pain Level (0-10 scale): 3/10 Any medication changes, allergies to medications, adverse drug reactions, diagnosis change, or new procedure performed?: [x] No    [] Yes (see summary sheet for update) Subjective functional status/changes:   [] No changes reported Patient states she is doing more with no increase in pain in the L shoulder, but also seems to report favoring it as well. OBJECTIVE 15 min Therapeutic Exercise:  [x] See flow sheet :  
Rationale: increase ROM and increase strength to improve the patients ability to improve A/ROM and decrease pain with movement. 15 min Therapeutic Activity:  [x]  See flow sheet :  
Rationale: increase strength and improve coordination  to improve the patients ability to improve the patients ability perform basic ADLs and job-related tasks without pain. 12 min Neuromuscular Re-education:  [x]  See flow sheet :  
Rationale: improve coordination, improve balance and increase proprioception  to improve the patients ability to increase biomechanical feedback with improved posture. With 
 [x] TE 
 [x] TA [x] neuro 
 [] other: Patient Education: [x] Review HEP [x] Progressed/Changed HEP based on:  
[x] positioning   [x] body mechanics   [] transfers   [] heat/ice application   
[] other:   
 
Other Objective/Functional Measures: RPE up to 6-7/10 with floor transfer scaled activities, HR up to 102 bpm  
 
Pain Level (0-10 scale) post treatment: 2/10 ASSESSMENT/Changes in Function: Patient appears to be making some progress towards goals, but needs consistent, continuous education on increase movement, purpose of exercises, body's response to pain, etc.  
 
Patient will continue to benefit from skilled PT services to modify and progress therapeutic interventions, address functional mobility deficits, address ROM deficits, address strength deficits, analyze and address soft tissue restrictions, analyze and cue movement patterns, analyze and modify body mechanics/ergonomics and address imbalance/dizziness to attain remaining goals. [x]  See Plan of Care 
[]  See progress note/recertification 
[]  See Discharge Summary Progress towards goals / Updated goals: 
Short Term Goals: To be accomplished in 6 treatments: 1. Patient will decrease shoulder pain during aggravating activities by 2 points on Verbal Analog Scale (Eval: 10/10) to increase participation on Activities of Daily Living as lifting, carrying, and reaching. MET at 3/10 4/5/19 2. Patient will demonstrate increased strength by ½ grade on MMT in B UEs (Eval: 3-/5) to improve functional participation in such tasks as prolonged standing and sitting.  
      3.   Patient will demonstrate ability to perform deep squat in preparation for reaching to low surfaces (Eval: unable).  Progressing 4/5/19 - able to touch the floor and perform a half squat.  
  
Long Term Goals: To be accomplished in 12 treatments: 4. Patient will decrease pain during aggravating activities by 4 points on Verbal Analog Scale (Eval: 10/10) to increase participation on Activities of Daily Living such as ifting, carrying, and reaching. 5. Patient will demonstrate increased strength by 1 grade on MMT in B UEs (Eval:3-/5) to improve functional participation in such tasks as working overhead for >2 min.   
6. Patient will demonstrate ability to reach to low surfaces, lift item, and stand up, as well as lift 10lbs down from overhead shelf (Eval:unable).   
      7. Patient will achieve predicted FOTO scores (63, eval 59) to demonstrate decreased functional impairment to facilitate improved participation in activities of daily living, improve overall quality of life PLAN [x]  Upgrade activities as tolerated     []  Continue plan of care 
[]  Update interventions per flow sheet      
[]  Discharge due to:_ 
[]  Other:_ Geetha Mcadams, PT 4/5/2019  5:10 PM 
 
Future Appointments Date Time Provider Roverto Meza 4/8/2019  2:00 PM Reyes Greaser, PT [facility]  
4/10/2019  9:00 AM Reyes Greaser, PT [facility]  
4/11/2019  3:00 PM Guerda Avalos, PT [facility]  
4/15/2019  1:00 PM Guerda Avalos, PT [facility]  
4/17/2019 11:30 AM Guerda Avalos, PT [facility]  
4/18/2019 11:30 AM Reyes Francesca, PT [facility]  
4/22/2019  1:30 PM Guerda Avalos, PT [facility]  
4/24/2019 11:30 AM Guerda Avalos, PT [facility]  
4/25/2019 11:00 AM Reyes Francesca, PT [facility]  
4/29/2019  1:30 PM Reyes Francesca, PT [facility]  
5/1/2019 12:00 PM Reyes Greaser, PT [facility]  
5/2/2019 11:30 AM Reyes Francesca, PT [facility]  
5/23/2019  1:15 PM [facility] [address]

## 2019-04-08 ENCOUNTER — HOSPITAL ENCOUNTER (OUTPATIENT)
Dept: PHYSICAL THERAPY | Age: 84
Discharge: HOME | End: 2019-04-08
Payer: MEDICARE

## 2019-04-08 PROCEDURE — 97110 THERAPEUTIC EXERCISES: CPT

## 2019-04-08 PROCEDURE — 97530 THERAPEUTIC ACTIVITIES: CPT

## 2019-04-08 PROCEDURE — 97112 NEUROMUSCULAR REEDUCATION: CPT

## 2019-04-08 NOTE — PROGRESS NOTES
PT DAILY TREATMENT NOTE 10-18 Patient Name: Lamine Herring Date:2019 : 1931 [x]  Patient  Verified Payor: [place] MEDICARE / Plan: Johnson Fraziery / Product Type: Medicare / In time:143  Out time:230 Total Treatment Time (min): 47 Visit #: 4 of 12 Medicare/BCBS Only Total Timed Codes (min):  47 1:1 Treatment Time:  40 Treatment Area: Left shoulder pain [M25.512] At risk for falls [Z91.81] SUBJECTIVE Pain Level (0-10 scale): 3-4 Any medication changes, allergies to medications, adverse drug reactions, diagnosis change, or new procedure performed?: [x] No    [] Yes (see summary sheet for update) Subjective functional status/changes:   [] No changes reported I do some of these at home. OBJECTIVE Modality rationale:    
Min Type Additional Details  
 [] Estim:  []Unatt       []IFC  []Premod []Other:  []w/ice   []w/heat Position: Location:  
 [] Estim: []Att    []TENS instruct  []NMES []Other:  []w/US   []w/ice   []w/heat Position: Location:  
 []  Traction: [] Cervical       []Lumbar 
                     [] Prone          []Supine []Intermittent   []Continuous Lbs: 
[] before manual 
[] after manual  
 []  Ultrasound: []Continuous   [] Pulsed []1MHz   []3MHz W/cm2: 
Location:  
 []  Iontophoresis with dexamethasone Location: [] Take home patch  
[] In clinic  
 []  Ice     []  heat 
[]  Ice massage 
[]  Laser  
[]  Anodyne Position: Location:  
 []  Laser with stim 
[]  Other:  Position: Location:  
 []  Vasopneumatic Device Pressure:       [] lo [] med [] hi  
Temperature: [] lo [] med [] hi  
[] Skin assessment post-treatment:  []intact []redness- no adverse reaction 
  []redness  adverse reaction:  
 
  min []Eval                  []Re-Eval  
 
 
24 min Therapeutic Exercise:  [x] See flow sheet :  
 Rationale: increase ROM, increase strength and improve coordination to improve the patients ability to aid with increase tolerance to ADLs and activities 15 min Therapeutic Activity:  [x]  See flow sheet :  
Rationale: increase ROM, increase strength and improve coordination  to improve the patients ability to aid with increase tolerance to ADLS and activities 8 min Neuromuscular Re-education:  [x]  See flow sheet :  
Rationale: improve coordination, improve balance and increase proprioception  to improve the patients ability to aid with increase tolerance to ADLS and activities 
 
 min Manual Therapy:    
Rationale:  to  
 
 min Gait Training:  ___ feet with ___ device on level surfaces with ___ level of assist  
Rationale: With 
 [] TE 
 [] TA 
 [] neuro 
 [] other: Patient Education: [x] Review HEP [] Progressed/Changed HEP based on:  
[] positioning   [] body mechanics   [] transfers   [] heat/ice application   
[] other:   
 
Other Objective/Functional Measures: VC exercises and technique Pain Level (0-10 scale) post treatment: 3-4 ASSESSMENT/Changes in Function: tolerated well. Patient will continue to benefit from skilled PT services to modify and progress therapeutic interventions, address functional mobility deficits, address ROM deficits, address strength deficits, analyze and address soft tissue restrictions, analyze and cue movement patterns, analyze and modify body mechanics/ergonomics, assess and modify postural abnormalities, address imbalance/dizziness and instruct in home and community integration to attain remaining goals. []  See Plan of Care 
[]  See progress note/recertification 
[]  See Discharge Summary Progress towards goals / Updated goals: 
Short Term Goals: To be accomplished in 6 treatments: 1.  Patient will decrease shoulder pain during aggravating activities by 2 points on Verbal Analog Scale (Eval: 10/10) to increase participation on Activities of Daily Living as lifting, carrying, and reaching. CURRENT  MET at 3/10 4/5/19 4/8/19 2. Patient will demonstrate increased strength by ½ grade on MMT in B UEs (Eval: 3-/5) to improve functional participation in such tasks as prolonged standing and sitting.  
CURRENT 
      3.   Patient will demonstrate ability to perform deep squat in preparation for reaching to low surfaces (Eval: unable).  Progressing 4/5/19 - able to touch the floor and perform a half squat. CURRENT 
  
Long Term Goals: To be accomplished in 12 treatments: 4. Patient will decrease pain during aggravating activities by 4 points on Verbal Analog Scale (Eval: 10/10) to increase participation on Activities of Daily Living such as ifting, carrying, and reaching. CURRENT 3-4 4/8/19 
5. Patient will demonstrate increased strength by 1 grade on MMT in B UEs (Eval:3-/5) to improve functional participation in such tasks as working overhead for >2 min. CURRENT 6. Patient will demonstrate ability to reach to low surfaces, lift item, and stand up, as well as lift 10lbs down from overhead shelf (Eval:unable).  
CURRENT 
      7. Patient will achieve predicted FOTO scores (63, eval 59) to demonstrate decreased functional impairment to facilitate improved participation in activities of daily living, improve overall quality of life CURRENT  
 
 
 
PLAN [x]  Upgrade activities as tolerated     [x]  Continue plan of care 
[]  Update interventions per flow sheet      
[]  Discharge due to:_ 
[]  Other:_ Abhijeet Merlos, PT 4/8/2019  1:41 PM 
 
Future Appointments Date Time Provider Roverto Meza 4/8/2019  2:00 PM Sergio Hutchinson PT [facility]  
4/10/2019  9:00 AM [facility]  
4/11/2019  3:00 PM STEPHEN VillanuevaPTNABEEL [facility]  
4/15/2019  1:00 PM [facility]CS [facility]  
4/17/2019 11:30 AM [facility]EN [facility]an[facility]vaPTCS [facility]  
4/18/2019 11:30 AM [facility]  
 4/22/2019  1:30 PM Robin Rivera, PT [facility]  
4/24/2019 11:30 AM Robin Rivera, PT [facility]  
4/25/2019 11:00 AM Leola Lake, PT [facility]  
4/29/2019  1:30 PM Leola Lake, PT [facility]  
5/1/2019 12:00 PM Leola Lake, PT [facility]  
5/2/2019 11:30 AM Leola Lake, PT [facility]  
5/23/2019  1:15 PM MD SANJEEV [facility] [address]

## 2019-04-10 ENCOUNTER — HOSPITAL ENCOUNTER (OUTPATIENT)
Dept: PHYSICAL THERAPY | Age: 84
Discharge: HOME | End: 2019-04-10
Payer: MEDICARE

## 2019-04-10 PROCEDURE — 97530 THERAPEUTIC ACTIVITIES: CPT

## 2019-04-10 PROCEDURE — 97110 THERAPEUTIC EXERCISES: CPT

## 2019-04-10 NOTE — PROGRESS NOTES
PT DAILY TREATMENT NOTE 10-18 Patient Name: Jason Noble Date:4/10/2019 : 1931 [x]  Patient  Verified Payor: [place] MEDICARE / Plan: Johnson Barr y / Product Type: Medicare / In time:904  Out time:947 Total Treatment Time (min): 41 Visit #: 5 of 12 Medicare/BCBS Only Total Timed Codes (min):  31 1:1 Treatment Time:  25 Treatment Area: Left shoulder pain [M25.512] At risk for falls [Z91.81] SUBJECTIVE Pain Level (0-10 scale): 0 Any medication changes, allergies to medications, adverse drug reactions, diagnosis change, or new procedure performed?: [x] No    [] Yes (see summary sheet for update) Subjective functional status/changes:   [] No changes reported I am not having any pain. OBJECTIVE Modality rationale: post exercise recovery to improve the patients ability to aid with increase tolerance to ADLs and activities Min Type Additional Details  
 [] Estim:  []Unatt       []IFC  []Premod []Other:  []w/ice   []w/heat Position: Location:  
 [] Estim: []Att    []TENS instruct  []NMES []Other:  []w/US   []w/ice   []w/heat Position: Location:  
 []  Traction: [] Cervical       []Lumbar 
                     [] Prone          []Supine []Intermittent   []Continuous Lbs: 
[] before manual 
[] after manual  
 []  Ultrasound: []Continuous   [] Pulsed []1MHz   []3MHz W/cm2: 
Location:  
 []  Iontophoresis with dexamethasone Location: [] Take home patch  
[] In clinic  
10 [x]  Ice post    []  heat 
[]  Ice massage 
[]  Laser  
[]  Anodyne Position:reclined Location:left shoulder  
 []  Laser with stim 
[]  Other:  Position: Location:  
 []  Vasopneumatic Device Pressure:       [] lo [] med [] hi  
Temperature: [] lo [] med [] hi  
[] Skin assessment post-treatment:  []intact []redness- no adverse reaction 
  []redness  adverse reaction: min []Eval                  []Re-Eval  
 
 
23 min Therapeutic Exercise:  [] See flow sheet :  
Rationale: increase ROM, increase strength, improve coordination and improve balance to improve the patients ability to aid with increase tolerance to ADLS and activities 8 min Therapeutic Activity:  []  See flow sheet :  
Rationale: increase strength, improve coordination and improve balance  to improve the patients ability to aid with increase tolerance to ADLs and activities 
  
 min Neuromuscular Re-education:  []  See flow sheet :  
Rationale:   to improve the patients ability to  
 
 min Manual Therapy:    
Rationale:  to  
 
 min Gait Training:  ___ feet with ___ device on level surfaces with ___ level of assist  
Rationale: With 
 [] TE 
 [] TA 
 [] neuro 
 [] other: Patient Education: [x] Review HEP [] Progressed/Changed HEP based on:  
[] positioning   [] body mechanics   [] transfers   [] heat/ice application   
[] other:   
 
Other Objective/Functional Measures: increased UBE to level 1.5, VC exercise and technique. Pain Level (0-10 scale) post treatment: 0 
 
ASSESSMENT/Changes in Function: tolerated well. Patient will continue to benefit from skilled PT services to modify and progress therapeutic interventions, address ROM deficits, address strength deficits, analyze and address soft tissue restrictions, analyze and cue movement patterns, analyze and modify body mechanics/ergonomics, assess and modify postural abnormalities, address imbalance/dizziness and instruct in home and community integration to attain remaining goals. [x]  See Plan of Care 
[]  See progress note/recertification 
[]  See Discharge Summary Progress towards goals / Updated goals: 
Short Term Goals: To be accomplished in 6 treatments: 1.  Patient will decrease shoulder pain during aggravating activities by 2 points on Verbal Analog Scale (Eval: 10/10) to increase participation on Activities of Daily Living as lifting, carrying, and reaching. CURRENT  0 4/10/19 2. Patient will demonstrate increased strength by ½ grade on MMT in B UEs (Eval: 3-/5) to improve functional participation in such tasks as prolonged standing and sitting.  
CURRENT 
      3.   Patient will demonstrate ability to perform deep squat in preparation for reaching to low surfaces (Eval: unable).  Progressing 4/5/19 - able to touch the floor and perform a half squat.  
            CURRENT 
  
Long Term Goals: To be accomplished in 12 treatments: 4. Patient will decrease pain during aggravating activities by 4 points on Verbal Analog Scale (Eval: 10/10) to increase participation on Activities of Daily Living such as ifting, carrying, and reaching. CURRENT 0 4/10/19 5. Patient will demonstrate increased strength by 1 grade on MMT in B UEs (Eval:3-/5) to improve functional participation in such tasks as working overhead for >2 min. CURRENT 6. Patient will demonstrate ability to reach to low surfaces, lift item, and stand up, as well as lift 10lbs down from overhead shelf (Eval:unable).  
CURRENT 
      7. Patient will achieve predicted FOTO scores (63, eval 59) to demonstrate decreased functional impairment to facilitate improved participation in activities of daily living, improve overall quality of life CURRENT  
 
 
 
PLAN [x]  Upgrade activities as tolerated     [x]  Continue plan of care 
[]  Update interventions per flow sheet      
[]  Discharge due to:_ 
[]  Other:_ Marcy Ivy, PT 4/10/2019  9:16 AM 
 
Future Appointments Date Time Provider Roverto Meza 4/11/2019  3:00 PM Rossi Prim, PT [facility]  
4/15/2019  1:00 PM Rossi Prim, PT [facility]  
4/17/2019 11:30 AM Flo Bravo, PT [facility]  
4/18/2019 11:30 AM Remington España, PT [facility]  
4/22/2019  1:30 PM Siri Mcadams, PT [facility]  
 4/24/2019 11:30 AM Milly Medina, PT [facility]  
4/25/2019 11:00 AM Wil Murcia, PT [facility]  
4/29/2019  1:30 PM Wil Murcia, PT [facility]  
5/1/2019 12:00 PM Wil Murcia, PT [facility]  
5/2/2019 11:30 AM Wil Murcia, PT [facility]  
5/23/2019  1:15 PM Piter Muñiz MD [facility] [address]

## 2019-04-11 ENCOUNTER — HOSPITAL ENCOUNTER (OUTPATIENT)
Dept: PHYSICAL THERAPY | Age: 84
Discharge: HOME | End: 2019-04-11
Payer: MEDICARE

## 2019-04-11 PROCEDURE — 97110 THERAPEUTIC EXERCISES: CPT | Performed by: PHYSICAL THERAPIST

## 2019-04-11 PROCEDURE — 97530 THERAPEUTIC ACTIVITIES: CPT | Performed by: PHYSICAL THERAPIST

## 2019-04-11 NOTE — PROGRESS NOTES
PT DAILY TREATMENT NOTE 10-18 Patient Name: Yaa Garvey Date:2019 : 1931 [x]  Patient  Verified Payor: [place] MED[place] / Plan: Johnson Broussard / Product Type: Medicare / In time:3:24P  Out time:4:15P Total Treatment Time (min): 51 Visit #: 6 of 12 Medicare/BCBS Only Total Timed Codes (min):  25 1:1 Treatment Time:  25 Treatment Area: Left shoulder pain [M25.512] At risk for falls [Z91.81] SUBJECTIVE Pain Level (0-10 scale):  A little bit Any medication changes, allergies to medications, adverse drug reactions, diagnosis change, or new procedure performed?: [x] No    [] Yes (see summary sheet for update) Subjective functional status/changes:   [] No changes reported Patient states she is able to do what she wants, but avoids making the shoulder hurt. OBJECTIVE Modality rationale: decrease edema, decrease pain and increase tissue extensibility to improve the patients ability to lift as desired. Min Type Additional Details  
 [] Estim:  []Unatt       []IFC  []Premod []Other:  []w/ice   []w/heat Position: Location:  
 [] Estim: []Att    []TENS instruct  []NMES []Other:  []w/US   []w/ice   []w/heat Position: Location:  
 []  Traction: [] Cervical       []Lumbar 
                     [] Prone          []Supine []Intermittent   []Continuous Lbs: 
[] before manual 
[] after manual  
 []  Ultrasound: []Continuous   [] Pulsed []1MHz   []3MHz W/cm2: 
Location:  
 []  Iontophoresis with dexamethasone Location: [] Take home patch  
[] In clinic  
10 []  Ice     [x]  heat 
[]  Ice massage 
[]  Laser  
[]  Anodyne Position: long sit Location:Around both shoulders and neck  
 []  Laser with stim 
[]  Other:  Position: Location:  
 []  Vasopneumatic Device Pressure:       [] lo [] med [] hi  
Temperature: [] lo [] med [] hi  
 [x] Skin assessment post-treatment:  []intact []redness- no adverse reaction 
  []redness  adverse reaction:  
 
 
15 min Therapeutic Exercise:  [x] See flow sheet :  
Rationale: increase ROM and increase strength to improve the patients ability to improve A/ROM and decrease pain with movement. 10 min Therapeutic Activity:  [x]  See flow sheet :  
Rationale: increase strength and improve coordination  to improve the patients ability to perform basic ADLs and job-related tasks without pain. With 
 [x] TE 
 [x] TA 
 [] neuro 
 [] other: Patient Education: [x] Review HEP [x] Progressed/Changed HEP based on:  
[x] positioning   [] body mechanics   [] transfers   [] heat/ice application   
[] other:   
 
Other Objective/Functional Measures: A/ROM of R and L shoulder roughly 150 degs flexion Pain Level (0-10 scale) post treatment: 0/10 ASSESSMENT/Changes in Function: Patient is progressing well towards goals, but approaching discharge per her perspective. Recommend working on full floor transfers and lifting heavy objects and placing on shelf overhead next session. Patient will continue to benefit from skilled PT services to modify and progress therapeutic interventions, address functional mobility deficits, address ROM deficits, address strength deficits, analyze and address soft tissue restrictions, analyze and cue movement patterns, analyze and modify body mechanics/ergonomics and assess and modify postural abnormalities to attain remaining goals. [x]  See Plan of Care 
[]  See progress note/recertification 
[]  See Discharge Summary Progress towards goals / Updated goals: 
Short Term Goals: To be accomplished in 6 treatments: 1. Patient will decrease shoulder pain during aggravating activities by 2 points on Verbal Analog Scale (Eval: 10/10) to increase participation on Activities of Daily Living as lifting, carrying, and reaching. CURRENT  0 4/10/19 2. Patient will demonstrate increased strength by ½ grade on MMT in B UEs (Eval: 3-/5) to improve functional participation in such tasks as prolonged standing and sitting.  
CURRENT 
      3.   Patient will demonstrate ability to perform deep squat in preparation for reaching to low surfaces (Eval: unable).  Progressing 4/5/19 - able to touch the floor and perform a half squat.  
            CURRENT 
  
Long Term Goals: To be accomplished in 12 treatments: 4. Patient will decrease pain during aggravating activities by 4 points on Verbal Analog Scale (Eval: 10/10) to increase participation on Activities of Daily Living such as ifting, carrying, and reaching.  
CURRENT 0 4/10/19 5. Patient will demonstrate increased strength by 1 grade on MMT in B UEs (Eval:3-/5) to improve functional participation in such tasks as working overhead for >2 min. 
      CURRENT  
6. Patient will demonstrate ability to reach to low surfaces, lift item, and stand up, as well as lift 10lbs down from overhead shelf (Eval:unable).  
CURRENT 
      7. Patient will achieve predicted FOTO scores (63, eval 59) to demonstrate decreased functional impairment to facilitate improved participation in activities of daily living, improve overall quality of life             CURRENT  
 
PLAN [x]  Upgrade activities as tolerated     []  Continue plan of care 
[]  Update interventions per flow sheet      
[]  Discharge due to:_ 
[]  Other:_ Chelsea Holloway PT 4/11/2019  4:23 PM 
 
Future Appointments Date Time Provider Roverto Meza 4/15/2019  1:00 PM Vashtiica Fail, PT [facility]  
4/17/2019 11:30 AM Vivica Fail, PT [facility]  
4/18/2019 11:30 AM Corbett Schilder, PT [facility]  
4/22/2019  1:30 PM Vivica Fail, PT [facility]  
4/24/2019 11:30 AM Vivica Fail, PT [facility]  
4/25/2019 11:00 AM Corbett Schilder, PT [facility]  
4/29/2019  1:30 PM Corbett Schilder, PT [facility]  
 5/1/2019 12:00 PM Juan Uribe PT [facility]  
5/2/2019 11:30 AM Juan Uribe PT [facility]  
5/23/2019  1:15 PM MD ALONSO [facility] [address]

## 2019-04-15 ENCOUNTER — HOSPITAL ENCOUNTER (OUTPATIENT)
Dept: PHYSICAL THERAPY | Age: 84
Discharge: HOME | End: 2019-04-15
Payer: MEDICARE

## 2019-04-15 PROCEDURE — 97112 NEUROMUSCULAR REEDUCATION: CPT | Performed by: PHYSICAL THERAPIST

## 2019-04-15 PROCEDURE — 97110 THERAPEUTIC EXERCISES: CPT | Performed by: PHYSICAL THERAPIST

## 2019-04-15 PROCEDURE — 97530 THERAPEUTIC ACTIVITIES: CPT | Performed by: PHYSICAL THERAPIST

## 2019-04-15 NOTE — PROGRESS NOTES
PT DAILY TREATMENT NOTE 10-18 Patient Name: Tracy Albarado Date:4/15/2019 : 1931 [x]  Patient  Verified Payor: [place] MEDICARE / Plan: Johnson Broussard / Product Type: Medicare / In time:1:00P  Out time:2:08P Total Treatment Time (min): 50min Visit #: 7 of 12 Medicare/BCBS Only Total Timed Codes (min):  38 1:1 Treatment Time:  45 Treatment Area: Left shoulder pain [M25.512] At risk for falls [Z91.81] SUBJECTIVE Pain Level (0-10 scale): 9/10 all over Any medication changes, allergies to medications, adverse drug reactions, diagnosis change, or new procedure performed?: [x] No    [] Yes (see summary sheet for update) Subjective functional status/changes:   [] No changes reported Patient states she is feeling all-over achy second to her CA meds side effects, started feeling awful Saturday; still did her exercises. Her oncologist told her to go to therapy then go home, take her pain meds, and go to sleep. OBJECTIVE 10 min Therapeutic Exercise:  [x] See flow sheet :  
Rationale: increase ROM and increase strength to improve the patients ability to increase A/ROM and decrease pain with movement. 18 min Therapeutic Activity:  [x]  See flow sheet :  
Rationale: increase strength and improve coordination  to improve the patients ability to Tolerate basic ADLs and job-related tasks without pain. 10 min Neuromuscular Re-education:  [x]  See flow sheet :  
Rationale: improve balance and increase proprioception  to improve the patients ability to perform activities with good form and proprioception with tactile and verbal cuing appropriately. With 
 [x] TE 
 [x] TA [x] neuro 
 [] other: Patient Education: [x] Review HEP [x] Progressed/Changed HEP based on:  
[x] positioning   [] body mechanics   [] transfers   [] heat/ice application   
[] other:   
 
Other Objective/Functional Measures: A/ROM after exercises: Pain Level (0-10 scale) post treatment: 8/10 ASSESSMENT/Changes in Function: torie continues to need VC and TC to increaed Patient will continue to benefit from skilled PT services to modify and progress therapeutic interventions, address functional mobility deficits, address ROM deficits, address strength deficits, analyze and address soft tissue restrictions, analyze and cue movement patterns, analyze and modify body mechanics/ergonomics and assess and modify postural abnormalities to attain remaining goals. [x]  See Plan of Care 
[]  See progress note/recertification 
[]  See Discharge Summary Progress towards goals / Updated goals: 
Short Term Goals: To be accomplished in 6 treatments: 1. Patient will decrease shoulder pain during aggravating activities by 2 points on Verbal Analog Scale (Eval: 10/10) to increase participation on Activities of Daily Living as lifting, carrying, and reaching. CURRENT  0 4/10/19 2. Patient will demonstrate increased strength by ½ grade on MMT in B UEs (Eval: 3-/5) to improve functional participation in such tasks as prolonged standing and sitting.  
CURRENT 
      3.   Patient will demonstrate ability to perform deep squat in preparation for reaching to low surfaces (Eval: unable).  Progressing 4/5/19 - able to touch the floor and perform a half squat.  
            CURRENT 
  
Long Term Goals: To be accomplished in 12 treatments: 4. Patient will decrease pain during aggravating activities by 4 points on Verbal Analog Scale (Eval: 10/10) to increase participation on Activities of Daily Living such as ifting, carrying, and reaching.  
CURRENT 0 4/10/19 5.  Patient will demonstrate increased strength by 1 grade on MMT in B UEs (Eval:3-/5) to improve functional participation in such tasks as working overhead for >2 min. 
      CURRENT  
6. Patient will demonstrate ability to reach to low surfaces, lift item, and stand up, as well as lift 10lbs down from overhead shelf (Eval:unable).  
CURRENT 
      7. Patient will achieve predicted FOTO scores (63, eval 59) to demonstrate decreased functional impairment to facilitate improved participation in activities of daily living, improve overall quality of life             CURRENT  
 
PLAN [x]  Upgrade activities as tolerated     []  Continue plan of care 
[]  Update interventions per flow sheet      
[]  Discharge due to:_ 
[]  Other:_ Jacoby Mcadams, PT 4/15/2019  1:05 PM 
 
Future Appointments Date Time Provider Roverto Meza 4/17/2019 11:30 AM Mirza Yanez, PT [facility]  
4/18/2019 11:30 AM Lila Snider, PT [facility]  
4/22/2019  1:30 PM Mirza Yanez, PT [facility]  
4/24/2019 11:30 AM Mirza Yanez, PT [facility]  
4/25/2019 11:00 AM Lila Snider, PT [facility]  
4/29/2019  1:30 PM Lila Snider, PT [facility]  
5/1/2019 12:00 PM Lila Snider, PT [facility]  
5/2/2019 11:30 AM Lila Snider, PT [facility]  
5/23/2019  1:15 PM [facility] [address]

## 2019-04-17 ENCOUNTER — PATIENT OUTREACH (OUTPATIENT)
Dept: FAMILY MEDICINE CLINIC | Facility: CLINIC | Age: 84
End: 2019-04-17

## 2019-04-17 ENCOUNTER — HOSPITAL ENCOUNTER (OUTPATIENT)
Dept: PHYSICAL THERAPY | Age: 84
Discharge: HOME | End: 2019-04-17
Payer: MEDICARE

## 2019-04-17 PROCEDURE — 97140 MANUAL THERAPY 1/> REGIONS: CPT | Performed by: PHYSICAL THERAPIST

## 2019-04-17 PROCEDURE — 97530 THERAPEUTIC ACTIVITIES: CPT | Performed by: PHYSICAL THERAPIST

## 2019-04-17 PROCEDURE — 97110 THERAPEUTIC EXERCISES: CPT | Performed by: PHYSICAL THERAPIST

## 2019-04-17 NOTE — PROGRESS NOTES
Hospital Discharge Follow-Up 
  
  
Patient was admitted to [address] 3/14/19 and discharged on 3/15/19 for neck pain, multiple myeloma. Spoke with daughter, George Epperson, who stated patient is \"holding her own but still has a lot of back pain. \"  Daughter stated patient is hesitant to take the pain medication prescribed but has been told that she does need to use it to feel better. Daughter voiced no questions or concerns. Patient has graduated from the Transitions of Care Coordination  program on 4/17/19. Patient's symptoms are stable at this time. Patient/family has the ability to self-manage. Care management goals have been completed at this time. No further nurse navigator follow up scheduled. Pt has nurse navigator's contact information for any further questions, concerns, or needs. Patients upcoming visits:   
Future Appointments Date Time Provider Roverto Meza 4/18/2019 11:30 AM Emmett Nielsen PT [facility]  
4/22/2019  1:30 PM Cee Hull PT [facility]  
4/24/2019 11:30 AM Cee Hull PT [facility]  
4/25/2019 11:00 AM Emmett Nielsen PT [facility]  
4/29/2019  1:30 PM Emmett Nielsen PT [facility]  
5/1/2019 12:00 PM Emmett Nielsen PT [facility]  
5/2/2019 11:30 AM Emmett Nielsen PT [facility]  
5/23/2019  1:15 PM Deanne Green MD [facility] [address] Goals Addressed This Visit's Progress  Attends follow-up appointments as directed. On track Plan:  Monitor for attendance at apts and assist as needed to schedule 3/22/19-attended apt with Dr. Amina Michaels 4/17/19-attending outpatient PT 
  
  Knowledge and adherence of prescribed medication (ie. action, side effects, missed dose, etc.). On track Plan: Reconcile medications and assess patient understanding of purpose, how/when to take using the teach back technique 3/19/19, 4/17/19-reviewed with daughter who verbalized understanding of purpose and how/when to give medication

## 2019-04-17 NOTE — PROGRESS NOTES
PT DAILY TREATMENT NOTE 10-18 Patient Name: Marguerite Stratton Date:2019 : 1931 [x]  Patient  Verified Payor: [place] MEDICARE / Plan: Johnson Frazierwaldo / Product Type: Medicare / In time:11:00A  Out time:12:08P Total Treatment Time (min): 68 min Visit #: 8 of 12 Medicare/BCBS Only Total Timed Codes (min):  38 1:1 Treatment Time:  30 Treatment Area: Left shoulder pain [M25.512] At risk for falls [Z91.81] SUBJECTIVE Pain Level (0-10 scale): 0 Any medication changes, allergies to medications, adverse drug reactions, diagnosis change, or new procedure performed?: [x] No    [] Yes (see summary sheet for update) Subjective functional status/changes:   [] No changes reported Patient states she is feeling much better than last treatment session after taking pain medication. Was feeling well enough to pull weeds all day yesterday and cook spaghetti, but still wary about lifting in/out of an overhead cabinet. OBJECTIVE 10 min Therapeutic Exercise:  [x] See flow sheet :  
Rationale: increase ROM and increase strength to improve the patients ability to increase A/ROM and decrease pain with movement. 12 min Therapeutic Activity:  [x]  See flow sheet :  
Rationale: increase strength and improve coordination  to improve the patients ability to Tolerate basic ADLs and household-related tasks without pain. 15 min Manual Therapy:  Grades 2-3 mobs to cervical spine Rationale: decrease pain and increase tissue extensibility to the patients ability to perform functional tasks such as overhead reach. With 
 [x] TE 
 [x] TA 
 [] neuro 
 [] other: Patient Education: [x] Review HEP [x] Progressed/Changed HEP based on:  
[x] positioning   [] body mechanics   [] transfers   [] heat/ice application   
[] other:   
 
Other Objective/Functional Measures: pain-free shoulder A/ROM flexion 165 degs R and L Pain Level (0-10 scale) post treatment: 0/10 ASSESSMENT/Changes in Function: Patient is progressing well towards goals and Patient will continue to benefit from skilled PT services to modify and progress therapeutic interventions, address functional mobility deficits, address ROM deficits, address strength deficits, analyze and address soft tissue restrictions, analyze and cue movement patterns, analyze and modify body mechanics/ergonomics and assess and modify postural abnormalities to attain remaining goals. [x]  See Plan of Care 
[]  See progress note/recertification 
[]  See Discharge Summary Progress towards goals / Updated goals: 
Short Term Goals: To be accomplished in 6 treatments: 1. Patient will decrease shoulder pain during aggravating activities by 2 points on Verbal Analog Scale (Eval: 10/10) to increase participation on Activities of Daily Living as lifting, carrying, and reaching. CURRENT  0 4/10/19, 4/17/19 2. Patient will demonstrate increased strength by ½ grade on MMT in B UEs (Eval: 3-/5) to improve functional participation in such tasks as prolonged standing and sitting.  
CURRENT 3+/5 4/17/19 
      3.   Patient will demonstrate ability to perform deep squat in preparation for reaching to low surfaces (Eval: unable).  Progressing 4/5/19 - able to touch the floor and perform a half squat.  
            CURRENT: progressing 4/17/19 
  
Long Term Goals: To be accomplished in 12 treatments: 4. Patient will decrease pain during aggravating activities by 4 points on Verbal Analog Scale (Eval: 10/10) to increase participation on Activities of Daily Living such as ifting, carrying, and reaching.  
CURRENT 0 4/10/19 5.  Patient will demonstrate increased strength by 1 grade on MMT in B UEs (Eval:3-/5) to improve functional participation in such tasks as working overhead for >2 min. 
      CURRENT  
6. Patient will demonstrate ability to reach to low surfaces, lift item, and stand up, as well as lift 10lbs down from overhead shelf (Eval:unable).  
CURRENT 
      7. Patient will achieve predicted FOTO scores (63, eval 59) to demonstrate decreased functional impairment to facilitate improved participation in activities of daily living, improve overall quality of life             CURRENT  
PLAN [x]  Upgrade activities as tolerated     []  Continue plan of care [x]  Update interventions per flow sheet      
[]  Discharge due to:_ 
[]  Other:_ Hoang Gonzalez, PT 4/17/2019  12:31 PM 
 
Future Appointments Date Time Provider Roverto Meza 4/18/2019 11:30 AM Matt Rank, PT [facility] [address]  
4/22/2019  1:30 PM Toledo Adis, PT [facility] [address]  
4/24/2019 11:30 AM Fred Arceo, PT [facility] [address]  
4/25/2019 11:00 AM Matt Rank, PT [facility] [address]  
4/29/2019  1:30 PM Matt Rank, PT [facility] [address]  
5/1/2019 12:00 PM Matt Rank, PT [facility] [address]  
5/2/2019 11:30 AM Matt Rank, PT [facility] [address]  
5/23/2019  1:15 PM MD SANJEEV ThakurMA-ISAAC Angel

## 2019-04-18 ENCOUNTER — HOSPITAL ENCOUNTER (OUTPATIENT)
Dept: PHYSICAL THERAPY | Age: 84
Discharge: HOME | End: 2019-04-18
Payer: MEDICARE

## 2019-04-18 PROCEDURE — 97110 THERAPEUTIC EXERCISES: CPT

## 2019-04-18 PROCEDURE — 97530 THERAPEUTIC ACTIVITIES: CPT

## 2019-04-18 NOTE — PROGRESS NOTES
PT DAILY TREATMENT NOTE 10-18 Patient Name: Jean Sheehan Date:2019 : 1931 [x]  Patient  Verified Payor: [place] MEDICARE / Plan: [facility] / Product Type: Medicare / In time:1135  Out time:1225 Total Treatment Time (min): 50 Visit #: 9 of 12 Medicare/BCBS Only Total Timed Codes (min):  40 1:1 Treatment Time:  40 Treatment Area: Left shoulder pain [M25.512] At risk for falls [Z91.81] SUBJECTIVE Pain Level (0-10 scale): back is sore Any medication changes, allergies to medications, adverse drug reactions, diagnosis change, or new procedure performed?: [x] No    [] Yes (see summary sheet for update) Subjective functional status/changes:   [] No changes reported Back is sore today. I think weeding should count for something OBJECTIVE Modality rationale: decrease pain and increase tissue extensibility to improve the patients ability to aid with increase Min Type Additional Details  
 [] Estim:  []Unatt       []IFC  []Premod []Other:  []w/ice   []w/heat Position: Location:  
 [] Estim: []Att    []TENS instruct  []NMES []Other:  []w/US   []w/ice   []w/heat Position: Location:  
 []  Traction: [] Cervical       []Lumbar 
                     [] Prone          []Supine []Intermittent   []Continuous Lbs: 
[] before manual 
[] after manual  
 []  Ultrasound: []Continuous   [] Pulsed []1MHz   []3MHz W/cm2: 
Location:  
 []  Iontophoresis with dexamethasone Location: [] Take home patch  
[] In clinic  
10 []  Ice     [x]  Heat post 
[]  Ice massage 
[]  Laser  
[]  Anodyne Position:reclined Location:left UT/Csp  
 []  Laser with stim 
[]  Other:  Position: Location:  
 []  Vasopneumatic Device Pressure:       [] lo [] med [] hi  
Temperature: [] lo [] med [] hi  
[] Skin assessment post-treatment:  []intact []redness- no adverse reaction []redness  adverse reaction:  
 
 min []Eval                  []Re-Eval  
 
 
25 min Therapeutic Exercise:  [x] See flow sheet :  
Rationale: increase ROM, increase strength and improve coordination to improve the patients ability to aid with increase tolerance to ADLS and activities 15 min Therapeutic Activity:  [x]  See flow sheet :  
Rationale: increase ROM, increase strength, improve coordination and increase proprioception  to improve the patients ability to tolerate ADLS and activities 
  
 min Neuromuscular Re-education:  []  See flow sheet :  
Rationale:   to improve the patients ability to  
 
 min Manual Therapy:    
Rationale:  to  
 
 min Gait Training:  ___ feet with ___ device on level surfaces with ___ level of assist  
Rationale: With 
 [] TE 
 [] TA 
 [] neuro 
 [] other: Patient Education: [x] Review HEP [] Progressed/Changed HEP based on:  
[] positioning   [] body mechanics   [] transfers   [] heat/ice application   
[] other:   
 
Other Objective/Functional Measures: VC exercises and technique Pain Level (0-10 scale) post treatment: 0 
 
ASSESSMENT/Changes in Function: tolerated well. Patient will continue to benefit from skilled PT services to modify and progress therapeutic interventions, address ROM deficits, address strength deficits, analyze and address soft tissue restrictions, analyze and cue movement patterns, analyze and modify body mechanics/ergonomics, assess and modify postural abnormalities, address imbalance/dizziness and instruct in home and community integration to attain remaining goals. [x]  See Plan of Care 
[]  See progress note/recertification 
[]  See Discharge Summary Progress towards goals / Updated goals: 
Short Term Goals: To be accomplished in 6 treatments: 1.  Patient will decrease shoulder pain during aggravating activities by 2 points on Verbal Analog Scale (Eval: 10/10) to increase participation on Activities of Daily Living as lifting, carrying, and reaching. CURRENT  0 4/10/19, 4/17/19   Back soreness 4/18/19 2. Patient will demonstrate increased strength by ½ grade on MMT in B UEs (Eval: 3-/5) to improve functional participation in such tasks as prolonged standing and sitting.  
CURRENT 3+/5 4/17/19 
      3.   Patient will demonstrate ability to perform deep squat in preparation for reaching to low surfaces (Eval: unable).  Progressing 4/5/19 - able to touch the floor and perform a half squat.  
            CURRENT: progressing 4/17/19 
  
Long Term Goals: To be accomplished in 12 treatments: 4. Patient will decrease pain during aggravating activities by 4 points on Verbal Analog Scale (Eval: 10/10) to increase participation on Activities of Daily Living such as ifting, carrying, and reaching.  
CURRENT 0 4/10/19 5. Patient will demonstrate increased strength by 1 grade on MMT in B UEs (Eval:3-/5) to improve functional participation in such tasks as working overhead for >2 min. 
      CURRENT  
6. Patient will demonstrate ability to reach to low surfaces, lift item, and stand up, as well as lift 10lbs down from overhead shelf (Eval:unable).  
CURRENT 
      7. Patient will achieve predicted FOTO scores (63, eval 59) to demonstrate decreased functional impairment to facilitate improved participation in activities of daily living, improve overall quality of life             CURRENT  
 
 
 
PLAN [x]  Upgrade activities as tolerated     [x]  Continue plan of care 
[]  Update interventions per flow sheet      
[]  Discharge due to:_ 
[]  Other:_ Abhijeet Merlos, PT 4/18/2019  11:40 AM 
 
Future Appointments Date Time Provider Roverto Meza 4/22/2019  1:30 PM STEPHEN Villanueva [facility]  
4/24/2019 11:30 AM STEPHEN Villanueva [facility]  
4/25/2019 11:00 AM STEPHEN [facility]  
4/29/2019  1:30 PM STEPHEN Murphy [facility]  
 5/1/2019 12:00 PM Tawana Davidson PT [facility]  
5/2/2019 11:30 AM STEPHEN Rock [facility]  
5/23/2019  1:15 PM MD [facility] [address]

## 2019-04-22 ENCOUNTER — HOSPITAL ENCOUNTER (OUTPATIENT)
Dept: PHYSICAL THERAPY | Age: 84
Discharge: HOME | End: 2019-04-22
Payer: MEDICARE

## 2019-04-22 PROCEDURE — 97110 THERAPEUTIC EXERCISES: CPT | Performed by: PHYSICAL THERAPIST

## 2019-04-22 PROCEDURE — 97530 THERAPEUTIC ACTIVITIES: CPT | Performed by: PHYSICAL THERAPIST

## 2019-04-22 PROCEDURE — 97112 NEUROMUSCULAR REEDUCATION: CPT | Performed by: PHYSICAL THERAPIST

## 2019-04-22 NOTE — PROGRESS NOTES
[address], [address] [place], [address] 
[phone] fax Physician Update [x] Progress Note  [] Discharge Summary Patient name: Linda Short Start of Care: 2019 Referral source: Catina Castro NP : 1931 Medical Diagnosis: Neck pain on left side [M54.2] Payor: Priti Rivas / Plan: [employer] / Product Type: Medicare /  Onset Date:3/1/19 Treatment Diagnosis: L Shoulder pain, Joint stiffness, weakness, impaired posture, falls risk, repeated falls Prior Hospitalization: see medical history Provider#: [id number] Medications: Verified on Patient summary List  
 Comorbidities: Cancer, OA, visual and hearing impairments Prior Level of Function: Was able to perform household chores and gardening as much and as long as she wanted to without pain, falling, or instability.  
Visits from Start of Care: 10    Missed Visits: 0 Status at Evaluation/Last Progress Note: Patient is a pleasant older adult female with insidious onset of L shoulder pain which appears to be sub-acute exacerbation of chronic L shoulder pain. Special testing performed suggested L Shoulder impingement syndrome in the presence of poor posture, limited cardiovascular activity, elevated fear avoidance belief factor, and fear of falling. Progress towards Goals: Short Term Goals: To be accomplished in 6 treatments: 1. Patient will decrease shoulder pain during aggravating activities by 2 points on Verbal Analog Scale (Eval: 10/10) to increase participation on Activities of Daily Living as lifting, carrying, and reaching. CURRENT MET 0 4/10/19, 19, occasional \"twinge\" but no pain 19;   Back soreness 19, 19,  
2.  Patient will demonstrate increased strength by ½ grade on MMT in B UEs (Eval: 3-/5) to improve functional participation in such tasks as prolonged standing and sitting.  
 CURRENT MET 3+/5 4/17/19 
      3.   Patient will demonstrate ability to perform deep squat in preparation for reaching to low surfaces (Eval: unable).   
            CURRENT: Progressing able to touch the floor and perform a half squat. 4/17/19, 4/22/19 Long Term Goals: To be accomplished in 12 treatments: 4. Patient will decrease pain during aggravating activities by 4 points on Verbal Analog Scale (Eval: 10/10) to increase participation on Activities of Daily Living such as ifting, carrying, and reaching.  
CURRENT MET 0 4/10/19, 4/22/19 5. Patient will demonstrate increased strength by 1 grade on MMT in B UEs (Eval:3-/5) to improve functional participation in such tasks as working overhead for >2 min. 
      CURRENT Progressing at 4-/5 4/22/19 6. Patient will demonstrate ability to reach to low surfaces, lift item, and stand up, as well as lift 10lbs down from overhead shelf (Eval:unable).  
CURRENT: Progressing: able to lift 6lbs down from shelf overhead 4/22/19 
      7. Patient will achieve predicted FOTO scores (63, eval 59) to demonstrate decreased functional impairment to facilitate improved participation in activities of daily living, improve overall quality of life             CURRENT  
Goals: to be achieved in 1 weeks: See above Long Term goals 5, 6, 7. ASSESSMENT/RECOMMENDATIONS: Patient is progressing well towards goals of decreased pain and increased function. Anticipate discharge in 1-2 more visits. Reports being able to lift her mother's [place] bowls down from overhead yesterday without pain. [x]Continue therapy per initial plan/protocol at a frequency of  3 x per week for 6 weeks for 2 more visits for total of 12 visits since start of care. []Continue therapy with the following recommended changes:_____________________      _____________________________________________________________________ []Discontinue therapy progressing towards or have reached established goals []Discontinue therapy due to lack of appreciable progress towards goals []Discontinue therapy due to lack of attendance or compliance []Await Physician's recommendations/decisions regarding therapy []Other:________________________________________________________________ Thank you for this referral. Finesse Gan, PT 4/22/2019 1:44 PM 
NOTE TO PHYSICIAN:  PLEASE COMPLETE THE ORDERS BELOW AND  
FAX TO [facility]: [phone] If you are unable to process this request in 24 hours please contact our office: [phone] ? I have read the above report and request that my patient continue as recommended. ? I have read the above report and request that my patient continue therapy with the following changes/special instructions:_____________________________________ ? I have read the above report and request that my patient be discharged from therapy.  
 
[de-identified] Signature:____________Date:_________TIME:________ 
 
[employer], Date and Time must be completed for valid certification **

## 2019-04-22 NOTE — PROGRESS NOTES
PT DAILY TREATMENT NOTE 10-18 Patient Name: Samm Camarena Date:2019 : 1931 [x]  Patient  Verified Payor: [place] MEDICARE / Plan: Johnson Broussard / Product Type: Medicare / In time:1:40P  Out time:2:25P Total Treatment Time (min): 45 Visit #: 10 of 12 Medicare/BCBS Only Total Timed Codes (min):  40 1:1 Treatment Time:  40 Treatment Area: Left shoulder pain [M25.512] At risk for falls [Z91.81] SUBJECTIVE Pain Level (0-10 scale): 0/10 Any medication changes, allergies to medications, adverse drug reactions, diagnosis change, or new procedure performed?: [x] No    [] Yes (see summary sheet for update) Subjective functional status/changes:   [] No changes reported Patient states her back has been sore recently. OBJECTIVE Modality rationale: decrease pain and increase tissue extensibility to improve the patients ability to increase activity tolerance. Min Type Additional Details  
 [] Estim:  []Unatt       []IFC  []Premod []Other:  []w/ice   []w/heat Position: Location:  
 [] Estim: []Att    []TENS instruct  []NMES []Other:  []w/US   []w/ice   []w/heat Position: Location:  
 []  Traction: [] Cervical       []Lumbar 
                     [] Prone          []Supine []Intermittent   []Continuous Lbs: 
[] before manual 
[] after manual  
 []  Ultrasound: []Continuous   [] Pulsed []1MHz   []3MHz W/cm2: 
Location:  
 []  Iontophoresis with dexamethasone Location: [] Take home patch  
[] In clinic  
5 []  Ice     [x]  heat 
[]  Ice massage 
[]  Laser  
[]  Anodyne Position:long sit Location:shoulders and neck  
 []  Laser with stim 
[]  Other:  Position: Location:  
 []  Vasopneumatic Device Pressure:       [] lo [] med [] hi  
Temperature: [] lo [] med [] hi  
[] Skin assessment post-treatment:  []intact []redness- no adverse reaction []redness  adverse reaction:  
 
12 min Therapeutic Exercise:  [x] See flow sheet :  
Rationale: increase ROM and increase strength to improve the patients ability to increase A/ROM and decrease pain with movement. 12 min Therapeutic Activity:  [x]  See flow sheet :  
Rationale: increase strength and improve coordination  to improve the patients ability to Tolerate basic ADLs and job-related tasks without pain. 16 min Neuromuscular Re-education:  []  See flow sheet :  
Rationale: increase strength, improve coordination and improve balance  to improve the patients ability to perform activities with good form and proprioception with tactile and verbal cuing appropriately. With 
 [x] TE 
 [x] TA [x] neuro 
 [] other: Patient Education: [x] Review HEP [x] Progressed/Changed HEP based on:  
[] positioning   [] body mechanics   [] transfers   [] heat/ice application   
[] other:   
 
Other Objective/Functional Measures: A/ROM Shoulder flexion: 160, abduction 160 degs. Pain Level (0-10 scale) post treatment: 0/10 ASSESSMENT/Changes in Function: Patient is progressing well towards goals of decreased pain and increased function. Anticipate discharge in 1-2 more visits. Reports being able to lift her mother's [place] bowls down from overhead yesterday without pain. Patient will continue to benefit from skilled PT services to modify and progress therapeutic interventions, address strength deficits, analyze and address soft tissue restrictions, analyze and cue movement patterns, analyze and modify body mechanics/ergonomics and assess and modify postural abnormalities to attain remaining goals. [x]  See Plan of Care 
[]  See progress note/recertification 
[]  See Discharge Summary Progress towards goals / Updated goals: 
Progress towards Goals: Short Term Goals: To be accomplished in 6 treatments: 1.  Patient will decrease shoulder pain during aggravating activities by 2 points on Verbal Analog Scale (Eval: 10/10) to increase participation on Activities of Daily Living as lifting, carrying, and reaching. CURRENT MET 0 4/10/19, 4/17/19, occasional \"twinge\" but no pain 4/22/19;   Back soreness 4/18/19, 4/22/19,  
2. Patient will demonstrate increased strength by ½ grade on MMT in B UEs (Eval: 3-/5) to improve functional participation in such tasks as prolonged standing and sitting.  
CURRENT MET 3+/5 4/17/19 
      3.   Patient will demonstrate ability to perform deep squat in preparation for reaching to low surfaces (Eval: unable).   
            CURRENT: Progressing able to touch the floor and perform a half squat. 4/17/19, 4/22/19 Long Term Goals: To be accomplished in 12 treatments: 4. Patient will decrease pain during aggravating activities by 4 points on Verbal Analog Scale (Eval: 10/10) to increase participation on Activities of Daily Living such as ifting, carrying, and reaching.  
CURRENT MET 0 4/10/19, 4/22/19 5. Patient will demonstrate increased strength by 1 grade on MMT in B UEs (Eval:3-/5) to improve functional participation in such tasks as working overhead for >2 min. 
      CURRENT Progressing at 4-/5 4/22/19 6. Patient will demonstrate ability to reach to low surfaces, lift item, and stand up, as well as lift 10lbs down from overhead shelf (Eval:unable).  
CURRENT: Progressing: able to lift 6lbs down from shelf overhead 4/22/19 
      7. Patient will achieve predicted FOTO scores (63, eval 59) to demonstrate decreased functional impairment to facilitate improved participation in activities of daily living, improve overall quality of life             CURRENT: MET at 66 4/22/19 PLAN [x]  Upgrade activities as tolerated     []  Continue plan of care 
[]  Update interventions per flow sheet      
[]  Discharge due to:_ 
[]  Other:_ Khari Mcadams, PT 4/22/2019  2:06 PM 
 
Future Appointments Date Time Provider Roverto Meza 4/24/2019 11:30 AM Marylin Mtz, PT [facility]  
4/25/2019 11:00 AM Heaven Austin, PT [facility]  
4/29/2019  1:30 PM Heaven Austin, PT [facility]  
5/1/2019 12:00 PM Heaven Austin, PT [facility]  
5/2/2019 11:30 AM Heaven Austin, PT [facility]  
5/23/2019  1:15 PM [facility] [address]

## 2019-04-24 ENCOUNTER — HOSPITAL ENCOUNTER (OUTPATIENT)
Dept: PHYSICAL THERAPY | Age: 84
Discharge: HOME | End: 2019-04-24
Payer: MEDICARE

## 2019-04-25 ENCOUNTER — HOSPITAL ENCOUNTER (OUTPATIENT)
Dept: PHYSICAL THERAPY | Age: 84
Discharge: HOME | End: 2019-04-25
Payer: MEDICARE

## 2019-04-25 PROCEDURE — 97530 THERAPEUTIC ACTIVITIES: CPT

## 2019-04-25 PROCEDURE — 97110 THERAPEUTIC EXERCISES: CPT

## 2019-04-25 NOTE — PROGRESS NOTES
PT DISCHARGE DAILY NOTE AND [id number] Date:2019 Patient name: Samm Camarena Start of Care: 19 Referral source: Viet Marks NP : 1931 Medical/Treatment Diagnosis: Left shoulder pain [M25.512] At risk for falls [Z91.81] Onset Date:3/1/19 Prior Hospitalization: see medical history Provider#: [id number] Medications: Verified on Patient Summary List   
Comorbidities: Cancer, OA, visual and hearing impairments 
 Prior Level of Function: Was able to perform household chores and gardening as much and as long as she wanted to without pain, falling, or instability.  
 
 
Visits from Start of Care: 12    Missed Visits: 0 Reporting Period : 19 to 19 [x]  Patient  Verified Payor: [place] MEDICARE / Plan: [address] [place] [address] / Product Type: Medicare / In time:1053  Out time:1138 Total Treatment Time (min): 45 Total Timed Codes (min): 45 
1:1 Treatment Time ( only): 38 Visit #: 12 of 12 SUBJECTIVE Pain Level (0-10 scale): 0 Any medication changes, allergies to medications, adverse drug reactions, diagnosis change, or new procedure performed?: [x] No    [] Yes (see summary sheet for update) Subjective functional status/changes:   [] No changes reported The shoulder is doing much better 90-95% better. Not having any pain in my shoulder. I am reaching better. I am able to vacuum and scrub and wax floors. OBJECTIVE Modality rationale:   
Min Type Additional Details  
 [] Estim:  []Unatt       []IFC  []Premod []Other:  []w/ice   []w/heat Position: Location:  
 [] Estim: []Att    []TENS instruct  []NMES []Other:  []w/US   []w/ice   []w/heat Position: Location:  
 []  Traction: [] Cervical       []Lumbar 
                     [] Prone          []Supine []Intermittent   []Continuous Lbs: 
[] before manual 
[] after manual  
 []  Ultrasound: []Continuous   [] Pulsed []1MHz   []3MHz W/cm2: 
Location:  
 []  Iontophoresis with dexamethasone Location: [] Take home patch  
[] In clinic  
 []  Ice     []  heat 
[]  Ice massage 
[]  Laser  
[]  Anodyne Position: Location:  
 []  Laser with stim 
[]  Other:  Position: Location:  
 []  Vasopneumatic Device Pressure:       [] lo [] med [] hi  
Temperature: [] lo [] med [] hi  
[] Skin assessment post-treatment:  []intact []redness- no adverse reaction 
  []redness  adverse reaction:  
 
  min []Eval                  []Re-Eval  
 
 
23 min Therapeutic Exercise:  [x] See flow sheet :  
Rationale: increase ROM, increase strength and improve coordination to improve the patients ability to aid with increase tolerance to ADLs and activities 22 min Therapeutic Activity:  [x]  See flow sheet :  
Rationale: increase ROM, increase strength and improve coordination  to improve the patients ability to aid with increase tolerance to ADLS and activities 
  
 min Neuromuscular Re-education:  []  See flow sheet :  
Rationale:   to improve the patients ability to  
 
 min Manual Therapy:    
Rationale:  to  
 
 min Gait Training:  ___ feet with ___ device on level surfaces with ___ level of assist  
Rationale: With 
 [] TE 
 [] TA 
 [] neuro 
 [] other: Patient Education: [x] Review HEP [] Progressed/Changed HEP based on:  
[] positioning   [] body mechanics   [] transfers   [] heat/ice application   
[x] other:   DISCHARGE INSTRUCTIONS Other Objective/Functional Measures: VC exercises and tech. FOTO 60  MMT Left Shoulder MMG 5/5 AROM WFL and demonstrates full overhead flexion > 125 Pain Level (0-10 scale) post treatment: 0 Summary of Care: 
1. Goal:Patient will decrease shoulder pain during aggravating activities by 2 points on Verbal Analog Scale (Eval: 10/10) to increase participation on Activities of Daily Living as lifting, carrying, and reaching Status at last note/certification:eval 
Status at discharge:  
 
2. Goal:Patient will demonstrate increased strength by ½ grade on MMT in B UEs (Eval: 3-/5) to improve functional participation in such tasks as prolonged standing and sitting.  
Status at last note/certification:eval 
Status at discharge: met Goal:Patient will demonstrate ability to reach to low surfaces, lift item, and stand up, as well as lift 10lbs down from overhead shelf (Eval:unable).  
Status at last note/certification:eval 
Status at discharge: met Goal:Patient will achieve predicted FOTO scores (63, eval 59) to demonstrate decreased functional impairment to facilitate improved participation in activities of daily living, improve overall quality of life. Status at last note/certification:eval 
Status at discharge: met ASSESSMENT/Changes in Function: tolerated well overall with regard to improved left shoulder pain and ROM and she states she feels much better but still with some strength issues. She appears able to attempt self management. She has CCO hip pain on the  left and LBP as well. She sees her cancer doctor tomorrow and she will ask about returning for her current left hip and LBP Thank you for this referral! 
  
PLAN [x]Discontinue therapy: [x]Patient has reached or is progressing toward set goals []Patient is non-compliant or has abdicated 
    []Due to lack of appreciable progress towards set goals Markos Morales, PT 4/25/2019  11:21 AM

## 2019-04-26 ENCOUNTER — HOSPITAL ENCOUNTER (OUTPATIENT)
Dept: GENERAL RADIOLOGY | Age: 84
Discharge: HOME | End: 2019-04-26
Payer: MEDICARE

## 2019-04-26 DIAGNOSIS — C90.00 MULTIPLE MYELOMA (HCC): ICD-10-CM

## 2019-04-26 PROCEDURE — 72110 X-RAY EXAM L-2 SPINE 4/>VWS: CPT

## 2019-04-29 ENCOUNTER — APPOINTMENT (OUTPATIENT)
Dept: PHYSICAL THERAPY | Age: 84
End: 2019-04-29
Payer: MEDICARE

## 2019-04-30 RX ORDER — SERTRALINE HYDROCHLORIDE 50 MG/1
TABLET, FILM COATED ORAL
Qty: 90 TAB | Refills: 3 | Status: SHIPPED | OUTPATIENT
Start: 2019-04-30 | End: 2019-07-12 | Stop reason: SDUPTHER

## 2019-05-01 ENCOUNTER — APPOINTMENT (OUTPATIENT)
Dept: PHYSICAL THERAPY | Age: 84
End: 2019-05-01

## 2019-05-02 ENCOUNTER — APPOINTMENT (OUTPATIENT)
Dept: PHYSICAL THERAPY | Age: 84
End: 2019-05-02

## 2019-05-03 ENCOUNTER — HOSPITAL ENCOUNTER (OUTPATIENT)
Dept: GENERAL RADIOLOGY | Age: 84
Discharge: HOME | End: 2019-05-03
Payer: MEDICARE

## 2019-05-03 DIAGNOSIS — M25.552 PAIN IN LEFT HIP: ICD-10-CM

## 2019-05-03 PROCEDURE — 73502 X-RAY EXAM HIP UNI 2-3 VIEWS: CPT

## 2019-05-08 ENCOUNTER — OFFICE VISIT (OUTPATIENT)
Dept: FAMILY MEDICINE CLINIC | Facility: CLINIC | Age: 84
End: 2019-05-08

## 2019-05-08 VITALS
DIASTOLIC BLOOD PRESSURE: 50 MMHG | HEIGHT: 65 IN | RESPIRATION RATE: 18 BRPM | OXYGEN SATURATION: 93 % | BODY MASS INDEX: 24.66 KG/M2 | HEART RATE: 69 BPM | SYSTOLIC BLOOD PRESSURE: 112 MMHG | TEMPERATURE: 99.8 F | WEIGHT: 148 LBS

## 2019-05-08 DIAGNOSIS — J22 LOWER RESPIRATORY INFECTION: Primary | ICD-10-CM

## 2019-05-08 DIAGNOSIS — I10 ESSENTIAL HYPERTENSION, BENIGN: ICD-10-CM

## 2019-05-08 DIAGNOSIS — R05.9 COUGH: ICD-10-CM

## 2019-05-08 RX ORDER — PREDNISONE 20 MG/1
TABLET ORAL
Qty: 8 TAB | Refills: 0 | Status: SHIPPED | OUTPATIENT
Start: 2019-05-08 | End: 2019-07-12 | Stop reason: ALTCHOICE

## 2019-05-08 RX ORDER — AZITHROMYCIN 250 MG/1
TABLET, FILM COATED ORAL
Qty: 6 TAB | Refills: 0 | Status: SHIPPED | OUTPATIENT
Start: 2019-05-08 | End: 2019-07-12 | Stop reason: ALTCHOICE

## 2019-05-08 RX ORDER — HYDROCODONE BITARTRATE AND ACETAMINOPHEN 5; 325 MG/1; MG/1
1 TABLET ORAL
COMMUNITY

## 2019-05-08 NOTE — PROGRESS NOTES
Chief Complaint   Patient presents with    Cough    Nasal Congestion    Fever         Is someone accompanying this pt? yes    Is the patient using any DME equipment during OV? no    Depression Screening:  3 most recent PHQ Screens 10/20/2016 10/20/2016 9/25/2015   Little interest or pleasure in doing things More than half the days Not at all Several days   Feeling down, depressed, irritable, or hopeless More than half the days More than half the days Several days   Total Score PHQ 2 4 2 2   Trouble falling or staying asleep, or sleeping too much Nearly every day - -   Feeling tired or having little energy Nearly every day - -   Poor appetite, weight loss, or overeating Not at all - -   Feeling bad about yourself - or that you are a failure or have let yourself or your family down Not at all - -   Trouble concentrating on things such as school, work, reading, or watching TV Not at all - -   Moving or speaking so slowly that other people could have noticed; or the opposite being so fidgety that others notice Several days - -   Thoughts of being better off dead, or hurting yourself in some way Not at all - -   PHQ 9 Score 11 - -   How difficult have these problems made it for you to do your work, take care of your home and get along with others Not difficult at all - -       Learning Assessment:  Learning Assessment 1/8/2016   PRIMARY LEARNER Patient   HIGHEST LEVEL OF EDUCATION - PRIMARY LEARNER  GRADUATED HIGH SCHOOL OR GED   BARRIERS PRIMARY LEARNER NONE   CO-LEARNER CAREGIVER No   PRIMARY LANGUAGE ENGLISH    NEED No   LEARNER PREFERENCE PRIMARY VIDEOS     DEMONSTRATION     READING     PICTURES   ANSWERED BY patient   RELATIONSHIP SELF       Abuse Screening:  No flowsheet data found. Fall Risk  Fall Risk Assessment, last 12 mths 3/22/2019 3/18/2019 10/26/2018 10/2/2018 8/6/2018 12/5/2017 10/20/2016   Able to walk? Yes Yes Yes Yes Yes Yes Yes   Fall in past 12 months?  Yes Yes Yes Yes Yes No -   Fall with injury? Yes Yes No Yes Yes - -   Number of falls in past 12 months 1 1 1 - 1 - -   Fall Risk Score 2 2 1 - 2 - -         Health Maintenance reviewed and discussed per provider. Pt is due for   Health Maintenance Due   Topic    Shingrix Vaccine Age 50> (1 of 2)    Bone Densitometry (Dexa) Screening     Pneumococcal 65+ years (2 of 2 - PCV13)    DTaP/Tdap/Td series (1 - Tdap)    BREAST CANCER SCRN MAMMOGRAM     MEDICARE YEARLY EXAM     Please order/place referral if appropriate. Pt currently taking Antiplatelet therapy? no      Coordination of Care:  1. Have you been to the ER, urgent care clinic since your last visit? Hospitalized since your last visit? no    2. Have you seen or consulted any other health care providers outside of the [address] [place] [address] since your last visit? Include any pap smears or colon screening. no    Please see Red banners under Allergies, Med rec, Immunizations to remove outside inquires. All correct information has been verified with patient and added to chart.

## 2019-05-08 NOTE — PROGRESS NOTES
Jean Sheehan is a 80 y.o. female presenting today for Cough; Nasal Congestion; and Fever  . HPI:  Jean Sheehan presents to the office today for nonproductive cough, nasal congestion and fever. Per patient she has had a nonproductive cough with fever x1 week. She is also complaining of back pain at a 7 on a scale of 0-10. Patient is febrile today and her temp is 99.8. She denies any sore throat, facial pain or dyspnea. Review of Systems   HENT: Negative for sore throat. Respiratory: Positive for cough. Negative for sputum production, shortness of breath and wheezing. Cardiovascular: Negative for chest pain and palpitations. Gastrointestinal: Negative for nausea and vomiting. Neurological: Negative for headaches. Allergies   Allergen Reactions    Penicillin V Potassium Diarrhea     States not allergic    Shellfish Derived Swelling       Current Outpatient Medications   Medication Sig Dispense Refill    HYDROcodone-acetaminophen (NORCO) 5-325 mg per tablet Take 1 Tab by mouth every four (4) hours as needed for Pain.  azithromycin (ZITHROMAX) 250 mg tablet Take 2 tablets today, then take 1 tablet daily 6 Tab 0    predniSONE (DELTASONE) 20 mg tablet 2 tabs daily x 2 days, 1 tab daily x 2 days, 1/2 tab daily x 4 days 8 Tab 0    sertraline (ZOLOFT) 50 mg tablet TAKE 1 TABLET BY MOUTH ONCE DAILY 90 Tab 3    hydroCHLOROthiazide (MICROZIDE) 12.5 mg capsule TAKE 1 CAPSULE BY MOUTH ONCE DAILY IN THE MORNING 90 Cap 0    diazePAM (VALIUM) 2 mg tablet 1/2 to 1 tab every 8 hours as needed for dizziness 50 Tab 1    REVLIMID 25 mg cap       aspirin delayed-release 81 mg tablet 81 mg.      gabapentin (NEURONTIN) 100 mg capsule 1 cap three times per day for 2 weeks 50 Cap 0    clotrimazole-betamethasone (LOTRISONE) topical cream APPLY TO AFFECTED AREA TWICE PER DAY 45 g 2    acetaminophen (TYLENOL) 325 mg tablet Take  by mouth every four (4) hours as needed for Pain.       cyanocobalamin 1,000 mcg tablet Take 1,000 mcg by mouth daily.  cyclobenzaprine (FLEXERIL) 5 mg tablet Take 5 mg by mouth every eight (8) hours as needed for Muscle Spasm(s).  sertraline (ZOLOFT) 50 mg tablet TAKE ONE TABLET BY MOUTH ONCE DAILY 30 Tab 0    diazepam (VALIUM) 5 mg tablet Take 5 mg by mouth every six (6) hours as needed for Anxiety.          Past Medical History:   Diagnosis Date    Acute bronchitis     Acute upper respiratory infections of unspecified site     Cough     Essential hypertension, benign     Meniere's disease, unspecified     Muscle pain     Pain in limb        Past Surgical History:   Procedure Laterality Date    HX HYSTERECTOMY      HX MOHS PROCEDURES Left        Social History     Socioeconomic History    Marital status:      Spouse name: Not on file    Number of children: Not on file    Years of education: Not on file    Highest education level: Not on file   Occupational History    Not on file   Social Needs    Financial resource strain: Not on file    Food insecurity:     Worry: Not on file     Inability: Not on file    Transportation needs:     Medical: Not on file     Non-medical: Not on file   Tobacco Use    Smoking status: Never Smoker    Smokeless tobacco: Never Used   Substance and Sexual Activity    Alcohol use: No    Drug use: No    Sexual activity: Not Currently   Lifestyle    Physical activity:     Days per week: Not on file     Minutes per session: Not on file    Stress: Not on file   Relationships    Social connections:     Talks on phone: Not on file     Gets together: Not on file     Attends Spiritism service: Not on file     Active member of club or organization: Not on file     Attends meetings of clubs or organizations: Not on file     Relationship status: Not on file    Intimate partner violence:     Fear of current or ex partner: Not on file     Emotionally abused: Not on file     Physically abused: Not on file     Forced sexual activity: Not on file   Other Topics Concern    Not on file   Social History Narrative    Not on file       Patient does not have an advanced directive on file    Vitals:    05/08/19 1051   BP: 112/50   Pulse: 69   Resp: 18   Temp: 99.8 °F (37.7 °C)   TempSrc: Tympanic   SpO2: 93%   Weight: 148 lb (67.1 kg)   Height: 5' 5\" (1.651 m)   PainSc:   7   PainLoc: Back       Physical Exam   Cardiovascular: Normal rate, regular rhythm and normal heart sounds. Pulmonary/Chest: No accessory muscle usage. No respiratory distress. She has rales in the right upper field, the right middle field, the right lower field, the left upper field, the left middle field and the left lower field. Nursing note and vitals reviewed. No visits with results within 3 Month(s) from this visit. Latest known visit with results is:   Hospital Outpatient Visit on 08/07/2017   Component Date Value Ref Range Status    RPR 08/07/2017 REACTIVE* NR   Final    T. pallidum Ab (FTA-Ab) 08/07/2017 Reactive* Non Reactive   Final    Comment: (NOTE)  Performed At: [address] [id number]  Katia Saab MD [id number]      RPR titer 08/07/2017 1:4   Final       .No results found for any visits on 05/08/19. Assessment / Plan:      ICD-10-CM ICD-9-CM    1. Lower respiratory infection J22 519.8 XR CHEST PA LAT      azithromycin (ZITHROMAX) 250 mg tablet      predniSONE (DELTASONE) 20 mg tablet   2. Cough R05 786.2    3. Essential hypertension, benign I10 401. 1      Chest x-ray ordered  Zithromax 250 mg tablet given  Cough-prednisone 20 mg taper dose given  Hypertension-blood pressure controlled at 112/50 today. Patient instructed to avoid medications that contain Sudafed to avoid increase in blood pressure. Patient asked to follow-up in 5 days    Follow-up and Dispositions    · Return in about 5 days (around 5/13/2019).          I asked the patient if she  had any questions and answered her questions. The patient stated that she understands the treatment plan and agrees with the treatment plan    This document was created with a voice activated dictation system and may contain transcription errors.

## 2019-05-10 ENCOUNTER — TELEPHONE (OUTPATIENT)
Dept: FAMILY MEDICINE CLINIC | Facility: CLINIC | Age: 84
End: 2019-05-10

## 2019-05-13 ENCOUNTER — OFFICE VISIT (OUTPATIENT)
Dept: FAMILY MEDICINE CLINIC | Facility: CLINIC | Age: 84
End: 2019-05-13

## 2019-05-13 VITALS
HEIGHT: 65 IN | WEIGHT: 143.6 LBS | RESPIRATION RATE: 16 BRPM | SYSTOLIC BLOOD PRESSURE: 120 MMHG | BODY MASS INDEX: 23.93 KG/M2 | OXYGEN SATURATION: 95 % | TEMPERATURE: 98 F | DIASTOLIC BLOOD PRESSURE: 50 MMHG | HEART RATE: 65 BPM

## 2019-05-13 DIAGNOSIS — R05.9 COUGH: ICD-10-CM

## 2019-05-13 DIAGNOSIS — S81.811A SKIN TEAR OF RIGHT LOWER LEG WITHOUT COMPLICATION, INITIAL ENCOUNTER: ICD-10-CM

## 2019-05-13 DIAGNOSIS — J84.9 ILD (INTERSTITIAL LUNG DISEASE) (HCC): Primary | ICD-10-CM

## 2019-05-13 DIAGNOSIS — R06.09 DYSPNEA ON EXERTION: ICD-10-CM

## 2019-05-13 RX ORDER — SULFAMETHOXAZOLE AND TRIMETHOPRIM 800; 160 MG/1; MG/1
1 TABLET ORAL 2 TIMES DAILY
Qty: 20 TAB | Refills: 0 | Status: SHIPPED | OUTPATIENT
Start: 2019-05-13 | End: 2019-05-23

## 2019-05-13 NOTE — PROGRESS NOTES
Lisette Guzman is a 80 y.o. female presenting today for Annual Wellness Visit and Cough (follow up)  . HPI:  Lisette Guzman presents to the office today for cough, dyspnea, nasal congestion and nonproductive cough. Patient was seen in the practice on May 8, 2019 for nonproductive cough, nasal congestion and fever. She was febrile and had a temp of 99.8. Patient was diagnosed with a lower respiratory tract infection chest x-ray was ordered, she was given Zithromax 250 mg tablet and a prednisone taper. She was also asked to follow-up today for evaluation. Patient presents today stating she does not feel any better. In fact she feels like her cough is worse. She is afebrile and her temperature is 98 today. She continues to take prednisone and a Zithromax antibiotic. She is negative for any chest pain, palpitation, dizziness or headache. She did have a chest x-ray done prior to today's visit which showed chronic interstitial lung disease and fibrosis which were fairly stable. There was no acute abnormality. Review of Systems   Constitutional: Negative for chills and fever. HENT: Negative for congestion, ear pain and sore throat. Respiratory: Positive for cough and sputum production. Shortness of breath: with exertion. Cardiovascular: Negative for chest pain, palpitations and leg swelling. Neurological: Negative for dizziness and headaches. Allergies   Allergen Reactions    Penicillin V Potassium Diarrhea     States not allergic    Shellfish Derived Swelling       Current Outpatient Medications   Medication Sig Dispense Refill    trimethoprim-sulfamethoxazole (BACTRIM DS, SEPTRA DS) 160-800 mg per tablet Take 1 Tab by mouth two (2) times a day for 10 days. STOP Zithromax 20 Tab 0    HYDROcodone-acetaminophen (NORCO) 5-325 mg per tablet Take 1 Tab by mouth every four (4) hours as needed for Pain.       predniSONE (DELTASONE) 20 mg tablet 2 tabs daily x 2 days, 1 tab daily x 2 days, 1/2 tab daily x 4 days 8 Tab 0    sertraline (ZOLOFT) 50 mg tablet TAKE 1 TABLET BY MOUTH ONCE DAILY 90 Tab 3    hydroCHLOROthiazide (MICROZIDE) 12.5 mg capsule TAKE 1 CAPSULE BY MOUTH ONCE DAILY IN THE MORNING 90 Cap 0    diazePAM (VALIUM) 2 mg tablet 1/2 to 1 tab every 8 hours as needed for dizziness 50 Tab 1    cyclobenzaprine (FLEXERIL) 5 mg tablet Take 5 mg by mouth every eight (8) hours as needed for Muscle Spasm(s).  REVLIMID 25 mg cap       aspirin delayed-release 81 mg tablet 81 mg.      gabapentin (NEURONTIN) 100 mg capsule 1 cap three times per day for 2 weeks 50 Cap 0    clotrimazole-betamethasone (LOTRISONE) topical cream APPLY TO AFFECTED AREA TWICE PER DAY 45 g 2    acetaminophen (TYLENOL) 325 mg tablet Take  by mouth every four (4) hours as needed for Pain.  sertraline (ZOLOFT) 50 mg tablet TAKE ONE TABLET BY MOUTH ONCE DAILY 30 Tab 0    cyanocobalamin 1,000 mcg tablet Take 1,000 mcg by mouth daily.  diazepam (VALIUM) 5 mg tablet Take 5 mg by mouth every six (6) hours as needed for Anxiety.       azithromycin (ZITHROMAX) 250 mg tablet Take 2 tablets today, then take 1 tablet daily 6 Tab 0       Past Medical History:   Diagnosis Date    Acute bronchitis     Acute upper respiratory infections of unspecified site     Cough     Essential hypertension, benign     Meniere's disease, unspecified     Muscle pain     Pain in limb        Past Surgical History:   Procedure Laterality Date    HX HYSTERECTOMY      HX MOHS PROCEDURES Left        Social History     Socioeconomic History    Marital status:      Spouse name: Not on file    Number of children: Not on file    Years of education: Not on file    Highest education level: Not on file   Occupational History    Not on file   Social Needs    Financial resource strain: Not on file    Food insecurity:     Worry: Not on file     Inability: Not on file    Transportation needs:     Medical: Not on file Non-medical: Not on file   Tobacco Use    Smoking status: Never Smoker    Smokeless tobacco: Never Used   Substance and Sexual Activity    Alcohol use: No    Drug use: No    Sexual activity: Not Currently   Lifestyle    Physical activity:     Days per week: Not on file     Minutes per session: Not on file    Stress: Not on file   Relationships    Social connections:     Talks on phone: Not on file     Gets together: Not on file     Attends Pentecostal service: Not on file     Active member of club or organization: Not on file     Attends meetings of clubs or organizations: Not on file     Relationship status: Not on file    Intimate partner violence:     Fear of current or ex partner: Not on file     Emotionally abused: Not on file     Physically abused: Not on file     Forced sexual activity: Not on file   Other Topics Concern    Not on file   Social History Narrative    Not on file       Patient does have an advanced directive on file    Vitals:    05/13/19 1128   BP: 120/50   Pulse: 65   Resp: 16   Temp: 98 °F (36.7 °C)   TempSrc: Tympanic   SpO2: 95%   Weight: 143 lb 9.6 oz (65.1 kg)   Height: 5' 5\" (1.651 m)   PainSc:   0 - No pain       Physical Exam   Cardiovascular: Normal rate, regular rhythm and normal heart sounds. Pulmonary/Chest: Effort normal. She has wheezes in the right lower field and the left lower field. She has rhonchi in the right upper field, the right middle field, the left upper field, the left middle field and the left lower field. She has rales in the right upper field, the right middle field, the right lower field, the left upper field, the left middle field and the left lower field. Lymphadenopathy:     She has no cervical adenopathy. Neurological: She is alert. Nursing note and vitals reviewed. No visits with results within 3 Month(s) from this visit.    Latest known visit with results is:   Hospital Outpatient Visit on 08/07/2017   Component Date Value Ref Range Status    RPR 08/07/2017 REACTIVE* NR   Final    T. pallidum Ab (FTA-Ab) 08/07/2017 Reactive* Non Reactive   Final    Comment: (NOTE)  Performed At: [address] [id number]  Paris Parikh MD [id number]      RPR titer 08/07/2017 1:4   Final       .No results found for any visits on 05/13/19. Assessment / Plan:      ICD-10-CM ICD-9-CM    1. ILD (interstitial lung disease) ([facility]) J84.9 515 trimethoprim-sulfamethoxazole (BACTRIM DS, SEPTRA DS) 160-800 mg per tablet      REFERRAL TO PULMONARY DISEASE   2. Cough R05 786.2 trimethoprim-sulfamethoxazole (BACTRIM DS, SEPTRA DS) 160-800 mg per tablet      REFERRAL TO PULMONARY DISEASE   3. Dyspnea on exertion R06.09 786.09 trimethoprim-sulfamethoxazole (BACTRIM DS, SEPTRA DS) 160-800 mg per tablet      REFERRAL TO PULMONARY DISEASE   4. Skin tear of right lower leg without complication, initial encounter S81.811A 891.0        Interstitial lung disease-Bactrim DS twice daily x10 days, patient to stop Zithromax  Referral to pulmonary for interstitial lung disease  Betadine applied to right leg skin tear. MMSE screening- score 25  Follow-up on Friday or sooner if symptoms worsen. Follow-up and Dispositions    · Return if symptoms worsen or fail to improve. I asked the patient if she  had any questions and answered her  questions. The patient stated that she understands the treatment plan and agrees with the treatment plan    This document was created with a voice activated dictation system and may contain transcription errors. This is the Subsequent Medicare Annual Wellness Exam, performed 12 months or more after the Initial AWV or the last Subsequent AWV    I have reviewed the patient's medical history in detail and updated the computerized patient record.      History     Past Medical History:   Diagnosis Date    Acute bronchitis     Acute upper respiratory infections of unspecified site     Cough     Essential hypertension, benign     Meniere's disease, unspecified     Muscle pain     Pain in limb       Past Surgical History:   Procedure Laterality Date    HX HYSTERECTOMY      HX MOHS PROCEDURES Left      Current Outpatient Medications   Medication Sig Dispense Refill    trimethoprim-sulfamethoxazole (BACTRIM DS, SEPTRA DS) 160-800 mg per tablet Take 1 Tab by mouth two (2) times a day for 10 days. STOP Zithromax 20 Tab 0    HYDROcodone-acetaminophen (NORCO) 5-325 mg per tablet Take 1 Tab by mouth every four (4) hours as needed for Pain.  predniSONE (DELTASONE) 20 mg tablet 2 tabs daily x 2 days, 1 tab daily x 2 days, 1/2 tab daily x 4 days 8 Tab 0    sertraline (ZOLOFT) 50 mg tablet TAKE 1 TABLET BY MOUTH ONCE DAILY 90 Tab 3    hydroCHLOROthiazide (MICROZIDE) 12.5 mg capsule TAKE 1 CAPSULE BY MOUTH ONCE DAILY IN THE MORNING 90 Cap 0    diazePAM (VALIUM) 2 mg tablet 1/2 to 1 tab every 8 hours as needed for dizziness 50 Tab 1    cyclobenzaprine (FLEXERIL) 5 mg tablet Take 5 mg by mouth every eight (8) hours as needed for Muscle Spasm(s).  REVLIMID 25 mg cap       aspirin delayed-release 81 mg tablet 81 mg.      gabapentin (NEURONTIN) 100 mg capsule 1 cap three times per day for 2 weeks 50 Cap 0    clotrimazole-betamethasone (LOTRISONE) topical cream APPLY TO AFFECTED AREA TWICE PER DAY 45 g 2    acetaminophen (TYLENOL) 325 mg tablet Take  by mouth every four (4) hours as needed for Pain.  sertraline (ZOLOFT) 50 mg tablet TAKE ONE TABLET BY MOUTH ONCE DAILY 30 Tab 0    cyanocobalamin 1,000 mcg tablet Take 1,000 mcg by mouth daily.  diazepam (VALIUM) 5 mg tablet Take 5 mg by mouth every six (6) hours as needed for Anxiety.       azithromycin (ZITHROMAX) 250 mg tablet Take 2 tablets today, then take 1 tablet daily 6 Tab 0     Allergies   Allergen Reactions    Penicillin V Potassium Diarrhea     States not allergic    Shellfish Derived Swelling     Family History   Problem Relation Age of Onset    Cancer Father         LUNG     Social History     Tobacco Use    Smoking status: Never Smoker    Smokeless tobacco: Never Used   Substance Use Topics    Alcohol use: No     Patient Active Problem List   Diagnosis Code    Pain in limb M79.609    Essential hypertension, benign I10    Meniere's disease, unspecified H81.09    Spinal stenosis of lumbar region M48.061    Unresolved grief F43.21    [facility] J84.9    Syphilis, latent A53.0    Multiple myeloma in remission ([facility] C90.01       Depression Risk Factor Screening:     3 most recent PHQ Screens 10/20/2016   Little interest or pleasure in doing things More than half the days   Feeling down, depressed, irritable, or hopeless More than half the days   Total Score PHQ 2 4   Trouble falling or staying asleep, or sleeping too much Nearly every day   Feeling tired or having little energy Nearly every day   Poor appetite, weight loss, or overeating Not at all   Feeling bad about yourself - or that you are a failure or have let yourself or your family down Not at all   Trouble concentrating on things such as school, work, reading, or watching TV Not at all   Moving or speaking so slowly that other people could have noticed; or the opposite being so fidgety that others notice Several days   Thoughts of being better off dead, or hurting yourself in some way Not at all   PHQ 9 Score 11   How difficult have these problems made it for you to do your work, take care of your home and get along with others Not difficult at all     Alcohol Risk Factor Screening: You do not drink alcohol or very rarely. Functional Ability and Level of Safety:   Hearing Loss  The patient wears hearing aids. Activities of Daily Living  The home contains: handrails and grab bars  Patient does total self care    Fall Risk  Fall Risk Assessment, last 12 mths 3/22/2019   Able to walk? Yes   Fall in past 12 months?  Yes   Fall with injury? Yes   Number of falls in past 12 months 1   Fall Risk Score 2       Abuse Screen  Patient is not abused    Cognitive Screening   Evaluation of Cognitive Function:  Has your family/caregiver stated any concerns about your memory: yes  Normal, [address]    Patient Care Team   Patient Care Team:  José Antonio Rudolph MD as PCP - General (Internal Medicine)  Bronwyn Gottron, MD (Pulmonary Disease)    Assessment/Plan   Education and counseling provided:  Are appropriate based on today's review and evaluation    Diagnoses and all orders for this visit:    1. ILD (interstitial lung disease) (HCC)  -     trimethoprim-sulfamethoxazole (BACTRIM DS, SEPTRA DS) 160-800 mg per tablet; Take 1 Tab by mouth two (2) times a day for 10 days. STOP Zithromax  -     REFERRAL TO PULMONARY DISEASE    2. Cough  -     trimethoprim-sulfamethoxazole (BACTRIM DS, SEPTRA DS) 160-800 mg per tablet; Take 1 Tab by mouth two (2) times a day for 10 days. STOP Zithromax  -     REFERRAL TO PULMONARY DISEASE    3. Dyspnea on exertion  -     trimethoprim-sulfamethoxazole (BACTRIM DS, SEPTRA DS) 160-800 mg per tablet; Take 1 Tab by mouth two (2) times a day for 10 days. STOP Zithromax  -     REFERRAL TO PULMONARY DISEASE    4.  Skin tear of right lower leg without complication, initial encounter        Health Maintenance Due   Topic Date Due    Shingrix Vaccine Age 49> (1 of 2) 07/23/1981    Bone Densitometry (Dexa) Screening  07/23/1996    Pneumococcal 65+ years (2 of 2 - PCV13) 09/19/2012    DTaP/Tdap/Td series (1 - Tdap) 08/17/2018    BREAST CANCER SCRN MAMMOGRAM  08/24/2018    MEDICARE YEARLY EXAM  04/25/2019

## 2019-05-21 ENCOUNTER — OFFICE VISIT (OUTPATIENT)
Dept: PULMONOLOGY | Age: 84
End: 2019-05-21

## 2019-05-21 VITALS
SYSTOLIC BLOOD PRESSURE: 140 MMHG | HEART RATE: 78 BPM | BODY MASS INDEX: 23.66 KG/M2 | HEIGHT: 65 IN | WEIGHT: 142 LBS | RESPIRATION RATE: 18 BRPM | TEMPERATURE: 98.1 F | DIASTOLIC BLOOD PRESSURE: 72 MMHG | OXYGEN SATURATION: 99 %

## 2019-05-21 DIAGNOSIS — R06.09 DOE (DYSPNEA ON EXERTION): ICD-10-CM

## 2019-05-21 DIAGNOSIS — I10 HYPERTENSION, UNSPECIFIED TYPE: ICD-10-CM

## 2019-05-21 DIAGNOSIS — J84.9 INTERSTITIAL LUNG DISEASE (HCC): ICD-10-CM

## 2019-05-21 DIAGNOSIS — J84.9 ILD (INTERSTITIAL LUNG DISEASE) (HCC): ICD-10-CM

## 2019-05-21 DIAGNOSIS — R05.9 COUGH: Primary | ICD-10-CM

## 2019-05-21 RX ORDER — ALBUTEROL SULFATE 90 UG/1
2 AEROSOL, METERED RESPIRATORY (INHALATION)
Qty: 1 INHALER | Refills: 3 | Status: SHIPPED | OUTPATIENT
Start: 2019-05-21 | End: 2019-06-20

## 2019-05-21 NOTE — PROGRESS NOTES
Verbal Order with read back per Bianca Sims NP, For PFT smart panel. AMB POC PFT complete w/ bronchodilator  AMB POC PFT complete w/o bronchodilator    Bianca Sims NP, will co-sign the orders.

## 2019-05-21 NOTE — PROGRESS NOTES
[facility] [place] [place] [facility]  Pulmonary, Critical Care, and Sleep Medicine      Pulmonary Office Progress Notes    Name: Quyen Torres     : 1931     Date: 2019        Subjective:     2019          HPI    Quyen Torres  is a 80 y.o. female with PMH of bronchitis / ILD and multiple myeloma who presents for evaluation of cough. She states that she became sick over a month ago with bronchitis and is still coughing after several courses of abx and prednisone. Today she appears comfortable, in no distress, and states that she continues to have a lingering cough daily, intermittently, that is productive of yellow / white sputum. She states she has some wheezing and SOB with exertion however this appears to be improving. She denies chest pain or hemoptysis. No fever, chills or orthopnea; no leg / calf pain or swelling and no decreased appetite or weight loss. She denies nasal congestion / drainage or sinus pressure / pain. Her CXR on  19 demonstrated stable chronic interstitial changes / fibrosis bilaterally with no new changes. Her PFT's from 11/10/16 showed normal flows with reduced diffusion capacity. Past Medical History:   Diagnosis Date    Acute bronchitis     Acute upper respiratory infections of unspecified site     Cough     Essential hypertension, benign     Meniere's disease, unspecified     Muscle pain     Pain in limb        Allergies   Allergen Reactions    Penicillin V Potassium Diarrhea     States not allergic    Shellfish Derived Swelling       Current Outpatient Medications   Medication Sig Dispense Refill    albuterol (PROVENTIL HFA, VENTOLIN HFA, PROAIR HFA) 90 mcg/actuation inhaler Take 2 Puffs by inhalation every six (6) hours as needed for Wheezing for up to 30 days. 1 Inhaler 3    trimethoprim-sulfamethoxazole (BACTRIM DS, SEPTRA DS) 160-800 mg per tablet Take 1 Tab by mouth two (2) times a day for 10 days.  STOP Zithromax 20 Tab 0  HYDROcodone-acetaminophen (NORCO) 5-325 mg per tablet Take 1 Tab by mouth every four (4) hours as needed for Pain.  sertraline (ZOLOFT) 50 mg tablet TAKE 1 TABLET BY MOUTH ONCE DAILY 90 Tab 3    hydroCHLOROthiazide (MICROZIDE) 12.5 mg capsule TAKE 1 CAPSULE BY MOUTH ONCE DAILY IN THE MORNING 90 Cap 0    diazePAM (VALIUM) 2 mg tablet 1/2 to 1 tab every 8 hours as needed for dizziness 50 Tab 1    cyclobenzaprine (FLEXERIL) 5 mg tablet Take 5 mg by mouth every eight (8) hours as needed for Muscle Spasm(s).  REVLIMID 25 mg cap       aspirin delayed-release 81 mg tablet 81 mg.      gabapentin (NEURONTIN) 100 mg capsule 1 cap three times per day for 2 weeks 50 Cap 0    clotrimazole-betamethasone (LOTRISONE) topical cream APPLY TO AFFECTED AREA TWICE PER DAY 45 g 2    acetaminophen (TYLENOL) 325 mg tablet Take  by mouth every four (4) hours as needed for Pain.  cyanocobalamin 1,000 mcg tablet Take 1,000 mcg by mouth daily.  diazepam (VALIUM) 5 mg tablet Take 5 mg by mouth every six (6) hours as needed for Anxiety.  azithromycin (ZITHROMAX) 250 mg tablet Take 2 tablets today, then take 1 tablet daily 6 Tab 0    predniSONE (DELTASONE) 20 mg tablet 2 tabs daily x 2 days, 1 tab daily x 2 days, 1/2 tab daily x 4 days 8 Tab 0    sertraline (ZOLOFT) 50 mg tablet TAKE ONE TABLET BY MOUTH ONCE DAILY 30 Tab 0       Review of Systems:    HEENT: No epistaxis, no nasal drainage, no difficulty in swallowing, no redness in eyes  Respiratory: As stated above in HPI  Cardiovascular: no chest pain, no palpitations, no chronic leg edema, no syncope  Gastrointestinal: no abd pain, no vomiting, no diarrhea, no bleeding symptoms  Genitourinary: No urinary symptoms or hematuria  Integument/breast: No ulcers or rashes  Musculoskeletal: No leg / calf pain  Neurological: No focal weakness, no seizures, no headaches  Behvioral/Psych: No anxiety, no depression  Constitutional: No fever, chills or night sweats. No decreased appetite or weight loss     Objective:     Visit Vitals  /72 (BP 1 Location: Left arm, BP Patient Position: Sitting)   Pulse 78   Temp 98.1 °F (36.7 °C)   Resp 18   Ht 5' 5\" (1.651 m)   Wt 64.4 kg (142 lb)   SpO2 99%   BMI 23.63 kg/m²        PHYSICAL EXAM      General: Oriented to person, place, and time. Well-developed, well-nourished, and in no distress      Head:   Normocephalic, without obvious abnormality, atraumatic       Eyes:   Pupils reactive, conjunctivae / corneas clear. EOM's intact, no scleral icterus       Nose:   Nares normal, no drainage. Throat:    Lips, mucosa and tongue normal. Teeth and gums normal       Neck:   Supple, symmetrical, trachea midline. No adenopathy or thyroid swelling; no carotid bruit or JVD. CVS:    Regular rate and rhythm. S1S2 normal,  no murmurs       RS:      Symmetrical chest rise, good AE bilaterally. No wheezing or rhonchi, no accessory muscle use. Mild rales to bilateral LL      Abd:     Soft, non-tender. No hepatosplenomegaly                                                     Neuro:   non focal, awake, alert and oriented to person, place, time and situation    Extrm:   no leg edema,  clubbing or cyanosis       Skin:   no rash    Data review:     No visits with results within 6 Month(s) from this visit.    Latest known visit with results is:   Hospital Outpatient Visit on 08/07/2017   Component Date Value Ref Range Status    RPR 08/07/2017 REACTIVE* NR   Final    T. pallidum Ab (FTA-Ab) 08/07/2017 Reactive* Non Reactive   Final    Comment: (NOTE)  Performed At: [address] [id number]  Emma Branham MD [id number]      RPR titer 08/07/2017 1:4   Final                   PULMONARY FUNCTION TESTS    Date FVC FEV1  FEV1/FVC [id number] TLC RV RV/TLC VC DLCO   11/10/16 1035 124% 88 357% 82% 64% 78% 102% 11.33  62%                                             Imaging:  I have personally reviewed the patients radiographs and have reviewed the reports:  XR Results (most recent):  Results from Hospital Encounter encounter on 05/03/19   XR HIP LT W OR WO PELV 2-3 VWS    Narrative EXAM: XR HIP LT W OR WO PELV 2-3 VWS    CLINICAL INDICATION/HISTORY : Pain in left hip. COMPARISON: December 7, 2018    TECHNIQUE: 2 VIEWS    FINDINGS: Degenerative changes of the visualized lower lumbar spine. Moderate  right and mild left sacroiliac joint sclerosis. No evidence of fracture. Joint  spaces are intact. No dislocation. Impression IMPRESSION:    1. No acute findings. 2.  Degenerative changes of the lower lumbar spine and sacroiliac joints as  above     CT Results (most recent):  Results from Hospital Encounter encounter on 03/24/17   CT CHEST ABD PELV WO CONT    Narrative Description:  CT chest, abdomen and pelvis without IV contrast.    TECHNIQUE: Helically acquired axial CT imaging of the chest, abdomen and pelvis  without IV contrast was performed. Oral contrast was given. Coronal and  sagittal reformations generated and reviewed. All CT scans at this facility are performed using dose optimization technique as  appropriate to a performed exam, to include automated exposure control,  adjustment of the mA and/or kV according to patient size (including appropriate  matching for site-specific examinations), or use of iterative reconstruction  technique. Clinical Indication:  Weight loss. History of interstitial lung disease. Comparison: Chest CT, December 7, 2016. Esophagram, December 7, 2016    Findings:      CT CHEST:  Visualized thyroid unremarkable. Thoracic aorta nonaneurysmal. Mild atherosclerosis. No mediastinal or hilar adenopathy. No pericardial effusion. Moderate subpleural intralobular septal thickening. No definite pulmonary infiltrate or mass. The central airway is patent.     CT ABDOMEN:  The liver, spleen, pancreas and adrenal glands appear normal.  The kidneys have normal morphology. No stones. No hydronephrosis. There is prominent soft tissue at the gastroesophageal junction and at the  gastric cardia (series 2, #45). The mid and distal stomach appear normal.  No hyperdense gallstones are seen. The abdominal aorta is nonaneurysmal. Mild atherosclerosis is present. No mesenteric or retroperitoneal adenopathy. No peritoneal free fluid or air. CT PELVIS:  The bladder is not well-distended but has a normal unopacified appearance  otherwise. No pelvic mass, adenopathy or free fluid. The appendix appears normal.    Skeleton/soft tissues:  Osteoporosis. Multilevel thoracolumbar spondylosis. No acute osseous findings. Impression Impression:      1. Prominent soft tissue at the gastroesophageal junction. No adjacent  adenopathy.   - On the comparison CT scan of the chest, which was performed with IV contrast,  this appears to be simple hiatal hernia. - The previously barium swallow also showed no evidence of obstruction at this  level. - Despite this, given reported symptoms of dysphagia and weight loss, and the  appearance on this CT scan, direct visualization with endoscopy may be  warranted, if not previously performed. 2. Pulmonary interstitial fibrosis. Patient Active Problem List   Diagnosis Code    Pain in limb M79.609    Essential hypertension, benign I10    Meniere's disease, unspecified H81.09    Spinal stenosis of lumbar region M48.061    Unresolved grief F43.21    ILD (interstitial lung disease) ([address] J84.9    Syphilis, latent A53.0    Multiple myeloma in remission ([facility] C90.01       IMPRESSION:   · ILD - previously well controlled without exacerbations, last CT 2017  · Cough - x 2 months, not improved with several courses of abx and prednisone  · Multiple myeloma - currently receiving chemo treatments  · HTN      RECOMMENDATIONS:   · Continue spiriva respimat, 2 inhalations BID, rinse mouth out after use.  Discussed appropriate use of respiratory maintenance medications with patient  · Start albuterol MDI every 4-6 hours as needed for increased shortness of breath, wheezing or cough - discussed appropriate use of rescue inhaler with patient  · Sputum gram stain ordered - instructions provided  · CT chest ordered   · Bronchial hygiene - patient instructed about airway clearance  · OTC mucinex PRN for cough  · Repeat PFTs in near future  · Increase PO fluids  · Follow up in pulmonary clinic in  one month   or sooner with worsening of symptoms  · Report to ER with chest pain or difficulty breathing.         Frances Dempsey NP

## 2019-05-21 NOTE — PATIENT INSTRUCTIONS
? Continue spiriva respimat daily,  rinse mouth out after use ? start albuterol MDI every 4-6 hours as needed for shortness of breath, wheezing, or cough · Over the counter mucinex as needed for cough · Continue to increase fluids · Submit sputum specimen to lab - order placed · Complete CT chest - order placed ? Follow-up in pulmonary clinic in one month or sooner with worsening of symptoms ? Report to the ER with chest pain or difficulty breathing

## 2019-05-24 LAB
BACTERIA SPT CULT: NORMAL
GRAM STN SPT: NORMAL
SQUAMOUS SPT QL MICRO: NORMAL
WBC SPT QL MICRO: NORMAL

## 2019-06-10 DIAGNOSIS — J22 LOWER RESPIRATORY INFECTION: ICD-10-CM

## 2019-07-01 RX ORDER — HYDROCHLOROTHIAZIDE 12.5 MG/1
CAPSULE ORAL
Qty: 90 CAP | Refills: 0 | Status: SHIPPED | OUTPATIENT
Start: 2019-07-01 | End: 2019-10-12 | Stop reason: SDUPTHER

## 2019-07-12 ENCOUNTER — OFFICE VISIT (OUTPATIENT)
Dept: FAMILY MEDICINE CLINIC | Facility: CLINIC | Age: 84
End: 2019-07-12

## 2019-07-12 VITALS
RESPIRATION RATE: 14 BRPM | BODY MASS INDEX: 23.82 KG/M2 | HEART RATE: 64 BPM | WEIGHT: 143 LBS | OXYGEN SATURATION: 96 % | DIASTOLIC BLOOD PRESSURE: 60 MMHG | TEMPERATURE: 98.3 F | HEIGHT: 65 IN | SYSTOLIC BLOOD PRESSURE: 138 MMHG

## 2019-07-12 DIAGNOSIS — I10 ESSENTIAL HYPERTENSION, BENIGN: Primary | ICD-10-CM

## 2019-07-12 DIAGNOSIS — Z00.00 MEDICARE ANNUAL WELLNESS VISIT, SUBSEQUENT: ICD-10-CM

## 2019-07-12 RX ORDER — CLOTRIMAZOLE AND BETAMETHASONE DIPROPIONATE 10; .64 MG/G; MG/G
CREAM TOPICAL
Qty: 45 G | Refills: 2 | Status: SHIPPED | OUTPATIENT
Start: 2019-07-12 | End: 2019-10-07 | Stop reason: ALTCHOICE

## 2019-07-12 NOTE — PROGRESS NOTES
This is the Subsequent Medicare Annual Wellness Exam, performed 12 months or more after the Initial AWV or the last Subsequent AWV    I have reviewed the patient's medical history in detail and updated the computerized patient record. History   The patient remains on hydrochlorothiazide for hypertension. She is doing well on the medication. The patient does have an element of depression since the death of her . The patient remains on sertraline with a good response. The patient remains on chemotherapy for multiple myeloma. She is doing well therapy. Feelings however is slowly progressing on therapy. Past Medical History:   Diagnosis Date    Acute bronchitis     Acute upper respiratory infections of unspecified site     Cough     Essential hypertension, benign     Meniere's disease, unspecified     Muscle pain     Pain in limb       Past Surgical History:   Procedure Laterality Date    HX HYSTERECTOMY      HX MOHS PROCEDURES Left      Current Outpatient Medications   Medication Sig Dispense Refill    varicella-zoster recombinant, PF, (SHINGRIX, PF,) 50 mcg/0.5 mL susr injection 0.5 ml IM for the first dose. Repeat in 3 months 0.5 mL 1    clotrimazole-betamethasone (LOTRISONE) topical cream APPLY TO AFFECTED AREA TWICE PER DAY 45 g 2    hydroCHLOROthiazide (MICROZIDE) 12.5 mg capsule TAKE 1 CAPSULE BY MOUTH ONCE DAILY IN THE MORNING 90 Cap 0    HYDROcodone-acetaminophen (NORCO) 5-325 mg per tablet Take 1 Tab by mouth every four (4) hours as needed for Pain.  REVLIMID 25 mg cap       aspirin delayed-release 81 mg tablet 81 mg.      acetaminophen (TYLENOL) 325 mg tablet Take  by mouth every four (4) hours as needed for Pain.  sertraline (ZOLOFT) 50 mg tablet TAKE ONE TABLET BY MOUTH ONCE DAILY 30 Tab 0    cyanocobalamin 1,000 mcg tablet Take 1,000 mcg by mouth daily.  diazepam (VALIUM) 5 mg tablet Take 5 mg by mouth every six (6) hours as needed for Anxiety. Allergies   Allergen Reactions    Penicillin V Potassium Diarrhea     States not allergic    Shellfish Derived Swelling     Family History   Problem Relation Age of Onset    Cancer Father         LUNG     Social History     Tobacco Use    Smoking status: Never Smoker    Smokeless tobacco: Never Used   Substance Use Topics    Alcohol use: No     Patient Active Problem List   Diagnosis Code    Pain in limb M79.609    Essential hypertension, benign I10    Meniere's disease, unspecified H81.09    Spinal stenosis of lumbar region M48.061    Unresolved grief F43.21    ILD (interstitial lung disease) ([facility] J84.9    Syphilis, latent A53.0    Multiple myeloma in remission ([facility] C90.01       Depression Risk Factor Screening:     3 most recent PHQ Screens 10/20/2016   Little interest or pleasure in doing things More than half the days   Feeling down, depressed, irritable, or hopeless More than half the days   Total Score PHQ 2 4   Trouble falling or staying asleep, or sleeping too much Nearly every day   Feeling tired or having little energy Nearly every day   Poor appetite, weight loss, or overeating Not at all   Feeling bad about yourself - or that you are a failure or have let yourself or your family down Not at all   Trouble concentrating on things such as school, work, reading, or watching TV Not at all   Moving or speaking so slowly that other people could have noticed; or the opposite being so fidgety that others notice Several days   Thoughts of being better off dead, or hurting yourself in some way Not at all   PHQ 9 Score 11   How difficult have these problems made it for you to do your work, take care of your home and get along with others Not difficult at all     Alcohol Risk Factor Screening: You do not drink alcohol or very rarely. Functional Ability and Level of Safety:   Hearing Loss  Hearing is good. The patient wears hearing aids.     Activities of Daily Living  The home contains: no safety equipment. Patient does total self care    Fall Risk  Fall Risk Assessment, last 12 mths 7/12/2019   Able to walk? Yes   Fall in past 12 months? No   Fall with injury? -   Number of falls in past 12 months -   Fall Risk Score -       Abuse Screen  Patient is not abused    Cognitive Screening   Evaluation of Cognitive Function:  Has your family/caregiver stated any concerns about your memory: no  Normal    Patient Care Team   Patient Care Team:  Raissa Woodard MD as PCP - General (Internal Medicine)  Checo Gallegos MD (Pulmonary Disease)  Camille Liz NP (Nurse Practitioner)    Assessment/Plan   Education and counseling provided:  Are appropriate based on today's review and evaluation    Diagnoses and all orders for this visit:    1. Essential hypertension, benign    2. Medicare annual wellness visit, subsequent    Other orders  -     varicella-zoster recombinant, PF, (SHINGRIX, PF,) 50 mcg/0.5 mL susr injection; 0.5 ml IM for the first dose. Repeat in 3 months  -     clotrimazole-betamethasone (LOTRISONE) topical cream; APPLY TO AFFECTED AREA TWICE PER DAY        Health Maintenance Due   Topic Date Due    Shingrix Vaccine Age 49> (1 of 2) 07/23/1981    Bone Densitometry (Dexa) Screening  07/23/1996    Pneumococcal 65+ years (2 of 2 - PCV13) 09/19/2012    DTaP/Tdap/Td series (1 - Tdap) 08/17/2018    BREAST CANCER SCRN MAMMOGRAM  08/24/2018       she was advised to continue her maintenance medications          I asked Kervin Puente if she has any questions and I answered the questions. Matt Puente states that she understands the treatment plan and agrees with the treatment plan

## 2019-08-15 ENCOUNTER — HOSPITAL ENCOUNTER (OUTPATIENT)
Dept: LAB | Age: 84
Discharge: HOME | End: 2019-08-15
Payer: MEDICARE

## 2019-08-15 ENCOUNTER — OFFICE VISIT (OUTPATIENT)
Dept: FAMILY MEDICINE CLINIC | Facility: CLINIC | Age: 84
End: 2019-08-15

## 2019-08-15 VITALS
HEART RATE: 82 BPM | BODY MASS INDEX: 23.32 KG/M2 | SYSTOLIC BLOOD PRESSURE: 127 MMHG | OXYGEN SATURATION: 92 % | TEMPERATURE: 101.7 F | WEIGHT: 140 LBS | DIASTOLIC BLOOD PRESSURE: 42 MMHG | RESPIRATION RATE: 12 BRPM | HEIGHT: 65 IN

## 2019-08-15 DIAGNOSIS — C90.00 MULTIPLE MYELOMA NOT HAVING ACHIEVED REMISSION (HCC): ICD-10-CM

## 2019-08-15 DIAGNOSIS — J84.9 ILD (INTERSTITIAL LUNG DISEASE) (HCC): ICD-10-CM

## 2019-08-15 DIAGNOSIS — I10 ESSENTIAL HYPERTENSION, BENIGN: ICD-10-CM

## 2019-08-15 DIAGNOSIS — R50.9 FUO (FEVER OF UNKNOWN ORIGIN): Primary | ICD-10-CM

## 2019-08-15 DIAGNOSIS — R50.9 FUO (FEVER OF UNKNOWN ORIGIN): ICD-10-CM

## 2019-08-15 LAB
BASOPHILS # BLD: 0 K/UL (ref 0–0.1)
BASOPHILS NFR BLD: 0 % (ref 0–2)
DIFFERENTIAL METHOD BLD: ABNORMAL
EOSINOPHIL # BLD: 0.1 K/UL (ref 0–0.4)
EOSINOPHIL NFR BLD: 1 % (ref 0–5)
ERYTHROCYTE [DISTWIDTH] IN BLOOD BY AUTOMATED COUNT: 17.4 % (ref 11.6–14.5)
HCT VFR BLD AUTO: 31.3 % (ref 35–45)
HGB BLD-MCNC: 10.1 G/DL (ref 12–16)
LYMPHOCYTES # BLD: 0.7 K/UL (ref 0.9–3.6)
LYMPHOCYTES NFR BLD: 18 % (ref 21–52)
MCH RBC QN AUTO: 29.5 PG (ref 24–34)
MCHC RBC AUTO-ENTMCNC: 32.3 G/DL (ref 31–37)
MCV RBC AUTO: 91.5 FL (ref 74–97)
MONOCYTES # BLD: 0.6 K/UL (ref 0.05–1.2)
MONOCYTES NFR BLD: 16 % (ref 3–10)
NEUTS SEG # BLD: 2.6 K/UL (ref 1.8–8)
NEUTS SEG NFR BLD: 65 % (ref 40–73)
PLATELET # BLD AUTO: 81 K/UL (ref 135–420)
RBC # BLD AUTO: 3.42 M/UL (ref 4.2–5.3)
WBC # BLD AUTO: 4 K/UL (ref 4.6–13.2)

## 2019-08-15 PROCEDURE — 80053 COMPREHEN METABOLIC PANEL: CPT

## 2019-08-15 PROCEDURE — 85025 COMPLETE CBC W/AUTO DIFF WBC: CPT

## 2019-08-15 PROCEDURE — 36415 COLL VENOUS BLD VENIPUNCTURE: CPT

## 2019-08-15 RX ORDER — CEFUROXIME AXETIL 500 MG/1
500 TABLET ORAL 2 TIMES DAILY
Qty: 20 TAB | Refills: 0 | Status: SHIPPED | OUTPATIENT
Start: 2019-08-15 | End: 2019-09-02 | Stop reason: ALTCHOICE

## 2019-08-15 NOTE — PROGRESS NOTES
Evelio Pratt presents today for   Chief Complaint   Patient presents with    Follow-up     congestion, patient fell right before her appointmnet       Is someone accompanying this pt? no    Is the patient using any DME equipment during [address]? no    Depression Screening:  3 most recent PHQ Screens 10/20/2016   Little interest or pleasure in doing things More than half the days   Feeling down, depressed, irritable, or hopeless More than half the days   Total Score PHQ 2 4   Trouble falling or staying asleep, or sleeping too much Nearly every day   Feeling tired or having little energy Nearly every day   Poor appetite, weight loss, or overeating Not at all   Feeling bad about yourself - or that you are a failure or have let yourself or your family down Not at all   Trouble concentrating on things such as school, work, reading, or watching TV Not at all   Moving or speaking so slowly that other people could have noticed; or the opposite being so fidgety that others notice Several days   Thoughts of being better off dead, or hurting yourself in some way Not at all   PHQ 9 Score 11   How difficult have these problems made it for you to do your work, take care of your home and get along with others Not difficult at all       Learning Assessment:  Learning Assessment 1/8/2016   PRIMARY LEARNER Patient   HIGHEST LEVEL OF EDUCATION - PRIMARY LEARNER  GRADUATED HIGH SCHOOL OR GED   BARRIERS PRIMARY LEARNER NONE   CO-LEARNER CAREGIVER No   PRIMARY LANGUAGE ENGLISH    NEED No   LEARNER PREFERENCE PRIMARY VIDEOS     DEMONSTRATION     READING     PICTURES   ANSWERED BY patient   RELATIONSHIP SELF       Abuse Screening:  No flowsheet data found. Fall Risk  Fall Risk Assessment, last 12 mths 8/15/2019   Able to walk? Yes   Fall in past 12 months? Yes   Fall with injury? -   Number of falls in past 12 months 1   Fall Risk Score -       Health Maintenance reviewed and discussed and ordered per Provider.     Health Maintenance Due   Topic Date Due    Shingrix Vaccine Age 49> (1 of 2) 07/23/1981    Bone Densitometry (Dexa) Screening  07/23/1996    Pneumococcal 65+ years (2 of 2 - PCV13) 09/19/2012    DTaP/Tdap/Td series (1 - Tdap) 08/17/2018    BREAST CANCER SCRN MAMMOGRAM  08/24/2018    Influenza Age 9 to Adult  08/01/2019           Coordination of Care:  1. Have you been to the ER, urgent care clinic since your last visit? Hospitalized since your last visit? no    2. Have you seen or consulted any other health care providers outside of the [address] [place] [address] since your last visit? Include any pap smears or colon screening.  no

## 2019-08-16 ENCOUNTER — TELEPHONE (OUTPATIENT)
Dept: FAMILY MEDICINE CLINIC | Facility: CLINIC | Age: 84
End: 2019-08-16

## 2019-08-16 LAB
ALBUMIN SERPL-MCNC: 3.3 G/DL (ref 3.4–5)
ALBUMIN/GLOB SERPL: 1 {RATIO} (ref 0.8–1.7)
ALP SERPL-CCNC: 76 U/L (ref 45–117)
ALT SERPL-CCNC: 32 U/L (ref 13–56)
ANION GAP SERPL CALC-SCNC: 10 MMOL/L (ref 3–18)
AST SERPL-CCNC: 26 U/L (ref 10–38)
BILIRUB SERPL-MCNC: 1.4 MG/DL (ref 0.2–1)
BUN SERPL-MCNC: 15 MG/DL (ref 7–18)
BUN/CREAT SERPL: 14 (ref 12–20)
CALCIUM SERPL-MCNC: 8.4 MG/DL (ref 8.5–10.1)
CHLORIDE SERPL-SCNC: 96 MMOL/L (ref 100–111)
CO2 SERPL-SCNC: 26 MMOL/L (ref 21–32)
CREAT SERPL-MCNC: 1.05 MG/DL (ref 0.6–1.3)
GLOBULIN SER CALC-MCNC: 3.3 G/DL (ref 2–4)
GLUCOSE SERPL-MCNC: 100 MG/DL (ref 74–99)
POTASSIUM SERPL-SCNC: 2.8 MMOL/L (ref 3.5–5.5)
PROT SERPL-MCNC: 6.6 G/DL (ref 6.4–8.2)
SODIUM SERPL-SCNC: 132 MMOL/L (ref 136–145)

## 2019-08-16 RX ORDER — POTASSIUM CHLORIDE 750 MG/1
TABLET, EXTENDED RELEASE ORAL
Qty: 60 TAB | Refills: 3 | Status: SHIPPED | OUTPATIENT
Start: 2019-08-16

## 2019-08-16 NOTE — TELEPHONE ENCOUNTER
As I was writing the note Manjula Bains was calling in the potasium critical results of the potassium at 2.8. I stated that this was just taken care of by Dr. Giovany Akins.

## 2019-08-16 NOTE — TELEPHONE ENCOUNTER
Per Dr. Bianca Glover call patient and let her know that her Potassium is low  And he will be calling in a potassium pill that she needs to take. Rx called into the [facility]. Patient was called and spoke to the daughter Naif Corona and stated that mothers potassium was low and needed to take a medication and Dr. Bianca Glover sent it to her [facility]. Daughter understood and will  the medication today.

## 2019-08-17 NOTE — PROGRESS NOTES
The patient presents to the office today with the chief complaint of fever    HPI    The patient complains of 24 hrs of \"not feeling well. \"  She feels chilled. She has mild chest congestion but she denies a cough. . The patient is known to have interstitial lung disease. This has been stable as of late with only mild dyspnea. She notes feeling cold. The patient remains on therapy for multiple myeloma. She has been tolerating the therapy well. The patient remains on HCTZ for hypertension. She has been doing well on the medication. Review of Systems   Constitutional: Positive for fever. Respiratory: Positive for shortness of breath (mild dyspnea). Cardiovascular: Negative for chest pain and leg swelling. Allergies   Allergen Reactions    Penicillin V Potassium Diarrhea     States not allergic    Shellfish Derived Swelling       Current Outpatient Medications   Medication Sig Dispense Refill    potassium chloride (KLOR-CON) 10 mEq tablet 1 tab twice per day 60 Tab 3    cefUROXime (CEFTIN) 500 mg tablet Take 1 Tab by mouth two (2) times a day. 20 Tab 0    varicella-zoster recombinant, PF, (SHINGRIX, PF,) 50 mcg/0.5 mL susr injection 0.5 ml IM for the first dose. Repeat in 3 months 0.5 mL 1    clotrimazole-betamethasone (LOTRISONE) topical cream APPLY TO AFFECTED AREA TWICE PER DAY 45 g 2    hydroCHLOROthiazide (MICROZIDE) 12.5 mg capsule TAKE 1 CAPSULE BY MOUTH ONCE DAILY IN THE MORNING 90 Cap 0    HYDROcodone-acetaminophen (NORCO) 5-325 mg per tablet Take 1 Tab by mouth every four (4) hours as needed for Pain.  REVLIMID 25 mg cap       aspirin delayed-release 81 mg tablet 81 mg.      acetaminophen (TYLENOL) 325 mg tablet Take  by mouth every four (4) hours as needed for Pain.  sertraline (ZOLOFT) 50 mg tablet TAKE ONE TABLET BY MOUTH ONCE DAILY 30 Tab 0    cyanocobalamin 1,000 mcg tablet Take 1,000 mcg by mouth daily.       diazepam (VALIUM) 5 mg tablet Take 5 mg by mouth every six (6) hours as needed for Anxiety.          Past Medical History:   Diagnosis Date    Acute bronchitis     Acute upper respiratory infections of unspecified site     Cough     Essential hypertension, benign     Meniere's disease, unspecified     Muscle pain     Pain in limb        Past Surgical History:   Procedure Laterality Date    HX HYSTERECTOMY      HX MOHS PROCEDURES Left        Social History     Socioeconomic History    Marital status:      Spouse name: Not on file    Number of children: Not on file    Years of education: Not on file    Highest education level: Not on file   Occupational History    Not on file   Social Needs    Financial resource strain: Not on file    Food insecurity:     Worry: Not on file     Inability: Not on file    Transportation needs:     Medical: Not on file     Non-medical: Not on file   Tobacco Use    Smoking status: Never Smoker    Smokeless tobacco: Never Used   Substance and Sexual Activity    Alcohol use: No    Drug use: No    Sexual activity: Not Currently   Lifestyle    Physical activity:     Days per week: Not on file     Minutes per session: Not on file    Stress: Not on file   Relationships    Social connections:     Talks on phone: Not on file     Gets together: Not on file     Attends Mandaen service: Not on file     Active member of club or organization: Not on file     Attends meetings of clubs or organizations: Not on file     Relationship status: Not on file    Intimate partner violence:     Fear of current or ex partner: Not on file     Emotionally abused: Not on file     Physically abused: Not on file     Forced sexual activity: Not on file   Other Topics Concern    Not on file   Social History Narrative    Not on file       Patient does have an advanced directive on file    Visit Vitals  /42 (BP 1 Location: Right arm, BP Patient Position: Sitting)   Pulse 82   Temp (!) 101.7 °F (38.7 °C) (Oral)   Resp 12 Ht 5' 5\" (1.651 m)   Wt 140 lb (63.5 kg)   SpO2 92%   BMI 23.30 kg/m²       Physical Exam   Neck: Carotid bruit is not present. Cardiovascular: Normal rate and regular rhythm. Exam reveals no gallop. No murmur heard. Pulmonary/Chest: She has no wheezes. She has rales (dry coarse overt the lower 1/3 of dorsum bilaterally). Abdominal: Soft. She exhibits no distension. There is no tenderness. Musculoskeletal: She exhibits no edema. Lymphadenopathy:        Right cervical: No superficial cervical, no deep cervical and no posterior cervical adenopathy present. Left cervical: No superficial cervical, no deep cervical and no posterior cervical adenopathy present. Hospital Outpatient Visit on 08/15/2019   Component Date Value Ref Range Status    WBC 08/15/2019 4.0* 4.6 - 13.2 K/uL Final    RBC 08/15/2019 3.42* 4.20 - 5.30 M/uL Final    HGB 08/15/2019 10.1* 12.0 - 16.0 g/dL Final    HCT 08/15/2019 31.3* 35.0 - 45.0 % Final    MCV 08/15/2019 91.5  74.0 - 97.0 FL Final    MCH 08/15/2019 29.5  24.0 - 34.0 PG Final    MCHC 08/15/2019 32.3  31.0 - 37.0 g/dL Final    RDW 08/15/2019 17.4* 11.6 - 14.5 % Final    PLATELET 36/10/3453 81* 135 - 420 K/uL Final    NEUTROPHILS 08/15/2019 65  40 - 73 % Final    LYMPHOCYTES 08/15/2019 18* 21 - 52 % Final    MONOCYTES 08/15/2019 16* 3 - 10 % Final    EOSINOPHILS 08/15/2019 1  0 - 5 % Final    BASOPHILS 08/15/2019 0  0 - 2 % Final    ABS. NEUTROPHILS 08/15/2019 2.6  1.8 - 8.0 K/UL Final    ABS. LYMPHOCYTES 08/15/2019 0.7* 0.9 - 3.6 K/UL Final    ABS. MONOCYTES 08/15/2019 0.6  0.05 - 1.2 K/UL Final    ABS. EOSINOPHILS 08/15/2019 0.1  0.0 - 0.4 K/UL Final    ABS. BASOPHILS 08/15/2019 0.0  0.0 - 0.1 K/UL Final    DF 08/15/2019 AUTOMATED    Final    Sodium 08/15/2019 132* 136 - 145 mmol/L Final    Potassium 08/15/2019 2.8* 3.5 - 5.5 mmol/L Final    Comment: Unsuccessful attempt to notify of critical values.   Referred to client services for call to physician in a.m. CALLED TO AND CORRECTLY REPEATED BY:  [facility] NURSE [phone] ON [id number] TO ERIC      Chloride 08/15/2019 96* 100 - 111 mmol/L Final    PLEASE NOTE NEW REFERENCE RANGE    CO2 08/15/2019 26  21 - 32 mmol/L Final    Anion gap 08/15/2019 10  3.0 - 18 mmol/L Final    Glucose 08/15/2019 100* 74 - 99 mg/dL Final    BUN 08/15/2019 15  7.0 - 18 MG/DL Final    Creatinine 08/15/2019 1.05  0.6 - 1.3 MG/DL Final    BUN/Creatinine ratio 08/15/2019 14  12 - 20   Final    GFR est AA 08/15/2019 60* >60 ml/min/1.73m2 Final    GFR est non-AA 08/15/2019 49* >60 ml/min/1.73m2 Final    Comment: (NOTE)  Estimated GFR is calculated using the Modification of Diet in Renal   Disease (MDRD) Study equation, reported for both  Americans   (GFRAA) and non- Americans (GFRNA), and normalized to 1.73m2   body surface area. The physician must decide which value applies to   the patient. The MDRD study equation should only be used in   individuals age 25 or older. It has not been validated for the   following: pregnant women, patients with serious comorbid conditions,   or on certain medications, or persons with extremes of body size,   muscle mass, or nutritional status.  Calcium 08/15/2019 8.4* 8.5 - 10.1 MG/DL Final    Bilirubin, total 08/15/2019 1.4* 0.2 - 1.0 MG/DL Final    ALT (SGPT) 08/15/2019 32  13 - 56 U/L Final    AST (SGOT) 08/15/2019 26  10 - 38 U/L Final    PLEASE NOTE NEW REFERENCE RANGE    Alk. phosphatase 08/15/2019 76  45 - 117 U/L Final    Protein, total 08/15/2019 6.6  6.4 - 8.2 g/dL Final    Albumin 08/15/2019 3.3* 3.4 - 5.0 g/dL Final    Globulin 08/15/2019 3.3  2.0 - 4.0 g/dL Final    A-G Ratio 08/15/2019 1.0  0.8 - 1.7   Final   Office Visit on 05/21/2019   Component Date Value Ref Range Status    White Blood Cells 05/21/2019 Many   Final    Epithelial Cells 05/21/2019 Moderate   Final    Result 1 05/21/2019 Many gram negative diplococci.    Final    Result 2 05/21/2019 Few gram negative rods. Final    Result 3 05/21/2019 Few gram positive cocci   Final    Result 4 05/21/2019 Moderate amount of yeast seen. Final    Gram Stain Evaluation 05/21/2019    Final                    Value: This specimen is of good quality and is acceptable for routine  bacterial culture.  Lower Respiratory Culture 05/21/2019    Final                    Value:Routine respiratory kassie  Heavy growth         . Results for orders placed or performed during the hospital encounter of 08/15/19   CBC WITH AUTOMATED DIFF   Result Value Ref Range    WBC 4.0 (L) 4.6 - 13.2 K/uL    RBC 3.42 (L) 4.20 - 5.30 M/uL    HGB 10.1 (L) 12.0 - 16.0 g/dL    HCT 31.3 (L) 35.0 - 45.0 %    MCV 91.5 74.0 - 97.0 FL    MCH 29.5 24.0 - 34.0 PG    MCHC 32.3 31.0 - 37.0 g/dL    RDW 17.4 (H) 11.6 - 14.5 %    PLATELET 81 (L) 826 - 420 K/uL    NEUTROPHILS 65 40 - 73 %    LYMPHOCYTES 18 (L) 21 - 52 %    MONOCYTES 16 (H) 3 - 10 %    EOSINOPHILS 1 0 - 5 %    BASOPHILS 0 0 - 2 %    ABS. NEUTROPHILS 2.6 1.8 - 8.0 K/UL    ABS. LYMPHOCYTES 0.7 (L) 0.9 - 3.6 K/UL    ABS. MONOCYTES 0.6 0.05 - 1.2 K/UL    ABS. EOSINOPHILS 0.1 0.0 - 0.4 K/UL    ABS. BASOPHILS 0.0 0.0 - 0.1 K/UL    DF AUTOMATED     METABOLIC PANEL, COMPREHENSIVE   Result Value Ref Range    Sodium 132 (L) 136 - 145 mmol/L    Potassium 2.8 (LL) 3.5 - 5.5 mmol/L    Chloride 96 (L) 100 - 111 mmol/L    CO2 26 21 - 32 mmol/L    Anion gap 10 3.0 - 18 mmol/L    Glucose 100 (H) 74 - 99 mg/dL    BUN 15 7.0 - 18 MG/DL    Creatinine 1.05 0.6 - 1.3 MG/DL    BUN/Creatinine ratio 14 12 - 20      GFR est AA 60 (L) >60 ml/min/1.73m2    GFR est non-AA 49 (L) >60 ml/min/1.73m2    Calcium 8.4 (L) 8.5 - 10.1 MG/DL    Bilirubin, total 1.4 (H) 0.2 - 1.0 MG/DL    ALT (SGPT) 32 13 - 56 U/L    AST (SGOT) 26 10 - 38 U/L    Alk.  phosphatase 76 45 - 117 U/L    Protein, total 6.6 6.4 - 8.2 g/dL    Albumin 3.3 (L) 3.4 - 5.0 g/dL    Globulin 3.3 2.0 - 4.0 g/dL    A-G Ratio 1.0 0.8 - 1.7 Assessment / Plan      ICD-10-CM ICD-9-CM    1. FUO (fever of unknown origin) R50.9 780.60 CBC WITH AUTOMATED DIFF      METABOLIC PANEL, COMPREHENSIVE   2. Essential hypertension, benign I10 401.1    3. Multiple myeloma not having achieved remission (HCC) C90.00 203.00    4. ILD (interstitial lung disease) (HCC) J84.9 515        Fever of unknown origin - most likely pulmonary in source  Ceftin  she was advised to continue her maintenance medications  To ER if worse    Follow-up and Dispositions    · Return in about 4 days (around 8/19/2019). I asked Yanick Alvarenga. Amanda Diaz if she has any questions and I answered the questions. Dania Diaz states that she understands the treatment plan and agrees with the treatment plan          THIS DOCUMENT WAS CREATED WITH A VOICE ACTIVATED DICTATION SYSTEM.   IT MAY CONTAIN TRANSCRIPTION ERRORS

## 2019-08-23 ENCOUNTER — TELEPHONE (OUTPATIENT)
Dept: FAMILY MEDICINE CLINIC | Facility: CLINIC | Age: 84
End: 2019-08-23

## 2019-08-23 NOTE — TELEPHONE ENCOUNTER
Patient called stating she has finished all the medication that was prescribe and is still a slight fever, and diarrhea from the medication. Please call her at [phone].

## 2019-08-29 ENCOUNTER — OFFICE VISIT (OUTPATIENT)
Dept: FAMILY MEDICINE CLINIC | Facility: CLINIC | Age: 84
End: 2019-08-29

## 2019-08-29 VITALS
WEIGHT: 138 LBS | DIASTOLIC BLOOD PRESSURE: 64 MMHG | TEMPERATURE: 99.8 F | SYSTOLIC BLOOD PRESSURE: 154 MMHG | OXYGEN SATURATION: 97 % | BODY MASS INDEX: 22.99 KG/M2 | RESPIRATION RATE: 12 BRPM | HEIGHT: 65 IN | HEART RATE: 76 BPM

## 2019-08-29 DIAGNOSIS — I10 ESSENTIAL HYPERTENSION, BENIGN: ICD-10-CM

## 2019-08-29 DIAGNOSIS — J47.9 BRONCHIECTASIS WITHOUT ACUTE EXACERBATION (HCC): Primary | ICD-10-CM

## 2019-08-29 DIAGNOSIS — C90.00 MULTIPLE MYELOMA NOT HAVING ACHIEVED REMISSION (HCC): ICD-10-CM

## 2019-08-29 RX ORDER — CLINDAMYCIN HYDROCHLORIDE 300 MG/1
300 CAPSULE ORAL 3 TIMES DAILY
Qty: 30 CAP | Refills: 0 | Status: SHIPPED | OUTPATIENT
Start: 2019-08-29 | End: 2019-09-08

## 2019-08-29 RX ORDER — PREDNISONE 20 MG/1
TABLET ORAL
Qty: 20 TAB | Refills: 0 | Status: SHIPPED | OUTPATIENT
Start: 2019-08-29 | End: 2019-09-24 | Stop reason: ALTCHOICE

## 2019-08-29 RX ORDER — PANTOPRAZOLE SODIUM 40 MG/1
40 TABLET, DELAYED RELEASE ORAL DAILY
Qty: 30 TAB | Refills: 1 | Status: SHIPPED | OUTPATIENT
Start: 2019-08-29 | End: 2019-10-07 | Stop reason: ALTCHOICE

## 2019-08-29 RX ORDER — FLUCONAZOLE 100 MG/1
TABLET ORAL
Qty: 7 TAB | Refills: 0 | Status: SHIPPED | OUTPATIENT
Start: 2019-08-29 | End: 2019-09-24 | Stop reason: ALTCHOICE

## 2019-08-29 NOTE — PROGRESS NOTES
Inna Steven presents today for   Chief Complaint   Patient presents with    Cold Symptoms     over 1 week ago       Is someone accompanying this pt? no    Is the patient using any DME equipment during [address]? no    Depression Screening:  3 most recent PHQ Screens 10/20/2016   Little interest or pleasure in doing things More than half the days   Feeling down, depressed, irritable, or hopeless More than half the days   Total Score PHQ 2 4   Trouble falling or staying asleep, or sleeping too much Nearly every day   Feeling tired or having little energy Nearly every day   Poor appetite, weight loss, or overeating Not at all   Feeling bad about yourself - or that you are a failure or have let yourself or your family down Not at all   Trouble concentrating on things such as school, work, reading, or watching TV Not at all   Moving or speaking so slowly that other people could have noticed; or the opposite being so fidgety that others notice Several days   Thoughts of being better off dead, or hurting yourself in some way Not at all   PHQ 9 Score 11   How difficult have these problems made it for you to do your work, take care of your home and get along with others Not difficult at all       Learning Assessment:  Learning Assessment 1/8/2016   PRIMARY LEARNER Patient   HIGHEST LEVEL OF EDUCATION - PRIMARY LEARNER  GRADUATED HIGH SCHOOL OR GED   BARRIERS PRIMARY LEARNER NONE   CO-LEARNER CAREGIVER No   PRIMARY LANGUAGE ENGLISH    NEED No   LEARNER PREFERENCE PRIMARY VIDEOS     DEMONSTRATION     READING     PICTURES   ANSWERED BY patient   RELATIONSHIP SELF       Abuse Screening:  No flowsheet data found. Fall Risk  Fall Risk Assessment, last 12 mths 8/29/2019   Able to walk? Yes   Fall in past 12 months? Yes   Fall with injury? -   Number of falls in past 12 months 6   Fall Risk Score -       Health Maintenance reviewed and discussed and ordered per Provider.     Health Maintenance Due   Topic Date Due    Shingrix Vaccine Age 50> (1 of 2) 07/23/1981    Bone Densitometry (Dexa) Screening  07/23/1996    Pneumococcal 65+ years (2 of 2 - PCV13) 09/19/2012    DTaP/Tdap/Td series (1 - Tdap) 08/17/2018    BREAST CANCER SCRN MAMMOGRAM  08/24/2018    Influenza Age 9 to Adult  08/01/2019           Coordination of Care:  1. Have you been to the ER, urgent care clinic since your last visit? Hospitalized since your last visit? no    2. Have you seen or consulted any other health care providers outside of the [address] [place] [address] since your last visit? Include any pap smears or colon screening.  no

## 2019-09-03 NOTE — PROGRESS NOTES
The patient presents to the office today with the chief complaint of cough    HPI    The patient is known to have bronchiectasis. Today however that the chest congestion is greatly worsened and her cough is worsened. The patient feels short of breath. The patient does not note any fever. The patient has multiple myeloma. The patient is followed by hematology. The patient has been on chemotherapy. The chemotherapy at times need to be held secondary to the patient's respiratory infections. The patient remains on hydrochlorothiazide for hypertension. The patient is doing well on his medication. ROS  Positive for chest congestion, cough, and shortness of breath  Negative for fever or chest pain    Allergies   Allergen Reactions    Penicillin V Potassium Diarrhea     States not allergic    Shellfish Derived Swelling       Current Outpatient Medications   Medication Sig Dispense Refill    clindamycin (CLEOCIN) 300 mg capsule Take 1 Cap by mouth three (3) times daily for 10 days. 30 Cap 0    predniSONE (DELTASONE) 20 mg tablet 3 tabs daily x 3 days, 2 tabs daily x 3 days, 1 tab daily x 3 days, 1/2 tab daily x 3 days 20 Tab 0    fluconazole (DIFLUCAN) 100 mg tablet 1 tab daily 7 Tab 0    pantoprazole (PROTONIX) 40 mg tablet Take 1 Tab by mouth daily. 30 Tab 1    potassium chloride (KLOR-CON) 10 mEq tablet 1 tab twice per day 60 Tab 3    varicella-zoster recombinant, PF, (SHINGRIX, PF,) 50 mcg/0.5 mL susr injection 0.5 ml IM for the first dose. Repeat in 3 months 0.5 mL 1    clotrimazole-betamethasone (LOTRISONE) topical cream APPLY TO AFFECTED AREA TWICE PER DAY 45 g 2    hydroCHLOROthiazide (MICROZIDE) 12.5 mg capsule TAKE 1 CAPSULE BY MOUTH ONCE DAILY IN THE MORNING 90 Cap 0    HYDROcodone-acetaminophen (NORCO) 5-325 mg per tablet Take 1 Tab by mouth every four (4) hours as needed for Pain.       REVLIMID 25 mg cap       aspirin delayed-release 81 mg tablet 81 mg.      acetaminophen (TYLENOL) 325 mg tablet Take  by mouth every four (4) hours as needed for Pain.  sertraline (ZOLOFT) 50 mg tablet TAKE ONE TABLET BY MOUTH ONCE DAILY 30 Tab 0    cyanocobalamin 1,000 mcg tablet Take 1,000 mcg by mouth daily.  diazepam (VALIUM) 5 mg tablet Take 5 mg by mouth every six (6) hours as needed for Anxiety.          Past Medical History:   Diagnosis Date    Acute bronchitis     Acute upper respiratory infections of unspecified site     Cough     Essential hypertension, benign     Meniere's disease, unspecified     Muscle pain     Pain in limb        Past Surgical History:   Procedure Laterality Date    HX HYSTERECTOMY      HX MOHS PROCEDURES Left        Social History     Socioeconomic History    Marital status:      Spouse name: Not on file    Number of children: Not on file    Years of education: Not on file    Highest education level: Not on file   Occupational History    Not on file   Social Needs    Financial resource strain: Not on file    Food insecurity:     Worry: Not on file     Inability: Not on file    Transportation needs:     Medical: Not on file     Non-medical: Not on file   Tobacco Use    Smoking status: Never Smoker    Smokeless tobacco: Never Used   Substance and Sexual Activity    Alcohol use: No    Drug use: No    Sexual activity: Not Currently   Lifestyle    Physical activity:     Days per week: Not on file     Minutes per session: Not on file    Stress: Not on file   Relationships    Social connections:     Talks on phone: Not on file     Gets together: Not on file     Attends Advent service: Not on file     Active member of club or organization: Not on file     Attends meetings of clubs or organizations: Not on file     Relationship status: Not on file    Intimate partner violence:     Fear of current or ex partner: Not on file     Emotionally abused: Not on file     Physically abused: Not on file     Forced sexual activity: Not on file   Other Topics Concern    Not on file   Social History Narrative    Not on file       Patient does have an advanced directive on file    Visit Vitals  /64 (BP 1 Location: Right arm, BP Patient Position: Sitting)   Pulse 76   Temp 99.8 °F (37.7 °C) (Oral)   Resp 12   Ht 5' 5\" (1.651 m)   Wt 138 lb (62.6 kg)   SpO2 97%   BMI 22.96 kg/m²       Physical Exam  Whitney exam: Normal to percussion. Coarse rhonchi are present throughout all lung fields. Cardiac exam: S1 and S2 are normal, no gallops, no murmurs  No carotid bruits  Abdomen is soft and nontender  No lower extremity edema    BMI: [facility] Outpatient Visit on 08/15/2019   Component Date Value Ref Range Status    WBC 08/15/2019 4.0* 4.6 - 13.2 K/uL Final    RBC 08/15/2019 3.42* 4.20 - 5.30 M/uL Final    HGB 08/15/2019 10.1* 12.0 - 16.0 g/dL Final    HCT 08/15/2019 31.3* 35.0 - 45.0 % Final    MCV 08/15/2019 91.5  74.0 - 97.0 FL Final    MCH 08/15/2019 29.5  24.0 - 34.0 PG Final    MCHC 08/15/2019 32.3  31.0 - 37.0 g/dL Final    RDW 08/15/2019 17.4* 11.6 - 14.5 % Final    PLATELET 40/08/1689 81* 135 - 420 K/uL Final    NEUTROPHILS 08/15/2019 65  40 - 73 % Final    LYMPHOCYTES 08/15/2019 18* 21 - 52 % Final    MONOCYTES 08/15/2019 16* 3 - 10 % Final    EOSINOPHILS 08/15/2019 1  0 - 5 % Final    BASOPHILS 08/15/2019 0  0 - 2 % Final    ABS. NEUTROPHILS 08/15/2019 2.6  1.8 - 8.0 K/UL Final    ABS. LYMPHOCYTES 08/15/2019 0.7* 0.9 - 3.6 K/UL Final    ABS. MONOCYTES 08/15/2019 0.6  0.05 - 1.2 K/UL Final    ABS. EOSINOPHILS 08/15/2019 0.1  0.0 - 0.4 K/UL Final    ABS. BASOPHILS 08/15/2019 0.0  0.0 - 0.1 K/UL Final    DF 08/15/2019 AUTOMATED    Final    Sodium 08/15/2019 132* 136 - 145 mmol/L Final    Potassium 08/15/2019 2.8* 3.5 - 5.5 mmol/L Final    Comment: Unsuccessful attempt to notify of critical values. Referred to client services for call to physician in a.m.   CALLED TO AND CORRECTLY REPEATED BY:  Giselle Sam NURSE [phone] ON [phone] TO [facility]      Chloride 08/15/2019 96* 100 - 111 mmol/L Final    PLEASE NOTE NEW REFERENCE RANGE    CO2 08/15/2019 26  21 - 32 mmol/L Final    Anion gap 08/15/2019 10  3.0 - 18 mmol/L Final    Glucose 08/15/2019 100* 74 - 99 mg/dL Final    BUN 08/15/2019 15  7.0 - 18 MG/DL Final    Creatinine 08/15/2019 1.05  0.6 - 1.3 MG/DL Final    BUN/Creatinine ratio 08/15/2019 14  12 - 20   Final    GFR est AA 08/15/2019 60* >60 ml/min/1.73m2 Final    GFR est non-AA 08/15/2019 49* >60 ml/min/1.73m2 Final    Comment: (NOTE)  Estimated GFR is calculated using the Modification of Diet in Renal   Disease (MDRD) Study equation, reported for both  Americans   (GFRAA) and non- Americans (GFRNA), and normalized to 1.73m2   body surface area. The physician must decide which value applies to   the patient. The MDRD study equation should only be used in   individuals age 25 or older. It has not been validated for the   following: pregnant women, patients with serious comorbid conditions,   or on certain medications, or persons with extremes of body size,   muscle mass, or nutritional status.  Calcium 08/15/2019 8.4* 8.5 - 10.1 MG/DL Final    Bilirubin, total 08/15/2019 1.4* 0.2 - 1.0 MG/DL Final    ALT (SGPT) 08/15/2019 32  13 - 56 U/L Final    AST (SGOT) 08/15/2019 26  10 - 38 U/L Final    PLEASE NOTE NEW REFERENCE RANGE    Alk. phosphatase 08/15/2019 76  45 - 117 U/L Final    Protein, total 08/15/2019 6.6  6.4 - 8.2 g/dL Final    Albumin 08/15/2019 3.3* 3.4 - 5.0 g/dL Final    Globulin 08/15/2019 3.3  2.0 - 4.0 g/dL Final    A-G Ratio 08/15/2019 1.0  0.8 - 1.7   Final       .No results found for any visits on 08/29/19. Assessment / Plan      ICD-10-CM ICD-9-CM    1. Bronchiectasis without acute exacerbation (HCC) J47.9 494.0    2. Essential hypertension, benign I10 401.1    3.  Multiple myeloma not having achieved remission ([facility]) C90.00 203.00        Bronchiectasis in relapse Clindamycin        Prednisone  she was advised to continue her maintenance medications  The patient will call if she is not better in 5 days. Patient is vascular emergency room if worse             Follow-up and Dispositions    · Return in about 5 weeks (around 10/3/2019). I asked Yanick Alvarenga. Amanda Diaz if she has any questions and I answered the questions. Dania Diaz states that she understands the treatment plan and agrees with the treatment plan          THIS DOCUMENT WAS CREATED WITH A VOICE ACTIVATED DICTATION SYSTEM.   IT MAY CONTAIN TRANSCRIPTION ERRORS

## 2019-09-04 ENCOUNTER — OFFICE VISIT (OUTPATIENT)
Dept: FAMILY MEDICINE CLINIC | Facility: CLINIC | Age: 84
End: 2019-09-04

## 2019-09-04 VITALS
SYSTOLIC BLOOD PRESSURE: 130 MMHG | DIASTOLIC BLOOD PRESSURE: 54 MMHG | RESPIRATION RATE: 14 BRPM | OXYGEN SATURATION: 97 % | HEART RATE: 84 BPM | HEIGHT: 65 IN | TEMPERATURE: 97.9 F | BODY MASS INDEX: 22.99 KG/M2 | WEIGHT: 138 LBS

## 2019-09-04 DIAGNOSIS — C90.00 MULTIPLE MYELOMA, REMISSION STATUS UNSPECIFIED (HCC): ICD-10-CM

## 2019-09-04 DIAGNOSIS — J47.9 BRONCHIECTASIS WITHOUT ACUTE EXACERBATION (HCC): Primary | ICD-10-CM

## 2019-09-04 NOTE — PROGRESS NOTES
[facility] presents today for   Chief Complaint   Patient presents with    Follow-up     congestion       [facility] preferred language for health care discussion is english. Is someone accompanying this pt? Yes daughter    Is the patient using any DME equipment during [address]? no    Depression Screening:  3 most recent PHQ Screens 10/20/2016   Little interest or pleasure in doing things More than half the days   Feeling down, depressed, irritable, or hopeless More than half the days   Total Score PHQ 2 4   Trouble falling or staying asleep, or sleeping too much Nearly every day   Feeling tired or having little energy Nearly every day   Poor appetite, weight loss, or overeating Not at all   Feeling bad about yourself - or that you are a failure or have let yourself or your family down Not at all   Trouble concentrating on things such as school, work, reading, or watching TV Not at all   Moving or speaking so slowly that other people could have noticed; or the opposite being so fidgety that others notice Several days   Thoughts of being better off dead, or hurting yourself in some way Not at all   PHQ 9 Score 11   How difficult have these problems made it for you to do your work, take care of your home and get along with others Not difficult at all       Learning Assessment:  Learning Assessment 1/8/2016   PRIMARY LEARNER Patient   HIGHEST LEVEL OF EDUCATION - PRIMARY LEARNER  GRADUATED HIGH SCHOOL OR GED   BARRIERS PRIMARY LEARNER NONE   CO-LEARNER CAREGIVER No   PRIMARY LANGUAGE ENGLISH    NEED No   LEARNER PREFERENCE PRIMARY VIDEOS     DEMONSTRATION     READING     PICTURES   ANSWERED BY patient   RELATIONSHIP SELF       Abuse Screening:  Abuse Screening Questionnaire 9/4/2019   Do you ever feel afraid of your partner? N   Are you in a relationship with someone who physically or mentally threatens you? N   Is it safe for you to go home?  Y       Fall Risk  Fall Risk Assessment, last 15 mths 9/4/2019   Able to walk? Yes   Fall in past 12 months? Yes   Fall with injury? -   Number of falls in past 12 months 3   Fall Risk Score -       Health Maintenance reviewed and discussed and ordered per Provider. Health Maintenance Due   Topic Date Due    Shingrix Vaccine Age 49> (1 of 2) 07/23/1981    Bone Densitometry (Dexa) Screening  07/23/1996    Pneumococcal 65+ years (2 of 2 - PCV13) 09/19/2012    DTaP/Tdap/Td series (1 - Tdap) 08/17/2018    BREAST CANCER SCRN MAMMOGRAM  08/24/2018    Influenza Age 9 to Adult  08/01/2019   . Jeanie Cintron is updated on all     Pt currently taking Antiplatelet therapy? Yes aspirin    Coordination of Care:  1. Have you been to the ER, urgent care clinic since your last visit? no Hospitalized since your last visit? no    2. Have you seen or consulted any other health care providers outside of the [address] [place] [address] since your last visit? no Include any pap smears or colon screening.  no

## 2019-09-05 NOTE — PROGRESS NOTES
The patient presents to the office today with the chief complaint of bronchiectasis  s  HPI    The patient has bronchiectasis in relapse. She is on Clindamycin and Prednisone. She had rapidly improved but now she is not feeling as well. She has problems with sleep. She is fatigued. She persists with chest congestion with cough. Review of Systems   Constitutional: Positive for malaise/fatigue. Respiratory: Positive for cough and shortness of breath. Cardiovascular: Negative for chest pain and leg swelling. Allergies   Allergen Reactions    Penicillin V Potassium Diarrhea     States not allergic    Shellfish Derived Swelling       Current Outpatient Medications   Medication Sig Dispense Refill    clindamycin (CLEOCIN) 300 mg capsule Take 1 Cap by mouth three (3) times daily for 10 days. 30 Cap 0    predniSONE (DELTASONE) 20 mg tablet 3 tabs daily x 3 days, 2 tabs daily x 3 days, 1 tab daily x 3 days, 1/2 tab daily x 3 days 20 Tab 0    fluconazole (DIFLUCAN) 100 mg tablet 1 tab daily 7 Tab 0    pantoprazole (PROTONIX) 40 mg tablet Take 1 Tab by mouth daily. 30 Tab 1    potassium chloride (KLOR-CON) 10 mEq tablet 1 tab twice per day 60 Tab 3    clotrimazole-betamethasone (LOTRISONE) topical cream APPLY TO AFFECTED AREA TWICE PER DAY 45 g 2    hydroCHLOROthiazide (MICROZIDE) 12.5 mg capsule TAKE 1 CAPSULE BY MOUTH ONCE DAILY IN THE MORNING 90 Cap 0    HYDROcodone-acetaminophen (NORCO) 5-325 mg per tablet Take 1 Tab by mouth every four (4) hours as needed for Pain.  REVLIMID 25 mg cap       aspirin delayed-release 81 mg tablet 81 mg.      acetaminophen (TYLENOL) 325 mg tablet Take  by mouth every four (4) hours as needed for Pain.  sertraline (ZOLOFT) 50 mg tablet TAKE ONE TABLET BY MOUTH ONCE DAILY 30 Tab 0    cyanocobalamin 1,000 mcg tablet Take 1,000 mcg by mouth daily.  diazepam (VALIUM) 5 mg tablet Take 5 mg by mouth every six (6) hours as needed for Anxiety.  varicella-zoster recombinant, PF, (SHINGRIX, PF,) 50 mcg/0.5 mL susr injection 0.5 ml IM for the first dose.   Repeat in 3 months 0.5 mL 1       Past Medical History:   Diagnosis Date    Acute bronchitis     Acute upper respiratory infections of unspecified site     Cough     Essential hypertension, benign     Meniere's disease, unspecified     Muscle pain     Pain in limb        Past Surgical History:   Procedure Laterality Date    HX HYSTERECTOMY      HX MOHS PROCEDURES Left        Social History     Socioeconomic History    Marital status:      Spouse name: Not on file    Number of children: Not on file    Years of education: Not on file    Highest education level: Not on file   Occupational History    Not on file   Social Needs    Financial resource strain: Not on file    Food insecurity:     Worry: Not on file     Inability: Not on file    Transportation needs:     Medical: Not on file     Non-medical: Not on file   Tobacco Use    Smoking status: Never Smoker    Smokeless tobacco: Never Used   Substance and Sexual Activity    Alcohol use: No    Drug use: No    Sexual activity: Not Currently   Lifestyle    Physical activity:     Days per week: Not on file     Minutes per session: Not on file    Stress: Not on file   Relationships    Social connections:     Talks on phone: Not on file     Gets together: Not on file     Attends Church service: Not on file     Active member of club or organization: Not on file     Attends meetings of clubs or organizations: Not on file     Relationship status: Not on file    Intimate partner violence:     Fear of current or ex partner: Not on file     Emotionally abused: Not on file     Physically abused: Not on file     Forced sexual activity: Not on file   Other Topics Concern    Not on file   Social History Narrative    Not on file       Patient does have an advanced directive on file    Visit Vitals  /54 (BP 1 Location: Left arm, BP Patient Position: Sitting)   Pulse 84   Temp 97.9 °F (36.6 °C) (Tympanic)   Resp 14   Ht 5' 5\" (1.651 m)   Wt 138 lb (62.6 kg)   SpO2 97%   BMI 22.96 kg/m²       Physical Exam   Neck: Carotid bruit is not present. Cardiovascular: Normal rate and regular rhythm. Exam reveals no gallop. No murmur heard. Pulmonary/Chest: She has no wheezes (Prolonged expiratory phase with end expiratoy phase). She has rales (Coarse dry rales). BMI:  [facility] Outpatient Visit on 08/15/2019   Component Date Value Ref Range Status    WBC 08/15/2019 4.0* 4.6 - 13.2 K/uL Final    RBC 08/15/2019 3.42* 4.20 - 5.30 M/uL Final    HGB 08/15/2019 10.1* 12.0 - 16.0 g/dL Final    HCT 08/15/2019 31.3* 35.0 - 45.0 % Final    MCV 08/15/2019 91.5  74.0 - 97.0 FL Final    MCH 08/15/2019 29.5  24.0 - 34.0 PG Final    MCHC 08/15/2019 32.3  31.0 - 37.0 g/dL Final    RDW 08/15/2019 17.4* 11.6 - 14.5 % Final    PLATELET 34/58/1192 81* 135 - 420 K/uL Final    NEUTROPHILS 08/15/2019 65  40 - 73 % Final    LYMPHOCYTES 08/15/2019 18* 21 - 52 % Final    MONOCYTES 08/15/2019 16* 3 - 10 % Final    EOSINOPHILS 08/15/2019 1  0 - 5 % Final    BASOPHILS 08/15/2019 0  0 - 2 % Final    ABS. NEUTROPHILS 08/15/2019 2.6  1.8 - 8.0 K/UL Final    ABS. LYMPHOCYTES 08/15/2019 0.7* 0.9 - 3.6 K/UL Final    ABS. MONOCYTES 08/15/2019 0.6  0.05 - 1.2 K/UL Final    ABS. EOSINOPHILS 08/15/2019 0.1  0.0 - 0.4 K/UL Final    ABS. BASOPHILS 08/15/2019 0.0  0.0 - 0.1 K/UL Final    DF 08/15/2019 AUTOMATED    Final    Sodium 08/15/2019 132* 136 - 145 mmol/L Final    Potassium 08/15/2019 2.8* 3.5 - 5.5 mmol/L Final    Comment: Unsuccessful attempt to notify of critical values. Referred to client services for call to physician in a.m.   CALLED TO AND CORRECTLY REPEATED BY:  [facility] NURSE [phone] ON [id number] TO ERIC      Chloride 08/15/2019 96* 100 - 111 mmol/L Final    PLEASE NOTE NEW REFERENCE RANGE    CO2 08/15/2019 26  21 - 32 mmol/L Final    Anion gap 08/15/2019 10  3.0 - 18 mmol/L Final    Glucose 08/15/2019 100* 74 - 99 mg/dL Final    BUN 08/15/2019 15  7.0 - 18 MG/DL Final    Creatinine 08/15/2019 1.05  0.6 - 1.3 MG/DL Final    BUN/Creatinine ratio 08/15/2019 14  12 - 20   Final    GFR est AA 08/15/2019 60* >60 ml/min/1.73m2 Final    GFR est non-AA 08/15/2019 49* >60 ml/min/1.73m2 Final    Comment: (NOTE)  Estimated GFR is calculated using the Modification of Diet in Renal   Disease (MDRD) Study equation, reported for both  Americans   (GFRAA) and non- Americans (GFRNA), and normalized to 1.73m2   body surface area. The physician must decide which value applies to   the patient. The MDRD study equation should only be used in   individuals age 25 or older. It has not been validated for the   following: pregnant women, patients with serious comorbid conditions,   or on certain medications, or persons with extremes of body size,   muscle mass, or nutritional status.  Calcium 08/15/2019 8.4* 8.5 - 10.1 MG/DL Final    Bilirubin, total 08/15/2019 1.4* 0.2 - 1.0 MG/DL Final    ALT (SGPT) 08/15/2019 32  13 - 56 U/L Final    AST (SGOT) 08/15/2019 26  10 - 38 U/L Final    PLEASE NOTE NEW REFERENCE RANGE    Alk. phosphatase 08/15/2019 76  45 - 117 U/L Final    Protein, total 08/15/2019 6.6  6.4 - 8.2 g/dL Final    Albumin 08/15/2019 3.3* 3.4 - 5.0 g/dL Final    Globulin 08/15/2019 3.3  2.0 - 4.0 g/dL Final    A-G Ratio 08/15/2019 1.0  0.8 - 1.7   Final       .No results found for any visits on 09/04/19. Assessment / Plan      ICD-10-CM ICD-9-CM    1. Bronchiectasis without acute exacerbation (HCC) J47.9 494.0 REFERRAL TO PULMONARY DISEASE   2. Multiple myeloma, remission status unspecified (HCC) C90.00 203.00        she was advised to continue her maintenance medications  To pulmonary     Follow-up and Dispositions    · Return in about 3 months (around 12/4/2019). I asked Billy Smith if she has any questions and I answered the questions. Angelica Conn states that she understands the treatment plan and agrees with the treatment plan          THIS DOCUMENT WAS CREATED WITH A VOICE ACTIVATED DICTATION SYSTEM.   IT MAY CONTAIN TRANSCRIPTION ERRORS

## 2019-09-24 ENCOUNTER — OFFICE VISIT (OUTPATIENT)
Dept: PULMONOLOGY | Age: 84
End: 2019-09-24

## 2019-09-24 VITALS
HEART RATE: 93 BPM | OXYGEN SATURATION: 94 % | RESPIRATION RATE: 18 BRPM | BODY MASS INDEX: 23.79 KG/M2 | TEMPERATURE: 97.3 F | SYSTOLIC BLOOD PRESSURE: 127 MMHG | DIASTOLIC BLOOD PRESSURE: 53 MMHG | HEIGHT: 65 IN | WEIGHT: 142.8 LBS

## 2019-09-24 DIAGNOSIS — C90.01 MULTIPLE MYELOMA IN REMISSION (HCC): ICD-10-CM

## 2019-09-24 DIAGNOSIS — J84.9 ILD (INTERSTITIAL LUNG DISEASE) (HCC): Primary | ICD-10-CM

## 2019-09-24 DIAGNOSIS — R05.9 COUGH: ICD-10-CM

## 2019-09-24 DIAGNOSIS — R06.02 SOB (SHORTNESS OF BREATH): ICD-10-CM

## 2019-09-24 DIAGNOSIS — I10 ESSENTIAL HYPERTENSION, BENIGN: ICD-10-CM

## 2019-09-24 NOTE — PROGRESS NOTES
Six Minute Walk Test (6MWT) recording form    Anurag Guillory       [id number]                                    7/23/1931 female  [id number]    [x]  Medical history checked  []  Medical clearance provided for the patient to participate in exercise testing      Contraindications to 6MWT:  [] Resting heart rate > 120 beats / min after 10 minutes rest (relative contraindication)  [] Systolic blood pressure > 180 mm Hg +/- diastolic blood pressure > 100 mm Hg (relative contraindication)  [] Resting SpO2 < 85% on room air or on prescribed level of supplemental oxygen  [] Physical disability preventing safe performance  [] No contraindications identified             6MWT        9/24/2019  9:33 AM       Supplemental Oxygen              None   Mobility Aid      None        Time Mins BP SP02 HR RPE Distance Walked Rests/Comments   Rest 127/53 94 93 18       1  92 98  34 meters   0/10   2  94 104  34 meters 0/10     3  94 104  34 meters   0/10   4  94 108  34 meters   0/10   5  94 113  34 meters   0/10   6  94 115  34 meters   0/10   Recovery 1 158/60 95 112 20  0/10   Recovery 2 146/58 95 112 20  0/10     Total distance:                  204 meters              Symptom recovery:               2 min             HR recovery:                     2 min            Limiting factor:                                            Was test terminated: [x] No   [] Yes  If yes, when? 6MWT Termination Criteria:  [] Chest pain or angina-like symptons  [] Heart rate > Predicted HR max.   [] Evolving mental confusioin, light-headedness or incoordination  [] Physical or verbal severe fatigue [] Intolerable dyspnea, unrelieved by rest  [] Persistent SP02 < 85% (Note pending clinical presentation)  [] Abnormal gait pattern (leg cramps, staggering, ataxia)  [] Other clinically warranted reason     INTERPRETATION:      Comparision 6 min walk distance:      RECOMMENDATION:      Mary Velarde LPN

## 2019-09-24 NOTE — PROGRESS NOTES
Ayo Guerra presents today for   Chief Complaint   Patient presents with    Bronchiectasis    Cough    Shortness of Breath       Is someone accompanying this pt? Daughter Tc Siu    Is the patient using any DME equipment during OV? No     -DME Company n/a     Depression Screening:  3 most recent PHQ Screens 10/20/2016   Little interest or pleasure in doing things More than half the days   Feeling down, depressed, irritable, or hopeless More than half the days   Total Score PHQ 2 4   Trouble falling or staying asleep, or sleeping too much Nearly every day   Feeling tired or having little energy Nearly every day   Poor appetite, weight loss, or overeating Not at all   Feeling bad about yourself - or that you are a failure or have let yourself or your family down Not at all   Trouble concentrating on things such as school, work, reading, or watching TV Not at all   Moving or speaking so slowly that other people could have noticed; or the opposite being so fidgety that others notice Several days   Thoughts of being better off dead, or hurting yourself in some way Not at all   PHQ 9 Score 11   How difficult have these problems made it for you to do your work, take care of your home and get along with others Not difficult at all       Learning Assessment:  Learning Assessment 1/8/2016   PRIMARY LEARNER Patient   HIGHEST LEVEL OF EDUCATION - PRIMARY LEARNER  GRADUATED HIGH SCHOOL OR GED   BARRIERS PRIMARY LEARNER NONE   CO-LEARNER CAREGIVER No   PRIMARY LANGUAGE ENGLISH    NEED No   LEARNER PREFERENCE PRIMARY VIDEOS     DEMONSTRATION     READING     PICTURES   ANSWERED BY patient   RELATIONSHIP SELF       Abuse Screening:  Abuse Screening Questionnaire 9/4/2019   Do you ever feel afraid of your partner? N   Are you in a relationship with someone who physically or mentally threatens you? N   Is it safe for you to go home? Y       Fall Risk  Fall Risk Assessment, last 12 mths 9/24/2019   Able to walk? Yes   Fall in past 12 months? Yes   Fall with injury? Yes   Number of falls in past 12 months 4   Fall Risk Score 5         Coordination of Care:  1. Have you been to the ER, urgent care clinic since your last visit? Hospitalized since your last visit? No    2. Have you seen or consulted any other health care providers outside of the [address] [place] [address] since your last visit? Include any pap smears or colon screening.  [facility] Link

## 2019-09-24 NOTE — PROGRESS NOTES
[facility]  Pulmonary, Critical Care, and Sleep Medicine      Pulmonary Office visit    Name: Houston Núñez     : 1931     Date: 2019        Subjective:     Patient is a 80 y.o. female is referred for evaluation of cough. 19   Since her last visit she has been diagnosed with multiple myeloma and currently undergoing treatment with Revlimid. States that she is tolerating her therapy well. In late spring she started with coughing again which seem to persist despite prednisone taper and antibiotics and was more recently seen by Roseanne Ha nurse practitioner in our clinic and was prescribed Spiriva Respimat which she has been using daily with significant improvement in overall symptoms of cough. Mentions some increase in mucus with the change of season  She denies any hemoptysis. Denies any fever or chills  In the past she was diagnosed with interstitial lung disease with symptoms of chronic persistent cough which had improved with treatment and speech therapy recommendations. Has had some episodes of cough- following with speech therapy for safe swallowing therapy. Modified barium swallow recommended. Denies fever, chills. Has had some weight loss, minimal weakness but no significant effect on activities of daily living  Has not had her influenza vaccination for this season-wishes to take it at the infusion center when she goes for her next treatment. HPI:  She describes chronic cough all year around with worsening in winter. Cough starts with bronchitis and persists cough productive of milky- yellow mucus and sometimes difficulty expectoration. C/o stuffy nose and post nasal drip.denies headache. Uses nasal sprays- Flonase. Takes allegra for ears congestion. Not  On any inhalers. Denies wheezing. Denies any exposure to allergens per se but daughter has indoor dog. Denies Pneumonia. Occasional acid reflux.   C/o arthritic joint pain  Denies pedal edema, PND, orthopnea  Non smoker and no industrial exposure. Spouse worked at [employer] for 30 + years- Planner and  for most of his career but had asbestos exposure in early years. Past Medical History:   Diagnosis Date    Acute bronchitis     Acute upper respiratory infections of unspecified site     Cough     Essential hypertension, benign     Meniere's disease, unspecified     Muscle pain     Pain in limb        Past Surgical History:   Procedure Laterality Date    HX HYSTERECTOMY      HX MOHS PROCEDURES Left      Allergies   Allergen Reactions    Penicillin V Potassium Diarrhea     States not allergic    Shellfish Derived Swelling     Current Outpatient Medications   Medication Sig Dispense Refill    predniSONE (DELTASONE) 20 mg tablet 3 tabs daily x 3 days, 2 tabs daily x 3 days, 1 tab daily x 3 days, 1/2 tab daily x 3 days 20 Tab 0    fluconazole (DIFLUCAN) 100 mg tablet 1 tab daily 7 Tab 0    pantoprazole (PROTONIX) 40 mg tablet Take 1 Tab by mouth daily. 30 Tab 1    potassium chloride (KLOR-CON) 10 mEq tablet 1 tab twice per day 60 Tab 3    varicella-zoster recombinant, PF, (SHINGRIX, PF,) 50 mcg/0.5 mL susr injection 0.5 ml IM for the first dose. Repeat in 3 months 0.5 mL 1    clotrimazole-betamethasone (LOTRISONE) topical cream APPLY TO AFFECTED AREA TWICE PER DAY 45 g 2    hydroCHLOROthiazide (MICROZIDE) 12.5 mg capsule TAKE 1 CAPSULE BY MOUTH ONCE DAILY IN THE MORNING 90 Cap 0    HYDROcodone-acetaminophen (NORCO) 5-325 mg per tablet Take 1 Tab by mouth every four (4) hours as needed for Pain.  REVLIMID 25 mg cap       aspirin delayed-release 81 mg tablet 81 mg.      acetaminophen (TYLENOL) 325 mg tablet Take  by mouth every four (4) hours as needed for Pain.  sertraline (ZOLOFT) 50 mg tablet TAKE ONE TABLET BY MOUTH ONCE DAILY 30 Tab 0    cyanocobalamin 1,000 mcg tablet Take 1,000 mcg by mouth daily.       diazepam (VALIUM) 5 mg tablet Take 5 mg by mouth every six (6) hours as needed for Anxiety.        Review of Systems:  HEENT: No epistaxis, no nasal drainage, no difficulty in swallowing, no redness in eyes  Respiratory: as above  Cardiovascular: no chest pain, no palpitations, no chronic leg edema, no syncope  Gastrointestinal: no abd pain, no vomiting, no diarrhea, no bleeding symptoms  Genitourinary: No urinary symptoms or hematuria  Integument/breast: No ulcers or rashes  Musculoskeletal:Neg  Neurological: No focal weakness, no seizures, no headaches  Behvioral/Psych: No anxiety, no depression  Constitutional: No fever, no chills, no weight loss, no night sweats     Objective:     Visit Vitals  /53 (BP 1 Location: Right arm, BP Patient Position: Sitting)   Pulse 93   Temp 97.3 °F (36.3 °C) (Oral)   Resp 18   Ht 5' 5\" (1.651 m)   Wt 64.8 kg (142 lb 12.8 oz)   SpO2 94%   BMI 23.76 kg/m²        Physical Exam:   General: comfortable, no acute distress  HEENT: pupils reactive, sclera anicteric, EOM intact  Neck: No adenopathy or thyroid swelling, no lymphadenopathy or JVD, supple  CVS: S1S2 no murmurs  RS: Mod AE bilaterally, no tactile fremitus or egophony, no accessory muscle use, bibasilar dry crackles senior care up lung fields no wheezing  Abd: soft, non tender, no hepatosplenomegaly  Neuro: non focal, awake, alert  Extrm: no leg edema, clubbing or cyanosis  Skin: no rash    Data review:   PULMONARY FUNCTION TESTS     Date FVC FEV1  FEV1/FVC [id number] TLC RV RV/TLC VC DLCO   11/10/16 103% 124% 88 57% 82% 64% 78% 102% 11.33 -62%                                                                        6-minute walk  Walked 204 m without any oxygen desaturation    PFT 11/10/16:  Flows:  Normal flows  Volumes:  Functional Residual Capacity and Residual Volume are reduced  Flow Volume Loop:  Normal Flow Volume Loop  Diffusion:  Abnormal Diffusion Capacity reduced to 62 % predicted  Impression:  Reduced diffusion capacity indicating a decrease in alveolar surface area for gas exchange, Isolated reduction of Residual Volume and Functional Residual Capacity    Imaging:  I have personally reviewed the patients radiographs and have reviewed the reports:    CT scan of chest- HRCT: 12/7/2016  IMPRESSIONS:  Moderate to marked idiopathic interstitial fibrosis, in the lower zones of both  lungs, described. Mild to moderate idiopathic interstitial fibrosis in the middle and the upper  zones of both lungs, described.   Mild COPD with mild pulmonary emphysema without any obvious bulla or bleb  formation. In the lower lobes of both lung there are findings indicating mild  bronchiectasis with mild peribronchial thickenings, without retained fluid and  without definable mucous plugs. Focal fibrotic density versus nodule in right middle lobe and apical region of  left upper lobe. Any small pulmonary nodule in lungs may be obscured by  significant interstitial fibrosis. No evidence of pathologic lymphadenopathy or mass in mediastinum or at pulmonary  hilum in either side. Evaluation of focal densities in lungs with follow CT scan, in one year, is  recommended. Barium swallow: 12/7/2016;  Esophageal dysmotility. Small sliding hiatal hernia. Gastroesophageal reflux. Single episode of silent tracheal aspiration of a trace amount of barium  contrast. Recommend consideration of modified barium swallow with speech therapy  for further evaluation. 10/20/2016  Mild interstitial fibrosis in lower lungs bilaterally. Probable mild bronchitis. No evidence of confluent pneumonia, pleural effusion or pneumothorax. Findings suggestive of mild COPD. No significant interval change. IMPRESSION:   · Cough- ILD, Pulm fibrosis related to GERD, Aspiration, ? Asbestosis , Bronchiectasis and chronic sinusitis ( allergic).   Symptomatic improvement noted with addition of Spiriva Respimat  · Abnormal CXR- fibrosis noted -last imaging study in 2016 and needs follow-up to assess further progression  · Chronic rhino sinusitis - perineal and likely trigger for chronic cough   · Multiple myeloma on treatment  · Meinere's disease      RECOMMENDATIONS:   · Discussed with patient: will focus treatment on inciting causes- GERD/aspiration and treat chronic bronchitis  · Less inclined to consider Esbriet ( typical benefit is for SOB symptoms)  · Continue inhaled Spiriva- respimet   · GERD precautions and treatment to continue  · PPI  · We will check high-resolution CT of the chest  · Pulmonary function testing  · 6-minute walk test  · Speech therapy follow up if cough persists  · Dietary instructions  · Treatment of myeloma per oncology  · Preventive vaccinations  · Will follow up in 3 months       Health maintenance screens deferred to Primary care provider.      Abdulkadir Kong MD

## 2019-10-03 ENCOUNTER — OFFICE VISIT (OUTPATIENT)
Dept: ORTHOPEDIC SURGERY | Facility: CLINIC | Age: 84
End: 2019-10-03

## 2019-10-03 VITALS
TEMPERATURE: 96.1 F | BODY MASS INDEX: 23.49 KG/M2 | HEART RATE: 85 BPM | SYSTOLIC BLOOD PRESSURE: 113 MMHG | RESPIRATION RATE: 16 BRPM | WEIGHT: 141 LBS | DIASTOLIC BLOOD PRESSURE: 54 MMHG | OXYGEN SATURATION: 98 % | HEIGHT: 65 IN

## 2019-10-03 DIAGNOSIS — M25.512 CHRONIC LEFT SHOULDER PAIN: ICD-10-CM

## 2019-10-03 DIAGNOSIS — M19.012 PRIMARY OSTEOARTHRITIS, LEFT SHOULDER: Primary | ICD-10-CM

## 2019-10-03 DIAGNOSIS — G89.29 CHRONIC LEFT SHOULDER PAIN: ICD-10-CM

## 2019-10-03 RX ORDER — BETAMETHASONE SODIUM PHOSPHATE AND BETAMETHASONE ACETATE 3; 3 MG/ML; MG/ML
6 INJECTION, SUSPENSION INTRA-ARTICULAR; INTRALESIONAL; INTRAMUSCULAR; SOFT TISSUE ONCE
Qty: 0.5 ML | Refills: 0
Start: 2019-10-03 | End: 2019-10-03

## 2019-10-03 NOTE — PROGRESS NOTES
1. Have you been to the ER, urgent care clinic since your last visit? Hospitalized since your last visit? NO    2. Have you seen or consulted any other health care providers outside of the [address] [place] [address] since your last visit? Include any pap smears or colon screening.  NO

## 2019-10-03 NOTE — PROGRESS NOTES
Verbal order given by Dr. Eduardo Davidson to draw up 4 mL 0.25% Sensorcaine and 0.5 mL 30 mg/5mL Betamethasone.

## 2019-10-03 NOTE — PROGRESS NOTES
Patient: Ruperto Byrd                MRN: [id number]       SSN: [id number]  YOB: 1931        AGE: 80 y.o. SEX: female      PCP: Amberly Vásquez MD  10/03/19    Chief Complaint   Patient presents with    Shoulder Pain     left shoulder pain, f/u appt. HISTORY:  Ruperto Byrd is a 80 y.o. female who is seen for increased left shoulder and neck pain. She does not recall any recent injuries. She felt pain while trimming bushes with short pruning sheers last year. She has been trying to sleep sitting up in a recliner. She reports mostly pain in her mid left upper arm. She has multiple myeloma. She has a chemotherapy injection every Friday at Dr. Hitesh Gutierrez office. She was previously seen for right hip and right knee pain. She previously responded to a cortisone injection for left hip bursitis. She is s/p right TKR by Dr. Le in 2009. She injured her right knee and hip while attending a bereavement meeting after the death of her  when she slipped on a tile floor in February, 2016. She has an aching in her right hip and knee. She has pain at night that radiates from her buttock into her ankle. She has difficulty laying on her right side. She is walking with a limp. Pain Assessment  10/3/2019   Location of Pain Shoulder   Location Modifiers Left   Severity of Pain 9   Quality of Pain Sharp   Quality of Pain Comment -   Duration of Pain Persistent   Frequency of Pain Constant   Aggravating Factors Bending;Stretching   Aggravating Factors Comment pt states \"I can't use it\"   Limiting Behavior Yes   Relieving Factors Nothing   Relieving Factors Comment -   Result of Injury No     Occupation, etc: Ms. Khoa Morgan  passed away in September 2015. She has 2 adult sons and one daughter. She lives with her daughter Victor Manuel [place] number is [phone]. She lives in [place] and is very indepenent.   She is still driving and does her own house and yard work. 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 She does not walk for exercise anymore. She had a flat tire on the way to her appointment today. She is very hard of hearing and reports loss of both ears now. Ms. Jude Prado has her sister with her today so she doesn't miss anything. Last 3 Recorded Weights in this Encounter    10/03/19 1134   Weight: 141 lb (64 kg)     Body mass index is 23.46 kg/m². Patient Active Problem List   Diagnosis Code    Pain in limb M79.609    Essential hypertension, benign I10    Meniere's disease, unspecified H81.09    Spinal stenosis of lumbar region M48.061    Unresolved grief F43.21    ILD [facility]interstitial lung disease) ([facility] J84.9    Syphilis, latent A53.0    Multiple myeloma in remission ([facility] C90.01       REVIEW OF SYSTEMS: All Below are Negative except: See HPI     Constitutional: negative for fever, chills, and weight loss. Cardiovascular: negative for chest pain, claudication, leg swelling, SOB, MURILLO   Gastrointestinal: Negative for pain, N/V/C/D, Blood in stool or urine, dysuria,  hematuria, incontinence, pelvic pain. Musculoskeletal: See HPI   Neurological: Negative for dizziness and weakness. Negative for headaches, Visual changes, confusion, seizures   Phychiatric/Behavioral: Negative for depression, memory loss, substance  abuse. Extremities: Negative for hair changes, rash, or skin lesion changes. Hematologic: Negative for bleeding problems, bruising, pallor or swollen lymph  nodes   Peripheral Vascular: No calf pain, no circulation deficits.     Social History     Socioeconomic History    Marital status:      Spouse name: Not on file    Number of children: Not on file    Years of education: Not on file    Highest education level: Not on file   Occupational History    Not on file   Social Needs    Financial resource strain: Not on file    Food insecurity:     Worry: Not on file     Inability: Not on file   [employer] needs:     Medical: Not on file     Non-medical: Not on file   Tobacco Use    Smoking status: Never Smoker    Smokeless tobacco: Never Used   Substance and Sexual Activity    Alcohol use: No    Drug use: No    Sexual activity: Not Currently   Lifestyle    Physical activity:     Days per week: Not on file     Minutes per session: Not on file    Stress: Not on file   Relationships    Social connections:     Talks on phone: Not on file     Gets together: Not on file     Attends [employer] service: Not on file     Active member of club or organization: Not on file     Attends meetings of clubs or organizations: Not on file     Relationship status: Not on file    Intimate partner violence:     Fear of current or ex partner: Not on file     Emotionally abused: Not on file     Physically abused: Not on file     Forced sexual activity: Not on file   Other Topics Concern    Not on file   Social History Narrative    Not on file        Allergies   Allergen Reactions    Penicillin V Potassium Diarrhea     States not allergic    Shellfish Derived Swelling        Current Outpatient Medications   Medication Sig    tiotropium bromide (SPIRIVA RESPIMAT) 2.5 mcg/actuation inhaler Take 2 Puffs by inhalation daily.  pantoprazole (PROTONIX) 40 mg tablet Take 1 Tab by mouth daily.  potassium chloride (KLOR-CON) 10 mEq tablet 1 tab twice per day    varicella-zoster recombinant, PF, (SHINGRIX, PF,) 50 mcg/0.5 mL susr injection 0.5 ml IM for the first dose. Repeat in 3 months    clotrimazole-betamethasone (LOTRISONE) topical cream APPLY TO AFFECTED AREA TWICE PER DAY    hydroCHLOROthiazide (MICROZIDE) 12.5 mg capsule TAKE 1 CAPSULE BY MOUTH ONCE DAILY IN THE MORNING    REVLIMID 25 mg cap     aspirin delayed-release 81 mg tablet 81 mg.    acetaminophen (TYLENOL) 325 mg tablet Take  by mouth every four (4) hours as needed for Pain.     sertraline (ZOLOFT) 50 mg tablet TAKE ONE TABLET BY MOUTH ONCE DAILY    cyanocobalamin 1,000 mcg tablet Take 1,000 mcg by mouth daily.  diazepam (VALIUM) 5 mg tablet Take 5 mg by mouth every six (6) hours as needed for Anxiety.  HYDROcodone-acetaminophen (NORCO) 5-325 mg per tablet Take 1 Tab by mouth every four (4) hours as needed for Pain. No current facility-administered medications for this visit.        PHYSICAL EXAMINATION:  Visit Vitals  /54 (BP 1 Location: Right arm, BP Patient Position: Sitting)   Pulse 85   Temp 96.1 °F (35.6 °C) (Oral)   Resp 16   Ht 5' 5\" (1.651 m)   Wt 141 lb (64 kg)   SpO2 98%   BMI 23.46 kg/m²        ORTHO EXAMINATION:  Examination Left shoulder   Skin Intact   Effusion -   Biceps deformity -   Atrophy -   AC joint tenderness -   Acromial tenderness +   Biceps tenderness -   Forward flexion/Elevation  with pain   Active abduction    External rotation ROM 10   Internal rotation ROM 95   Apprehension -   Impingement -   Drop Arm Test -   Neurovascular Intact   Positive drop arm test    Examination Right hip   Skin Intact   External Rotation ROM 10   Internal Rotation ROM 5   Trochanteric tenderness +   Hip flexion contracture -   Antalgic gait +   Trendelenberg sign -   Lumbar tenderness -   Straight leg raise -   Calf tenderness -   Neurovascular Intact     Examination Right knee   Skin Well healed incision   Range of motion 120-0   Effusion -   Medial joint line tenderness +   Lateral joint line tenderness -   Popliteal tenderness -   Osteophytes palpable -   Ronalds -   Patella crepitus -   Anterior drawer -   Lateral laxity -   Medial laxity -   Varus deformity -   Valgus deformity -   Pretibial edema -   Calf tenderness -       Chart reviewed for the following:   Hola SYED Che, MD, have reviewed the History, Physical and updated the Allergic reactions for Rahu 37 performed immediately prior to start of procedure:  Luca Boss MD, have performed the following reviews on Raissa Morataya prior to the start of the procedure:            * Patient was identified by name and date of birth   * Agreement on procedure being performed was verified  * Risks and Benefits explained to the patient  * Procedure site verified and marked as necessary  * Patient was positioned for comfort  * Consent was obtained     Time: 11:47 AM    Date of procedure: 10/3/2019    Procedure performed by:  Ajay Walsh MD    Ms. Mir tolerated the procedure well with no complications. RADIOGRAPHS:    XR LEFT HUMERUS 10/26/18 [facility]  IMPRESSION:  Three views - No fractures or lytic lesions, moderate acromioclavicular narrowing, mild glenohumeral narrowing, no calcific densities. Osteopenia of humeral head at greater tuberosity. Some flattening at rotator cuff attachment site. Type 3 acromion     XR RIGHT KNEE 3/21/16 [facility]  Three views of right knee: well fixed implants, in satisfactory position and alignment. AP pelvis and two views of right hip: no fractures, mild joint space narrowing, no osteophytes present. IMPRESSION:      ICD-10-CM ICD-9-CM    1. Primary osteoarthritis, left shoulder M19.012 715.11 betamethasone (CELESTONE SOLUSPAN) 6 mg/mL injection      BETAMETHASONE ACETATE & SODIUM PHOSPHATE INJECTION 3 MG EA.      DRAIN/INJECT LARGE JOINT/BURSA   2. Chronic left shoulder pain M25.512 719.41 betamethasone (CELESTONE SOLUSPAN) 6 mg/mL injection    G89.29 338.29 BETAMETHASONE ACETATE & SODIUM PHOSPHATE INJECTION 3 MG EA.      DRAIN/INJECT LARGE JOINT/BURSA     PLAN: After discussing treatment options, patient's left shoulder was injected with 4 cc Marcaine and 1/2 cc Celestone. There is no need for surgery at this time. Unable to have MRI due to cochlear implant. She also has a shellfish allergy so she is unable to have contrast media. She will follow up as needed.      Scribed by Francisco Javier Loyola  ([address]) as dictated by Ajay Walsh MD

## 2019-10-04 ENCOUNTER — CLINICAL SUPPORT (OUTPATIENT)
Dept: PULMONOLOGY | Age: 84
End: 2019-10-04

## 2019-10-04 VITALS — BODY MASS INDEX: 23.49 KG/M2 | HEIGHT: 65 IN | WEIGHT: 141 LBS

## 2019-10-04 DIAGNOSIS — J84.9 ILD (INTERSTITIAL LUNG DISEASE) (HCC): Primary | ICD-10-CM

## 2019-10-04 NOTE — PROGRESS NOTES
Unable to preform 6 minute walk at present time due to acrylic nail polish and ear probe unable to get a reading. Patient states she has an appointment to have her nails done and will have it removed for next visit. Patient had difficulty with testing due to difficulty hearing directions and memory issues.

## 2019-10-04 NOTE — PROGRESS NOTES
Unable to preform 6 minute walk due to patient wearing acrylic nail polish and ear probe unable to get a reading at present time. Patient states she will have it removed for next time.

## 2019-10-07 ENCOUNTER — OFFICE VISIT (OUTPATIENT)
Dept: FAMILY MEDICINE CLINIC | Facility: CLINIC | Age: 84
End: 2019-10-07

## 2019-10-07 VITALS
HEART RATE: 67 BPM | BODY MASS INDEX: 23.32 KG/M2 | RESPIRATION RATE: 14 BRPM | WEIGHT: 140 LBS | SYSTOLIC BLOOD PRESSURE: 122 MMHG | OXYGEN SATURATION: 97 % | TEMPERATURE: 97.7 F | DIASTOLIC BLOOD PRESSURE: 58 MMHG | HEIGHT: 65 IN

## 2019-10-07 DIAGNOSIS — G93.40 ENCEPHALOPATHY: ICD-10-CM

## 2019-10-07 DIAGNOSIS — E87.1 HYPONATREMIA: ICD-10-CM

## 2019-10-07 DIAGNOSIS — I10 ESSENTIAL HYPERTENSION, BENIGN: ICD-10-CM

## 2019-10-07 DIAGNOSIS — C90.00 MULTIPLE MYELOMA NOT HAVING ACHIEVED REMISSION (HCC): ICD-10-CM

## 2019-10-07 DIAGNOSIS — J47.9 BRONCHIECTASIS WITHOUT ACUTE EXACERBATION (HCC): Primary | ICD-10-CM

## 2019-10-07 LAB
A-G RATIO,AGRAT: 1.1 RATIO (ref 1.1–2.6)
ALBUMIN SERPL-MCNC: 3.6 G/DL (ref 3.5–5)
ALP SERPL-CCNC: 72 U/L (ref 40–120)
ALT SERPL-CCNC: 15 U/L (ref 5–40)
ANION GAP SERPL CALC-SCNC: 15.7 MMOL/L
AST SERPL W P-5'-P-CCNC: 17 U/L (ref 10–37)
BILIRUB SERPL-MCNC: 0.3 MG/DL (ref 0.2–1.2)
BUN SERPL-MCNC: 28 MG/DL (ref 6–22)
CALCIUM SERPL-MCNC: 9.7 MG/DL (ref 8.4–10.5)
CHLORIDE SERPL-SCNC: 101 MMOL/L (ref 98–110)
CO2 SERPL-SCNC: 24 MMOL/L (ref 20–32)
CREAT SERPL-MCNC: 0.9 MG/DL (ref 0.8–1.4)
GFRAA, 66117: >60
GFRNA, 66118: 55.5
GLOBULIN,GLOB: 3.2 G/DL (ref 2–4)
GLUCOSE SERPL-MCNC: 93 MG/DL (ref 70–99)
POTASSIUM SERPL-SCNC: 4 MMOL/L (ref 3.5–5.5)
PROT SERPL-MCNC: 6.8 G/DL (ref 6.2–8.1)
SODIUM SERPL-SCNC: 141 MMOL/L (ref 133–145)

## 2019-10-07 RX ORDER — IXAZOMIB 3 MG/1
CAPSULE ORAL
COMMUNITY
Start: 2019-09-27 | End: 2020-10-06

## 2019-10-07 NOTE — PROGRESS NOTES
Sheryle Smart presents today for   Chief Complaint   Patient presents with    Jimmy Woman       Sheryle Smart preferred language for health care discussion is [address] Is someone accompanying this pt? Yes daughter    Is the patient using any DME equipment during [address]? no    Depression Screening:  3 most recent PHQ Screens 10/20/2016   Little interest or pleasure in doing things More than half the days   Feeling down, depressed, irritable, or hopeless More than half the days   Total Score PHQ 2 4   Trouble falling or staying asleep, or sleeping too much Nearly every day   Feeling tired or having little energy Nearly every day   Poor appetite, weight loss, or overeating Not at all   Feeling bad about yourself - or that you are a failure or have let yourself or your family down Not at all   Trouble concentrating on things such as school, work, reading, or watching TV Not at all   Moving or speaking so slowly that other people could have noticed; or the opposite being so fidgety that others notice Several days   Thoughts of being better off dead, or hurting yourself in some way Not at all   PHQ 9 Score 11   How difficult have these problems made it for you to do your work, take care of your home and get along with others Not difficult at all       Learning Assessment:  Learning Assessment 1/8/2016   PRIMARY LEARNER Patient   HIGHEST LEVEL OF EDUCATION - PRIMARY LEARNER  GRADUATED HIGH SCHOOL OR GED   BARRIERS PRIMARY LEARNER NONE   CO-LEARNER CAREGIVER No   PRIMARY LANGUAGE ENGLISH    NEED No   LEARNER PREFERENCE PRIMARY VIDEOS     DEMONSTRATION     READING     PICTURES   ANSWERED BY patient   RELATIONSHIP SELF       Abuse Screening:  Abuse Screening Questionnaire 9/4/2019   Do you ever feel afraid of your partner? N   Are you in a relationship with someone who physically or mentally threatens you? N   Is it safe for you to go home?  Y       Fall Risk  Fall Risk Assessment, last 12 mths 10/7/2019   Able to walk? Yes   Fall in past 12 months? Yes   Fall with injury? -   Number of falls in past 12 months 5   Fall Risk Score -       Health Maintenance reviewed and discussed and ordered per Provider. Health Maintenance Due   Topic Date Due    Shingrix Vaccine Age 49> (1 of 2) 07/23/1981    Bone Densitometry (Dexa) Screening  07/23/1996    Pneumococcal 65+ years (2 of 2 - PCV13) 09/19/2012    DTaP/Tdap/Td series (1 - Tdap) 08/17/2018    BREAST CANCER SCRN MAMMOGRAM  08/24/2018   . Jewels Duff is updated on all     Pt currently taking Antiplatelet therapy? no    Coordination of Care:  1. Have you been to the ER, urgent care clinic since your last visit? Yes 8/2019 fall [place] hurt left shoulder Hospitalized since your last visit? no    2. Have you seen or consulted any other health care providers outside of the [address] [place] [address] since your last visit? no Include any pap smears or colon screening.  no

## 2019-10-09 ENCOUNTER — TELEPHONE (OUTPATIENT)
Dept: FAMILY MEDICINE CLINIC | Facility: CLINIC | Age: 84
End: 2019-10-09

## 2019-10-10 NOTE — PROGRESS NOTES
The patient presents to the office today with the chief complaint of confusion    HPI    The patient has bronchiectasis. The patient has a chronic cough and chronic dyspnea. This is been stable. The patient has been on treatment for multiple myeloma. Recent this treatment has been changed. Over the past week the patient has become increasingly confused. She recognizes her family members and engaged in conversation. She very quickly however gets \"off track\" and makes numerous mistakes with facts. The the patient has been noted to have a mildly low sodium in the past. The patient remains on HCTZ for hypertension. She is doing well on the medication. ROS  Chronic cough and confusion as above  Negative for fever, chest pain, or focal weakness. Allergies   Allergen Reactions    Penicillin V Potassium Diarrhea     States not allergic    Shellfish Derived Swelling       Current Outpatient Medications   Medication Sig Dispense Refill    NINLARO 3 mg cap Takes 1 a week for 3 weeks then off a week      tiotropium bromide (SPIRIVA RESPIMAT) 2.5 mcg/actuation inhaler Take 2 Puffs by inhalation daily.  potassium chloride (KLOR-CON) 10 mEq tablet 1 tab twice per day 60 Tab 3    hydroCHLOROthiazide (MICROZIDE) 12.5 mg capsule TAKE 1 CAPSULE BY MOUTH ONCE DAILY IN THE MORNING 90 Cap 0    HYDROcodone-acetaminophen (NORCO) 5-325 mg per tablet Take 1 Tab by mouth every four (4) hours as needed for Pain.  aspirin delayed-release 81 mg tablet 81 mg.      acetaminophen (TYLENOL) 325 mg tablet Take  by mouth every four (4) hours as needed for Pain.  sertraline (ZOLOFT) 50 mg tablet TAKE ONE TABLET BY MOUTH ONCE DAILY 30 Tab 0    cyanocobalamin 1,000 mcg tablet Take 1,000 mcg by mouth daily.  diazepam (VALIUM) 5 mg tablet Take 5 mg by mouth every six (6) hours as needed for Anxiety.          Past Medical History:   Diagnosis Date    Acute bronchitis     Acute upper respiratory infections of unspecified site     Cough     Essential hypertension, benign     Interstitial lung disease ([facility] [address]     Meniere's disease, unspecified     Muscle pain     Pain in limb        Past Surgical History:   Procedure Laterality Date    HX HYSTERECTOMY      HX MOHS PROCEDURES Left        Social History     Socioeconomic History    Marital status:      Spouse name: Not on file    Number of children: Not on file    Years of education: Not on file    Highest education level: Not on file   Occupational History    Not on file   Social Needs    Financial resource strain: Not on file    Food insecurity:     Worry: Not on file     Inability: Not on file    Transportation needs:     Medical: Not on file     Non-medical: Not on file   Tobacco Use    Smoking status: Never Smoker    Smokeless tobacco: Never Used   Substance and Sexual Activity    Alcohol use: No    Drug use: No    Sexual activity: Not Currently   Lifestyle    Physical activity:     Days per week: Not on file     Minutes per session: Not on file    Stress: Not on file   Relationships    Social connections:     Talks on phone: Not on file     Gets together: Not on file     Attends Evangelical service: Not on file     Active member of club or organization: Not on file     Attends meetings of clubs or organizations: Not on file     Relationship status: Not on file    Intimate partner violence:     Fear of current or ex partner: Not on file     Emotionally abused: Not on file     Physically abused: Not on file     Forced sexual activity: Not on file   Other Topics Concern    Not on file   Social History Narrative    Not on file       Patient does have an advanced directive on file    Visit Vitals  /58 (BP 1 Location: Right arm, BP Patient Position: Sitting)   Pulse 67   Temp 97.7 °F (36.5 °C) (Tympanic)   Resp 14   Ht 5' 5\" (1.651 m)   Wt 140 lb (63.5 kg)   SpO2 97%   BMI 23.30 kg/m²       Physical Exam  Lungs are normal to percussion. There are fine dry rales throughout the chest.  Cardiac exam: S1-S2 normal, no gallops, no murmurs  No carotid bruits  Abdomen is soft and nontender  The patient recognizes the examiner. She follows directions with prompting. She does however have mild pressure of speech stating irrelevant or incorrect facts. Office Visit on 10/07/2019   Component Date Value Ref Range Status    Glucose 10/07/2019 93  70 - 99 mg/dL Final    BUN 10/07/2019 28* 6 - 22 mg/dL Final    Creatinine 10/07/2019 0.9  0.8 - 1.4 mg/dL Final    Sodium 10/07/2019 141  133 - 145 mmol/L Final    Potassium 10/07/2019 4.0  3.5 - 5.5 mmol/L Final    Chloride 10/07/2019 101  98 - 110 mmol/L Final    CO2 10/07/2019 24  20 - 32 mmol/L Final    AST (SGOT) 10/07/2019 17  10 - 37 U/L Final    ALT (SGPT) 10/07/2019 15  5 - 40 U/L Final    Alk. phosphatase 10/07/2019 72  40 - 120 U/L Final    Bilirubin, total 10/07/2019 0.3  0.2 - 1.2 mg/dL Final    Calcium 10/07/2019 9.7  8.4 - 10.5 mg/dL Final    Protein, total 10/07/2019 6.8  6.2 - 8.1 g/dL Final    Albumin 10/07/2019 3.6  3.5 - 5.0 g/dL Final    A-G Ratio 10/07/2019 1.1  1.1 - 2.6 ratio Final    Globulin 10/07/2019 3.2  2.0 - 4.0 g/dL Final    Anion gap 10/07/2019 15.7  mmol/L Final    Comment: Test includes Albumin, Alkaline Phosphatase, ALT, AST, BUN, Calcium, CO2,  Chloride, Creatinine, Glucose, Potassium, Sodium, Total Bilirubin and Total  Protein. Estimated GFR results are reported in mL/min/1.73 sq.m. by the MDRD equation. This eGFR is validated for stable chronic renal failure patients. This   equation  is unreliable in acute illness or patients with normal renal function.       GFRAA 10/07/2019 >60.0  >60.0 Final    GFRNA 10/07/2019 55.5* >60.0 Final   Hospital Outpatient Visit on 08/15/2019   Component Date Value Ref Range Status    WBC 08/15/2019 4.0* 4.6 - 13.2 K/uL Final    RBC 08/15/2019 3.42* 4.20 - 5.30 M/uL Final    HGB 08/15/2019 10.1* 12.0 - 16.0 g/dL Final  HCT 08/15/2019 31.3* 35.0 - 45.0 % Final    MCV 08/15/2019 91.5  74.0 - 97.0 FL Final    MCH 08/15/2019 29.5  24.0 - 34.0 PG Final    MCHC 08/15/2019 32.3  31.0 - 37.0 g/dL Final    RDW 08/15/2019 17.4* 11.6 - 14.5 % Final    PLATELET 90/36/9943 81* 135 - 420 K/uL Final    NEUTROPHILS 08/15/2019 65  40 - 73 % Final    LYMPHOCYTES 08/15/2019 18* 21 - 52 % Final    MONOCYTES 08/15/2019 16* 3 - 10 % Final    EOSINOPHILS 08/15/2019 1  0 - 5 % Final    BASOPHILS 08/15/2019 0  0 - 2 % Final    ABS. NEUTROPHILS 08/15/2019 2.6  1.8 - 8.0 K/UL Final    ABS. LYMPHOCYTES 08/15/2019 0.7* 0.9 - 3.6 K/UL Final    ABS. MONOCYTES 08/15/2019 0.6  0.05 - 1.2 K/UL Final    ABS. EOSINOPHILS 08/15/2019 0.1  0.0 - 0.4 K/UL Final    ABS. BASOPHILS 08/15/2019 0.0  0.0 - 0.1 K/UL Final    DF 08/15/2019 AUTOMATED    Final    Sodium 08/15/2019 132* 136 - 145 mmol/L Final    Potassium 08/15/2019 2.8* 3.5 - 5.5 mmol/L Final    Comment: Unsuccessful attempt to notify of critical values. Referred to client services for call to physician in a.m. CALLED TO AND CORRECTLY REPEATED BY:  [facility] NURSE [phone] ON [id number] TO RICHELLE      Chloride 08/15/2019 96* 100 - 111 mmol/L Final    PLEASE NOTE NEW REFERENCE RANGE    CO2 08/15/2019 26  21 - 32 mmol/L Final    Anion gap 08/15/2019 10  3.0 - 18 mmol/L Final    Glucose 08/15/2019 100* 74 - 99 mg/dL Final    BUN 08/15/2019 15  7.0 - 18 MG/DL Final    Creatinine 08/15/2019 1.05  0.6 - 1.3 MG/DL Final    BUN/Creatinine ratio 08/15/2019 14  12 - 20   Final    GFR est AA 08/15/2019 60* >60 ml/min/1.73m2 Final    GFR est non-AA 08/15/2019 49* >60 ml/min/1.73m2 Final    Comment: (NOTE)  Estimated GFR is calculated using the Modification of Diet in Renal   Disease (MDRD) Study equation, reported for both  Americans   (GFRAA) and non- Americans (GFRNA), and normalized to 1.73m2   body surface area.  The physician must decide which value applies to   the patient. The MDRD study equation should only be used in   individuals age 25 or older. It has not been validated for the   following: pregnant women, patients with serious comorbid conditions,   or on certain medications, or persons with extremes of body size,   muscle mass, or nutritional status.  Calcium 08/15/2019 8.4* 8.5 - 10.1 MG/DL Final    Bilirubin, total 08/15/2019 1.4* 0.2 - 1.0 MG/DL Final    ALT (SGPT) 08/15/2019 32  13 - 56 U/L Final    AST (SGOT) 08/15/2019 26  10 - 38 U/L Final    PLEASE NOTE NEW REFERENCE RANGE    Alk. phosphatase 08/15/2019 76  45 - 117 U/L Final    Protein, total 08/15/2019 6.6  6.4 - 8.2 g/dL Final    Albumin 08/15/2019 3.3* 3.4 - 5.0 g/dL Final    Globulin 08/15/2019 3.3  2.0 - 4.0 g/dL Final    A-G Ratio 08/15/2019 1.0  0.8 - 1.7   Final       .  Results for orders placed or performed in visit on 48/45/14   METABOLIC PANEL, COMPREHENSIVE   Result Value Ref Range    Glucose 93 70 - 99 mg/dL    BUN 28 (H) 6 - 22 mg/dL    Creatinine 0.9 0.8 - 1.4 mg/dL    Sodium 141 133 - 145 mmol/L    Potassium 4.0 3.5 - 5.5 mmol/L    Chloride 101 98 - 110 mmol/L    CO2 24 20 - 32 mmol/L    AST (SGOT) 17 10 - 37 U/L    ALT (SGPT) 15 5 - 40 U/L    Alk. phosphatase 72 40 - 120 U/L    Bilirubin, total 0.3 0.2 - 1.2 mg/dL    Calcium 9.7 8.4 - 10.5 mg/dL    Protein, total 6.8 6.2 - 8.1 g/dL    Albumin 3.6 3.5 - 5.0 g/dL    A-G Ratio 1.1 1.1 - 2.6 ratio    Globulin 3.2 2.0 - 4.0 g/dL    Anion gap 15.7 mmol/L    GFRAA >60.0 >60.0    GFRNA 55.5 (L) >60.0    Narrative    Unless additionally indicated, test performed at: [facility], [address] [place] [address]. PH: [phone]. Assessment / Plan      ICD-10-CM ICD-9-CM    1. Bronchiectasis without acute exacerbation (HCC) J47.9 494.0    2. Essential hypertension, benign L90 953.8 METABOLIC PANEL, COMPREHENSIVE   3.  Multiple myeloma not having achieved remission (HCC) W06.03 698.43 METABOLIC PANEL, COMPREHENSIVE   4. Encephalopathy W09.03 556.78 METABOLIC PANEL, COMPREHENSIVE   5. Hyponatremia I69.9 220.9 METABOLIC PANEL, COMPREHENSIVE       Bronchiectasis stable  Hypertension doing well  Acute encephalopathy of uncertain cause. The patient is at risk for symptomatic hyponatremia or hypercalcemia due to multiple myeloma    Lab work ordered  she was advised to continue her maintenance medications  Further plans will be based on the lab test results. Follow-up and Dispositions    · Return in about 9 weeks (around 12/9/2019). I asked the patient and her daughter for any questions and I answered their questions. They state that they understand the treatment plan and agree with the treatment plan          THIS DOCUMENT WAS CREATED WITH A VOICE ACTIVATED DICTATION SYSTEM.   IT MAY CONTAIN TRANSCRIPTION ERRORS

## 2019-10-14 RX ORDER — HYDROCHLOROTHIAZIDE 12.5 MG/1
CAPSULE ORAL
Qty: 90 CAP | Refills: 0 | Status: SHIPPED | OUTPATIENT
Start: 2019-10-14

## 2019-10-24 ENCOUNTER — OFFICE VISIT (OUTPATIENT)
Dept: PULMONOLOGY | Age: 84
End: 2019-10-24

## 2019-10-24 VITALS
HEIGHT: 65 IN | BODY MASS INDEX: 22.99 KG/M2 | OXYGEN SATURATION: 93 % | WEIGHT: 138 LBS | DIASTOLIC BLOOD PRESSURE: 60 MMHG | HEART RATE: 86 BPM | TEMPERATURE: 98.1 F | RESPIRATION RATE: 18 BRPM | SYSTOLIC BLOOD PRESSURE: 140 MMHG

## 2019-10-24 DIAGNOSIS — C90.01 MULTIPLE MYELOMA IN REMISSION (HCC): ICD-10-CM

## 2019-10-24 DIAGNOSIS — Z23 ENCOUNTER FOR IMMUNIZATION: ICD-10-CM

## 2019-10-24 DIAGNOSIS — J84.9 ILD (INTERSTITIAL LUNG DISEASE) (HCC): Primary | ICD-10-CM

## 2019-10-24 DIAGNOSIS — I10 ESSENTIAL HYPERTENSION, BENIGN: ICD-10-CM

## 2019-10-24 LAB
C-REACTIVE PROTEIN, QT, 006627: 1.8 MG/DL (ref 0–0.5)
CK SERPL-CCNC: 32 U/L (ref 30–165)
SED RATE (ESR): 102 MM/HR (ref 0–30)

## 2019-10-24 RX ORDER — DEXAMETHASONE 4 MG/1
40 TABLET ORAL DAILY
Refills: 5 | COMMUNITY
Start: 2019-10-14

## 2019-10-24 NOTE — PROGRESS NOTES
Boris Dyson presents today for   Chief Complaint   Patient presents with    Cough     follow up from 9/24/2019    Shortness of Breath    Other     ILD    Results     PFt 10/4/2019, CT 10/10/2019       Is someone accompanying this pt? Yes. daughter    Is the patient using any DME equipment during OV? Yes. Rollator/walker    -DME Company N/A    Depression Screening:  3 most recent PHQ Screens 10/24/2019   Little interest or pleasure in doing things More than half the days   Feeling down, depressed, irritable, or hopeless More than half the days   Total Score PHQ 2 4   Trouble falling or staying asleep, or sleeping too much -   Feeling tired or having little energy -   Poor appetite, weight loss, or overeating -   Feeling bad about yourself - or that you are a failure or have let yourself or your family down -   Trouble concentrating on things such as school, work, reading, or watching TV -   Moving or speaking so slowly that other people could have noticed; or the opposite being so fidgety that others notice -   Thoughts of being better off dead, or hurting yourself in some way -   PHQ 9 Score -   How difficult have these problems made it for you to do your work, take care of your home and get along with others -       Learning Assessment:  Learning Assessment 1/8/2016   PRIMARY LEARNER Patient   HIGHEST LEVEL OF EDUCATION - PRIMARY LEARNER  GRADUATED HIGH SCHOOL OR GED   BARRIERS PRIMARY LEARNER NONE   CO-LEARNER CAREGIVER No   PRIMARY LANGUAGE ENGLISH    NEED No   LEARNER PREFERENCE PRIMARY VIDEOS     DEMONSTRATION     READING     PICTURES   ANSWERED BY patient   RELATIONSHIP SELF       Abuse Screening:  Abuse Screening Questionnaire 9/4/2019   Do you ever feel afraid of your partner? N   Are you in a relationship with someone who physically or mentally threatens you? N   Is it safe for you to go home? Y       Fall Risk  Fall Risk Assessment, last 12 mths 10/24/2019   Able to walk?  Yes Fall in past 12 months? Yes   Fall with injury? Yes   Number of falls in past 12 months 1   Fall Risk Score 2         Coordination of Care:  1. Have you been to the ER, urgent care clinic since your last visit? Hospitalized since your last visit? No    2. Have you seen or consulted any other health care providers outside of the [address] [place] [address] since your last visit? Include any pap smears or colon screening. Yes.  Dr. Rashaun Walker, oncologist, Dr. Armando Rincon, Ophthalmologist

## 2019-10-24 NOTE — PROGRESS NOTES
Bernard Hankins is a 80 y.o. female who presents for routine immunizations. She denies any symptoms , reactions or allergies that would exclude them from being immunized today. Risks and adverse reactions were discussed and the VIS was given to them. All questions were addressed. She was observed for 10 min post injection. There were no reactions observed.     Susi Morataya

## 2019-10-24 NOTE — LETTER
10/24/19 Patient: Cassidy Nurse YOB: 1931 Date of Visit: 10/24/2019 Neville Severin, MD 
[address] [place] [address] VIA In Basket Dear Neville Severin, MD, Thank you for referring Ms. Chin to [address] [place] [address] for evaluation. My notes for this consultation are attached. If you have questions, please do not hesitate to call me. I look forward to following your patient along with you.  
 
 
Sincerely, 
 
Javier Nicholson MD

## 2019-10-24 NOTE — PROGRESS NOTES
[facility]  Pulmonary, Critical Care, and Sleep Medicine      Pulmonary Office visit    Name: Les Padilla     : 1931     Date: 10/24/2019        Subjective:     Patient is a 80 y.o. female is referred for evaluation of cough. 10/24/19   Patient completed HRCT and pulmonary function testing and is here to discuss further    She has been diagnosed with multiple myeloma and currently undergoing treatment with Revlimid. States that she is tolerating her therapy well. In late spring she started with coughing again which seem to persist despite prednisone taper and antibiotics and was more recently seen by Jeffrey Cheney nurse practitioner in our clinic and was prescribed Spiriva Respimat which she has been using daily with significant improvement in overall symptoms of cough. Mentions some increase in mucus with the change of season  She denies any hemoptysis. Denies any fever or chills  In the past she was diagnosed with interstitial lung disease with symptoms of chronic persistent cough which had improved with treatment and speech therapy recommendations. Has had some episodes of cough- following with speech therapy for safe swallowing therapy. Modified barium swallow recommended. Denies fever, chills. Has had some weight loss, minimal weakness but no significant effect on activities of daily living  Has not had her influenza vaccination for this season-wishes to take it today    HPI:  She describes chronic cough all year around with worsening in winter. Cough starts with bronchitis and persists cough productive of milky- yellow mucus and sometimes difficulty expectoration. C/o stuffy nose and post nasal drip.denies headache. Uses nasal sprays- Flonase. Takes allegra for ears congestion. Not  On any inhalers. Denies wheezing. Denies any exposure to allergens per se but daughter has indoor dog. Denies Pneumonia. Occasional acid reflux.   C/o arthritic joint pain  Denies pedal edema, PND, orthopnea  Non smoker and no industrial exposure. Spouse worked at [employer] for 30 + years- Planner and  for most of his career but had asbestos exposure in early years. Past Medical History:   Diagnosis Date    Acute bronchitis     Acute upper respiratory infections of unspecified site     Cough     Essential hypertension, benign     Interstitial lung disease (HCC)     Meniere's disease, unspecified     Muscle pain     Pain in limb        Past Surgical History:   Procedure Laterality Date    HX HYSTERECTOMY      HX MOHS PROCEDURES Left      Allergies   Allergen Reactions    Penicillin V Potassium Diarrhea     States not allergic    Shellfish Derived Swelling     Current Outpatient Medications   Medication Sig Dispense Refill    dexAMETHasone (DECADRON) 4 mg tablet TAKE 10 TABLETS BY MOUTH ONCE A WEEK (40MG A WEEK)  5    hydroCHLOROthiazide (MICROZIDE) 12.5 mg capsule TAKE 1 CAPSULE BY MOUTH ONCE DAILY IN THE MORNING 90 Cap 0    NINLARO 3 mg cap Takes 1 a week for 3 weeks then off a week      tiotropium bromide (SPIRIVA RESPIMAT) 2.5 mcg/actuation inhaler Take 2 Puffs by inhalation daily.  potassium chloride (KLOR-CON) 10 mEq tablet 1 tab twice per day 60 Tab 3    HYDROcodone-acetaminophen (NORCO) 5-325 mg per tablet Take 1 Tab by mouth every four (4) hours as needed for Pain.  aspirin delayed-release 81 mg tablet Take 81 mg by mouth daily.  acetaminophen (TYLENOL) 325 mg tablet Take  by mouth every four (4) hours as needed for Pain.  sertraline (ZOLOFT) 50 mg tablet TAKE ONE TABLET BY MOUTH ONCE DAILY (Patient taking differently: Take 50 mg by mouth daily as needed.) 30 Tab 0    cyanocobalamin 1,000 mcg tablet Take 1,000 mcg by mouth daily.  diazepam (VALIUM) 5 mg tablet Take 5 mg by mouth every six (6) hours as needed for Anxiety.        Review of Systems:  HEENT: No epistaxis, no nasal drainage, no difficulty in swallowing, no redness in eyes  Respiratory: as above  Cardiovascular: no chest pain, no palpitations, no chronic leg edema, no syncope  Gastrointestinal: no abd pain, no vomiting, no diarrhea, no bleeding symptoms  Genitourinary: No urinary symptoms or hematuria  Integument/breast: No ulcers or rashes  Musculoskeletal:Neg  Neurological: No focal weakness, no seizures, no headaches  Behvioral/Psych: No anxiety, no depression  Constitutional: No fever, no chills, no weight loss, no night sweats     Objective:     Visit Vitals  /60 (BP 1 Location: Right arm, BP Patient Position: Sitting)   Pulse 86   Temp 98.1 °F (36.7 °C) (Oral)   Resp 18   Ht 5' 5\" (1.651 m)   Wt 62.6 kg (138 lb)   SpO2 93%   BMI 22.96 kg/m²        Physical Exam:   General: comfortable, no acute distress  HEENT: pupils reactive, sclera anicteric, EOM intact  Neck: No adenopathy or thyroid swelling, no lymphadenopathy or JVD, supple  CVS: S1S2 no murmurs  RS: Mod AE bilaterally, no tactile fremitus or egophony, no accessory muscle use, bibasilar dry crackles long-term up lung fields no wheezing  Abd: soft, non tender, no hepatosplenomegaly  Neuro: non focal, awake, alert  Extrm: no leg edema, clubbing or cyanosis  Skin: no rash    Data review:   PULMONARY FUNCTION TESTS     Date FVC FEV1  FEV1/FVC [id number] TLC RV RV/TLC VC DLCO   11/10/16 103% 124% 88 57% 82% 64% 78% 102% 11.33 -62%   10/7/2019  72  86  normal    68  61    74  41/67%                                                  6-minute walk  Walked 204 m without any oxygen desaturation    PFT 11/10/16:  Flows:  Normal flows  Volumes:  Functional Residual Capacity and Residual Volume are reduced  Flow Volume Loop:  Normal Flow Volume Loop  Diffusion:  Abnormal Diffusion Capacity reduced to 62 % predicted  Impression:  Reduced diffusion capacity indicating a decrease in alveolar surface area for gas exchange, Isolated reduction of Residual Volume and Functional Residual Capacity    Imaging:  I have personally reviewed the patients radiographs and have reviewed the reports:  CT scan  Chest [facility]: 10/10/2019    1. Interstitial fibrosis with probable UIP CT pattern. Interstitial lung disease identified characterized by diffuse subpleural reticular opacities with slight basilar predominance although somewhat limited in evaluation secondary to motion artifact. No convincing peripheral honeycombing. Traction bronchiectasis identified. Minimal areas of groundglass density. Limited evaluation for air trapping secondary to motion artifact. CT scan of chest- HRCT: 12/7/2016  IMPRESSIONS:  Moderate to marked idiopathic interstitial fibrosis, in the lower zones of both  lungs, described. Mild to moderate idiopathic interstitial fibrosis in the middle and the upper  zones of both lungs, described.   Mild COPD with mild pulmonary emphysema without any obvious bulla or bleb  formation. In the lower lobes of both lung there are findings indicating mild  bronchiectasis with mild peribronchial thickenings, without retained fluid and  without definable mucous plugs. Focal fibrotic density versus nodule in right middle lobe and apical region of  left upper lobe. Any small pulmonary nodule in lungs may be obscured by  significant interstitial fibrosis. No evidence of pathologic lymphadenopathy or mass in mediastinum or at pulmonary  hilum in either side. Evaluation of focal densities in lungs with follow CT scan, in one year, is  recommended. Barium swallow: 12/7/2016;  Esophageal dysmotility. Small sliding hiatal hernia. Gastroesophageal reflux. Single episode of silent tracheal aspiration of a trace amount of barium  contrast. Recommend consideration of modified barium swallow with speech therapy  for further evaluation. 10/20/2016  Mild interstitial fibrosis in lower lungs bilaterally. Probable mild bronchitis. No evidence of confluent pneumonia, pleural effusion or pneumothorax.   Findings suggestive of mild COPD. No significant interval change. IMPRESSION:   ·  ILD, Pulm fibrosis related to GERD, Aspiration, ? Asbestosis fibrosis noted - Interstitial fibrosis with probable UIP CT pattern. Interstitial lung disease identified characterized by diffuse subpleural reticular opacities with slight basilar predominance although somewhat limited in evaluation secondary to motion artifact. No convincing peripheral honeycombing. Traction bronchiectasis identified. Minimal areas of groundglass density. Limited evaluation for air trapping secondary to motion artifact. PFTs with minimal progression  · Cough-Bronchiectasis and chronic sinusitis ( allergic). Symptomatic improvement noted with addition of Spiriva Respimat  · Chronic rhino sinusitis - perineal and likely trigger for chronic cough   · Multiple myeloma on treatment  · Meinere's disease      RECOMMENDATIONS:   · Discussed with patient: will focus treatment on inciting causes- GERD/aspiration and treat chronic bronchitis  · Less inclined to consider Esbriet ( typical benefit is for SOB symptoms)-given her age and comorbid diagnosis of multiple myeloma will not start treatment at this point  · Continue inhaled Spiriva- respimet   · GERD precautions and treatment to continue  · PPI-daily Prilosec recommended  · We will check high-resolution CT of the chest,Pulmonary function testing,6-minute walk test as clinically indicated  · Speech therapy follow up if cough persists  · Dietary instructions  · Treatment of myeloma per oncology  · Preventive vaccinations-influenza vaccination administered today  · Will follow up in 3 months       Health maintenance screens deferred to Primary care provider.      Ashley Gallo MD

## 2019-10-25 LAB — RHEUMATOID FACTOR QUANT, IMMUNOTURBIDIMETRIC: <20 IU/ML (ref 0–20)

## 2019-10-28 LAB
ENA SS-A AB SER-ACNC: <0.2 AI
ENA SS-B AB SER-ACNC: <0.2 AI
SCLERODERMA AB (SCL-70), 601116: <0.2 AI

## 2019-10-29 LAB
ACE,ACE: 25 U/L (ref 14–82)
ATYPICAL PANCA: NORMAL TITER
CYTOPLASMIC (C-ANCA), 162400: NORMAL TITER
PERINULCEAR (P-ANCA), 13235: NORMAL TITER

## 2019-10-31 ENCOUNTER — OFFICE VISIT (OUTPATIENT)
Dept: FAMILY MEDICINE CLINIC | Facility: CLINIC | Age: 84
End: 2019-10-31

## 2019-10-31 VITALS
DIASTOLIC BLOOD PRESSURE: 58 MMHG | SYSTOLIC BLOOD PRESSURE: 122 MMHG | HEART RATE: 84 BPM | BODY MASS INDEX: 23.49 KG/M2 | HEIGHT: 65 IN | RESPIRATION RATE: 14 BRPM | WEIGHT: 141 LBS | OXYGEN SATURATION: 93 % | TEMPERATURE: 98.4 F

## 2019-10-31 DIAGNOSIS — C90.00 MULTIPLE MYELOMA NOT HAVING ACHIEVED REMISSION (HCC): ICD-10-CM

## 2019-10-31 DIAGNOSIS — J47.9 BRONCHIECTASIS WITHOUT ACUTE EXACERBATION (HCC): Primary | ICD-10-CM

## 2019-10-31 RX ORDER — OMEPRAZOLE 10 MG/1
10 CAPSULE, DELAYED RELEASE ORAL
COMMUNITY

## 2019-10-31 NOTE — PROGRESS NOTES
The patient presents to the office today with the chief complaint of cough    HPI    The patient has bronchiectasis with interstitial scarring. She has a chronic cough which is bothersome. The patient remains on Spiriva with a fair response. The patient has mild dyspnea. The patient has multiple myeloma. She is currently on Revimid. Review of Systems   Respiratory: Positive for cough and shortness of breath (mild dyspnea). Cardiovascular: Negative for chest pain and leg swelling. Allergies   Allergen Reactions    Penicillin V Potassium Diarrhea     States not allergic    Shellfish Derived Swelling       Current Outpatient Medications   Medication Sig Dispense Refill    omeprazole (PRILOSEC) 10 mg capsule Take 10 mg by mouth daily.  varicella-zoster recombinant, PF, (SHINGRIX, PF,) 50 mcg/0.5 mL susr injection 0.5 ml IM for the first dose. Repeat in 3 months 0.5 mL 1    dexAMETHasone (DECADRON) 4 mg tablet TAKE 10 TABLETS BY MOUTH ONCE A WEEK (40MG A WEEK)  5    hydroCHLOROthiazide (MICROZIDE) 12.5 mg capsule TAKE 1 CAPSULE BY MOUTH ONCE DAILY IN THE MORNING 90 Cap 0    NINLARO 3 mg cap Takes 1 a week for 3 weeks then off a week      tiotropium bromide (SPIRIVA RESPIMAT) 2.5 mcg/actuation inhaler Take 2 Puffs by inhalation daily.  potassium chloride (KLOR-CON) 10 mEq tablet 1 tab twice per day 60 Tab 3    HYDROcodone-acetaminophen (NORCO) 5-325 mg per tablet Take 1 Tab by mouth every four (4) hours as needed for Pain.  aspirin delayed-release 81 mg tablet Take 81 mg by mouth daily.  acetaminophen (TYLENOL) 325 mg tablet Take  by mouth every four (4) hours as needed for Pain.  sertraline (ZOLOFT) 50 mg tablet TAKE ONE TABLET BY MOUTH ONCE DAILY (Patient taking differently: Take 50 mg by mouth daily as needed.) 30 Tab 0    cyanocobalamin 1,000 mcg tablet Take 1,000 mcg by mouth daily.       diazepam (VALIUM) 5 mg tablet Take 5 mg by mouth every six (6) hours as needed for Anxiety.          Past Medical History:   Diagnosis Date    Acute bronchitis     Acute upper respiratory infections of unspecified site     Cough     Essential hypertension, benign     Interstitial lung disease (HCC)     Meniere's disease, unspecified     Muscle pain     Pain in limb        Past Surgical History:   Procedure Laterality Date    HX HYSTERECTOMY      HX MOHS PROCEDURES Left        Social History     Socioeconomic History    Marital status:      Spouse name: Not on file    Number of children: Not on file    Years of education: Not on file    Highest education level: Not on file   Occupational History    Not on file   Social Needs    Financial resource strain: Not on file    Food insecurity:     Worry: Not on file     Inability: Not on file    Transportation needs:     Medical: Not on file     Non-medical: Not on file   Tobacco Use    Smoking status: Never Smoker    Smokeless tobacco: Never Used   Substance and Sexual Activity    Alcohol use: No    Drug use: No    Sexual activity: Not Currently   Lifestyle    Physical activity:     Days per week: Not on file     Minutes per session: Not on file    Stress: Not on file   Relationships    Social connections:     Talks on phone: Not on file     Gets together: Not on file     Attends Judaism service: Not on file     Active member of club or organization: Not on file     Attends meetings of clubs or organizations: Not on file     Relationship status: Not on file    Intimate partner violence:     Fear of current or ex partner: Not on file     Emotionally abused: Not on file     Physically abused: Not on file     Forced sexual activity: Not on file   Other Topics Concern    Not on file   Social History Narrative    Not on file       Patient does have an advanced directive on file    Visit Vitals  /58 (BP 1 Location: Left arm, BP Patient Position: Sitting)   Pulse 84   Temp 98.4 °F (36.9 °C) (Tympanic)   Resp 14   Ht 5' 5\" (1.651 m)   Wt 141 lb (64 kg)   SpO2 93%   BMI 23.46 kg/m²       Physical Exam  No Cervical Lymphadenopathy  No Supraclavicular Lymphadenopathy  Thyroid is Normal  Lungs are normal to percussion. Clear to auscultation   Heart:  S1 S2 are normal, No gallops, No murmurs  No Carotid Bruits  Abdomen:  Normal Bowel Sounds. No tenderness. No masses. No Hepatomegaly or Splenomegaly  LE:  Strong Pedal Pulses. No Edema      BMI:  OK    Office Visit on 10/24/2019   Component Date Value Ref Range Status    Sed rate (ESR) 10/24/2019 102* 0 - 30 mm/hr Final    C-Reactive Protein, Qt 10/24/2019 1.8* 0.0 - 0.5 mg/dL Final    Scleroderma Ab (SCL-70) 10/24/2019 <0.2  <1.0 AI Final    Angiotensin Converting Enzyme (ACE) 10/24/2019 25  14 - 82 U/L Final    Comment: Performed at:  [address]  [id number]  : Jeramy Walls MD, Phone:  [phone]     [address] (P-ANCA) 10/24/2019 <1:20  Neg:<1:20 titer Final    Comment: The presence of positive fluorescence exhibiting P-ANCA or C-ANCA  patterns alone is not specific for the diagnosis of Wegener's  Granulomatosis (WG) or microscopic polyangiitis. Decisions about  treatment should not be based solely on ANCA IFA results. The  International ANCA Group Consensus recommends follow up testing of  positive sera with both ME-3 and MPO-ANCA enzyme immunoassays. As  many as 5% serum samples are positive only by EIA. Ref. AM J Clin Pathol 1999;111:507-513.  Atypical pANCA 10/24/2019 <1:20  Neg:<1:20 titer Final    Comment: The atypical pANCA pattern has been observed in a significant  percentage of patients with ulcerative colitis, primary sclerosing  cholangitis and autoimmune hepatitis.  Cytoplasmic (C-ANCA) Ab 10/24/2019 <1:20  Neg:<1:20 titer Final    Comment: Test includes C-ANCA and P-ANCA.   Performed at:  [address]  [id number]  : Alie Gates Nicholas Daniels MD, Phone:  [phone]      RHEUMATOID FACTOR QUANT, IMMUNOTUR* 10/24/2019 <20  0 - 20 IU/mL Final    Sjogren's Anti-SS-A 10/24/2019 <0.2  <1.0 AI Final    Sjogren's Anti-SS-B 10/24/2019 <0.2  <1.0 AI Final    POSITIVE:   =>1 AI    Creatine Kinase,Total 10/24/2019 32  30 - 165 U/L Final   Office Visit on 10/07/2019   Component Date Value Ref Range Status    Glucose 10/07/2019 93  70 - 99 mg/dL Final    BUN 10/07/2019 28* 6 - 22 mg/dL Final    Creatinine 10/07/2019 0.9  0.8 - 1.4 mg/dL Final    Sodium 10/07/2019 141  133 - 145 mmol/L Final    Potassium 10/07/2019 4.0  3.5 - 5.5 mmol/L Final    Chloride 10/07/2019 101  98 - 110 mmol/L Final    CO2 10/07/2019 24  20 - 32 mmol/L Final    AST (SGOT) 10/07/2019 17  10 - 37 U/L Final    ALT (SGPT) 10/07/2019 15  5 - 40 U/L Final    Alk. phosphatase 10/07/2019 72  40 - 120 U/L Final    Bilirubin, total 10/07/2019 0.3  0.2 - 1.2 mg/dL Final    Calcium 10/07/2019 9.7  8.4 - 10.5 mg/dL Final    Protein, total 10/07/2019 6.8  6.2 - 8.1 g/dL Final    Albumin 10/07/2019 3.6  3.5 - 5.0 g/dL Final    A-G Ratio 10/07/2019 1.1  1.1 - 2.6 ratio Final    Globulin 10/07/2019 3.2  2.0 - 4.0 g/dL Final    Anion gap 10/07/2019 15.7  mmol/L Final    Comment: Test includes Albumin, Alkaline Phosphatase, ALT, AST, BUN, Calcium, CO2,  Chloride, Creatinine, Glucose, Potassium, Sodium, Total Bilirubin and Total  Protein. Estimated GFR results are reported in mL/min/1.73 sq.m. by the MDRD equation. This eGFR is validated for stable chronic renal failure patients. This   equation  is unreliable in acute illness or patients with normal renal function.       GFRAA 10/07/2019 >60.0  >60.0 Final    GFRNA 10/07/2019 55.5* >60.0 Final   Hospital Outpatient Visit on 08/15/2019   Component Date Value Ref Range Status    WBC 08/15/2019 4.0* 4.6 - 13.2 K/uL Final    RBC 08/15/2019 3.42* 4.20 - 5.30 M/uL Final    HGB 08/15/2019 10.1* 12.0 - 16.0 g/dL Final    HCT 08/15/2019 31.3* 35.0 - 45.0 % Final    MCV 08/15/2019 91.5  74.0 - 97.0 FL Final    MCH 08/15/2019 29.5  24.0 - 34.0 PG Final    MCHC 08/15/2019 32.3  31.0 - 37.0 g/dL Final    RDW 08/15/2019 17.4* 11.6 - 14.5 % Final    PLATELET 54/73/3380 81* 135 - 420 K/uL Final    NEUTROPHILS 08/15/2019 65  40 - 73 % Final    LYMPHOCYTES 08/15/2019 18* 21 - 52 % Final    MONOCYTES 08/15/2019 16* 3 - 10 % Final    EOSINOPHILS 08/15/2019 1  0 - 5 % Final    BASOPHILS 08/15/2019 0  0 - 2 % Final    ABS. NEUTROPHILS 08/15/2019 2.6  1.8 - 8.0 K/UL Final    ABS. LYMPHOCYTES 08/15/2019 0.7* 0.9 - 3.6 K/UL Final    ABS. MONOCYTES 08/15/2019 0.6  0.05 - 1.2 K/UL Final    ABS. EOSINOPHILS 08/15/2019 0.1  0.0 - 0.4 K/UL Final    ABS. BASOPHILS 08/15/2019 0.0  0.0 - 0.1 K/UL Final    DF 08/15/2019 AUTOMATED    Final    Sodium 08/15/2019 132* 136 - 145 mmol/L Final    Potassium 08/15/2019 2.8* 3.5 - 5.5 mmol/L Final    Comment: Unsuccessful attempt to notify of critical values. Referred to client services for call to physician in a.m. CALLED TO AND CORRECTLY REPEATED BY:  [facility] NURSE [phone] ON [id number] TO B      Chloride 08/15/2019 96* 100 - 111 mmol/L Final    PLEASE NOTE NEW REFERENCE RANGE    CO2 08/15/2019 26  21 - 32 mmol/L Final    Anion gap 08/15/2019 10  3.0 - 18 mmol/L Final    Glucose 08/15/2019 100* 74 - 99 mg/dL Final    BUN 08/15/2019 15  7.0 - 18 MG/DL Final    Creatinine 08/15/2019 1.05  0.6 - 1.3 MG/DL Final    BUN/Creatinine ratio 08/15/2019 14  12 - 20   Final    GFR est AA 08/15/2019 60* >60 ml/min/1.73m2 Final    GFR est non-AA 08/15/2019 49* >60 ml/min/1.73m2 Final    Comment: (NOTE)  Estimated GFR is calculated using the Modification of Diet in Renal   Disease (MDRD) Study equation, reported for both  Americans   (GFRAA) and non- Americans (GFRNA), and normalized to 1.73m2   body surface area. The physician must decide which value applies to   the patient.  The MDRD study equation should only be used in   individuals age 25 or older. It has not been validated for the   following: pregnant women, patients with serious comorbid conditions,   or on certain medications, or persons with extremes of body size,   muscle mass, or nutritional status.  Calcium 08/15/2019 8.4* 8.5 - 10.1 MG/DL Final    Bilirubin, total 08/15/2019 1.4* 0.2 - 1.0 MG/DL Final    ALT (SGPT) 08/15/2019 32  13 - 56 U/L Final    AST (SGOT) 08/15/2019 26  10 - 38 U/L Final    PLEASE NOTE NEW REFERENCE RANGE    Alk. phosphatase 08/15/2019 76  45 - 117 U/L Final    Protein, total 08/15/2019 6.6  6.4 - 8.2 g/dL Final    Albumin 08/15/2019 3.3* 3.4 - 5.0 g/dL Final    Globulin 08/15/2019 3.3  2.0 - 4.0 g/dL Final    A-G Ratio 08/15/2019 1.0  0.8 - 1.7   Final       .No results found for any visits on 10/31/19. Assessment / Plan      ICD-10-CM ICD-9-CM    1. Bronchiectasis without acute exacerbation (HCC) J47.9 494.0    2. Multiple myeloma not having achieved remission ([facility] C90.00 203.00        she was advised to continue her maintenance medications  Advised the patient that Prednisone may be needed        I asked Antonio Becker if she has any questions and I answered the questions. Keon Becker states that she understands the treatment plan and agrees with the treatment plan          THIS DOCUMENT WAS CREATED WITH A VOICE ACTIVATED DICTATION SYSTEM.   IT MAY CONTAIN TRANSCRIPTION ERRORS

## 2019-10-31 NOTE — PROGRESS NOTES
Carmen Carter presents today for   Chief Complaint   Patient presents with    Well Woman       Carmen Carter preferred language for health care discussion is [address] Is someone accompanying this pt? Yes daughter    Is the patient using any DME equipment during OV? Yes rolling walker    Depression Screening:  3 most recent PHQ Screens 10/24/2019   Little interest or pleasure in doing things More than half the days   Feeling down, depressed, irritable, or hopeless More than half the days   Total Score PHQ 2 4   Trouble falling or staying asleep, or sleeping too much -   Feeling tired or having little energy -   Poor appetite, weight loss, or overeating -   Feeling bad about yourself - or that you are a failure or have let yourself or your family down -   Trouble concentrating on things such as school, work, reading, or watching TV -   Moving or speaking so slowly that other people could have noticed; or the opposite being so fidgety that others notice -   Thoughts of being better off dead, or hurting yourself in some way -   PHQ 9 Score -   How difficult have these problems made it for you to do your work, take care of your home and get along with others -       Learning Assessment:  Learning Assessment 1/8/2016   PRIMARY LEARNER Patient   HIGHEST LEVEL OF EDUCATION - PRIMARY LEARNER  GRADUATED HIGH SCHOOL OR GED   BARRIERS PRIMARY LEARNER NONE   CO-LEARNER CAREGIVER No   PRIMARY LANGUAGE ENGLISH    NEED No   LEARNER PREFERENCE PRIMARY VIDEOS     DEMONSTRATION     READING     PICTURES   ANSWERED BY patient   RELATIONSHIP SELF       Abuse Screening:  Abuse Screening Questionnaire 9/4/2019   Do you ever feel afraid of your partner? N   Are you in a relationship with someone who physically or mentally threatens you? N   Is it safe for you to go home? Y       Fall Risk  Fall Risk Assessment, last 12 mths 10/24/2019   Able to walk? Yes   Fall in past 12 months? Yes   Fall with injury?  Yes Number of falls in past 12 months 1   Fall Risk Score 2       Health Maintenance reviewed and discussed and ordered per Provider. Health Maintenance Due   Topic Date Due    Shingrix Vaccine Age 49> (1 of 2) 07/23/1981    Bone Densitometry (Dexa) Screening  07/23/1996    Pneumococcal 65+ years (2 of 2 - PCV13) 09/19/2012    DTaP/Tdap/Td series (1 - Tdap) 08/17/2018    BREAST CANCER SCRN MAMMOGRAM  08/24/2018   . Eva Cazares is updated on all     Pt currently taking Antiplatelet therapy? no    Coordination of Care:  1. Have you been to the ER, urgent care clinic since your last visit? no Hospitalized since your last visit? no    2. Have you seen or consulted any other health care providers outside of the [address] [place] [address] since your last visit? n0 Include any pap smears or colon screening.  no

## 2019-11-01 ENCOUNTER — OFFICE VISIT (OUTPATIENT)
Dept: ORTHOPEDIC SURGERY | Age: 84
End: 2019-11-01

## 2019-11-01 VITALS
DIASTOLIC BLOOD PRESSURE: 81 MMHG | BODY MASS INDEX: 23.49 KG/M2 | WEIGHT: 141 LBS | HEART RATE: 76 BPM | TEMPERATURE: 96.5 F | OXYGEN SATURATION: 97 % | SYSTOLIC BLOOD PRESSURE: 119 MMHG | RESPIRATION RATE: 12 BRPM | HEIGHT: 65 IN

## 2019-11-01 DIAGNOSIS — M19.012 PRIMARY OSTEOARTHRITIS, LEFT SHOULDER: ICD-10-CM

## 2019-11-01 DIAGNOSIS — M25.551 RIGHT HIP PAIN: ICD-10-CM

## 2019-11-01 DIAGNOSIS — M70.61 TROCHANTERIC BURSITIS, RIGHT HIP: Primary | ICD-10-CM

## 2019-11-01 DIAGNOSIS — M16.11 PRIMARY OSTEOARTHRITIS OF RIGHT HIP: ICD-10-CM

## 2019-11-01 RX ORDER — BETAMETHASONE SODIUM PHOSPHATE AND BETAMETHASONE ACETATE 3; 3 MG/ML; MG/ML
6 INJECTION, SUSPENSION INTRA-ARTICULAR; INTRALESIONAL; INTRAMUSCULAR; SOFT TISSUE ONCE
Qty: 1 ML | Refills: 0
Start: 2019-11-01 | End: 2019-11-01

## 2019-11-01 NOTE — PROGRESS NOTES
Patient: Alysha Luque                MRN: [id number]       SSN: [id number]  YOB: 1931        AGE: 80 y.o. SEX: female  Body mass index is 23.46 kg/m². PCP: Suze Diaz MD  11/01/19    HISTORY:  It has been a while since we have seen the patient. As you know, she is 88-years-young and quite feisty. She is complaining of left shoulder and right hip pain. She recently had an injection by Dr. Manju Machado just about 3-4 weeks ago, and she has quite severe shoulder arthritis and a chronic rotator cuff tear on that side. She reports pain in the left shoulder and also moderate pain in the right hip, laterally based. Not too much groin pain. No recent trauma. She does get short of breath on exertion. She denies fevers or chills. Otherwise she has been feeling well. PHYSICAL EXAMINATION:  On examination, she has very restrictive motion involving the left shoulder, only about 10-15 degrees of forward elevation and _____________  is quite diminished as well. I can feel bone-on-bone with rotation of the shoulder. With regard to the hip, the hip rotates nicely with only a touch of stiffness with internal rotation. She is tender over the greater trochanter. The low back is only mildly tender. Mild evidence of neuropathy distally. Both feet are arm and well perfused. RADIOGRAPHS:   Review of the x-rays of her AP pelvis and AP and lateral of the hips show mild arthritis and significant osteoporosis as well. PROCEDURE:  Under aseptic conditions, after informed written consent with timeout, the right trochanteric bursa injected with 1 mL Celestone preparation, 6 mg, well tolerated. PLAN:  I will have her return to see me in 10-14 days and I am going to repeat the injection of the left shoulder for her at that point in time.       CC:  German Abraham MD                 REVIEW OF SYSTEMS:      CON: negative for weight loss, fever  EYE: negative for double vision  ENT: negative for hoarseness  RS:   negative for Tb  GI:    negative for blood in stool  :  negative for blood in urine  Other systems reviewed and noted below. Past Medical History:   Diagnosis Date    Acute bronchitis     Acute upper respiratory infections of unspecified site     Cough     Essential hypertension, benign     Interstitial lung disease (HCC)     Meniere's disease, unspecified     Muscle pain     Pain in limb        Family History   Problem Relation Age of Onset    Cancer Father         LUNG       Current Outpatient Medications   Medication Sig Dispense Refill    omeprazole (PRILOSEC) 10 mg capsule Take 10 mg by mouth daily.  varicella-zoster recombinant, PF, (SHINGRIX, PF,) 50 mcg/0.5 mL susr injection 0.5 ml IM for the first dose. Repeat in 3 months 0.5 mL 1    dexAMETHasone (DECADRON) 4 mg tablet TAKE 10 TABLETS BY MOUTH ONCE A WEEK (40MG A WEEK)  5    hydroCHLOROthiazide (MICROZIDE) 12.5 mg capsule TAKE 1 CAPSULE BY MOUTH ONCE DAILY IN THE MORNING 90 Cap 0    NINLARO 3 mg cap Takes 1 a week for 3 weeks then off a week      tiotropium bromide (SPIRIVA RESPIMAT) 2.5 mcg/actuation inhaler Take 2 Puffs by inhalation daily.  potassium chloride (KLOR-CON) 10 mEq tablet 1 tab twice per day 60 Tab 3    HYDROcodone-acetaminophen (NORCO) 5-325 mg per tablet Take 1 Tab by mouth every four (4) hours as needed for Pain.  aspirin delayed-release 81 mg tablet Take 81 mg by mouth daily.  acetaminophen (TYLENOL) 325 mg tablet Take  by mouth every four (4) hours as needed for Pain.  sertraline (ZOLOFT) 50 mg tablet TAKE ONE TABLET BY MOUTH ONCE DAILY (Patient taking differently: Take 50 mg by mouth daily as needed.) 30 Tab 0    cyanocobalamin 1,000 mcg tablet Take 1,000 mcg by mouth daily.  diazepam (VALIUM) 5 mg tablet Take 5 mg by mouth every six (6) hours as needed for Anxiety.          Allergies   Allergen Reactions    Penicillin V Potassium Diarrhea     States not allergic    Shellfish Derived Swelling       Past Surgical History:   Procedure Laterality Date    HX HYSTERECTOMY      HX MOHS PROCEDURES Left        Social History     Socioeconomic History    Marital status:      Spouse name: Not on file    Number of children: Not on file    Years of education: Not on file    Highest education level: Not on file   Occupational History    Not on file   Social Needs    Financial resource strain: Not on file    Food insecurity:     Worry: Not on file     Inability: Not on file    Transportation needs:     Medical: Not on file     Non-medical: Not on file   Tobacco Use    Smoking status: Never Smoker    Smokeless tobacco: Never Used   Substance and Sexual Activity    Alcohol use: No    Drug use: No    Sexual activity: Not Currently   Lifestyle    Physical activity:     Days per week: Not on file     Minutes per session: Not on file    Stress: Not on file   Relationships    Social connections:     Talks on phone: Not on file     Gets together: Not on file     Attends Yarsani service: Not on file     Active member of club or organization: Not on file     Attends meetings of clubs or organizations: Not on file     Relationship status: Not on file    Intimate partner violence:     Fear of current or ex partner: Not on file     Emotionally abused: Not on file     Physically abused: Not on file     Forced sexual activity: Not on file   Other Topics Concern    Not on file   Social History Narrative    Not on file       Visit Vitals  /81   Pulse 76   Temp 96.5 °F (35.8 °C) (Oral)   Resp 12   Ht 5' 5\" (1.651 m)   Wt 141 lb (64 kg)   SpO2 97%   BMI 23.46 kg/m²         PHYSICAL EXAMINATION:  GENERAL: Alert and oriented x3, in no acute distress, well-developed, well-nourished, afebrile. HEART: No JVD.   EYES: No scleral icterus   NECK: No significant lymphadenopathy   LUNGS: No respiratory compromise or indrawing  ABDOMEN: Soft, non-tender, non-distended. Electronically signed by:  Leia Osorio MD

## 2019-11-01 NOTE — PROGRESS NOTES
1. Have you been to the ER, urgent care clinic since your last visit? Hospitalized since your last visit? No    2. Have you seen or consulted any other health care providers outside of the [address] [place] [address] since your last visit? Include any pap smears or colon screening.  No

## 2019-12-05 ENCOUNTER — OFFICE VISIT (OUTPATIENT)
Dept: ORTHOPEDIC SURGERY | Age: 84
End: 2019-12-05

## 2019-12-05 VITALS
RESPIRATION RATE: 15 BRPM | HEART RATE: 74 BPM | BODY MASS INDEX: 23.43 KG/M2 | OXYGEN SATURATION: 98 % | TEMPERATURE: 97 F | HEIGHT: 65 IN | DIASTOLIC BLOOD PRESSURE: 74 MMHG | WEIGHT: 140.6 LBS | SYSTOLIC BLOOD PRESSURE: 162 MMHG

## 2019-12-05 DIAGNOSIS — M19.012 PRIMARY OSTEOARTHRITIS, LEFT SHOULDER: Primary | ICD-10-CM

## 2019-12-05 RX ORDER — BETAMETHASONE SODIUM PHOSPHATE AND BETAMETHASONE ACETATE 3; 3 MG/ML; MG/ML
6 INJECTION, SUSPENSION INTRA-ARTICULAR; INTRALESIONAL; INTRAMUSCULAR; SOFT TISSUE ONCE
Qty: 1 ML | Refills: 0
Start: 2019-12-05 | End: 2019-12-05

## 2019-12-05 NOTE — PROGRESS NOTES
Patient: Leo Handy                MRN: [id number]       SSN: [id number]  YOB: 1931        AGE: 80 y.o. SEX: female  Body mass index is 23.4 kg/m². PCP: Mega Mo MD  12/05/19    SUBJECTIVE:  I had the pleasure of reevaluating Ms Amy Dunlap for left shoulder pain. AC deltoid type pain. No recent trauma or falls and has otherwise been feeling well. REVIEW OF SYSTEMS:  Denies chest pain or shortness of breath. Has just had some _______________. In fact a 12-point review of systems was performed today with pertinent positives noted. All other systems reviewed and are negative. PHYSICAL EXAMINATION:  She has fairly restricted range of motion in the left shoulder actively. She has about 45-50 degrees forward elevation, abduction is about 45 or 50 degrees, and Hawkin's sign is positive, ER strength is significantly diminished. Some tenderness in the [facility] joint and biceps tender as well. Certainly no evidence for infection or DVT seen today. 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 I think she has had a previous clavicle fracture as a youth which healed uneventfully. OVERALL IMPRESSION:  Chronic rotator cuff tear, impingement, biceps tendinitis, and also bursitis in her shoulder and moderate arthritis as well. PROCEDURE:  Under aseptic conditions and after informed, written consent with time-out, the left shoulder was injected with 1 mL Celestone preparation (6 mg), well tolerated. PLAN:  Return to see us in 3 months' time, sooner if there is any problem. I recommend nonoperative measures for her. CC:   Tania Francis.  Erika Dee MD         REVIEW OF SYSTEMS:      CON: negative for weight loss, fever  EYE: negative for double vision  ENT: negative for hoarseness  RS:   negative for Tb  GI:    negative for blood in stool  :  negative for blood in urine  Other systems reviewed and noted below. Past Medical History:   Diagnosis Date    Acute bronchitis     Acute upper respiratory infections of unspecified site     Cough     Essential hypertension, benign     Interstitial lung disease (HCC)     Meniere's disease, unspecified     Muscle pain     Pain in limb        Family History   Problem Relation Age of Onset    Cancer Father         LUNG       Current Outpatient Medications   Medication Sig Dispense Refill    betamethasone (CELESTONE SOLUSPAN) 6 mg/mL injection 1 mL by Intra artICUlar route once for 1 dose. 1 mL 0    omeprazole (PRILOSEC) 10 mg capsule Take 10 mg by mouth daily.  varicella-zoster recombinant, PF, (SHINGRIX, PF,) 50 mcg/0.5 mL susr injection 0.5 ml IM for the first dose. Repeat in 3 months 0.5 mL 1    dexAMETHasone (DECADRON) 4 mg tablet TAKE 10 TABLETS BY MOUTH ONCE A WEEK (40MG A WEEK)  5    hydroCHLOROthiazide (MICROZIDE) 12.5 mg capsule TAKE 1 CAPSULE BY MOUTH ONCE DAILY IN THE MORNING 90 Cap 0    NINLARO 3 mg cap Takes 1 a week for 3 weeks then off a week      tiotropium bromide (SPIRIVA RESPIMAT) 2.5 mcg/actuation inhaler Take 2 Puffs by inhalation daily.  potassium chloride (KLOR-CON) 10 mEq tablet 1 tab twice per day 60 Tab 3    HYDROcodone-acetaminophen (NORCO) 5-325 mg per tablet Take 1 Tab by mouth every four (4) hours as needed for Pain.  aspirin delayed-release 81 mg tablet Take 81 mg by mouth daily.  acetaminophen (TYLENOL) 325 mg tablet Take  by mouth every four (4) hours as needed for Pain.  sertraline (ZOLOFT) 50 mg tablet TAKE ONE TABLET BY MOUTH ONCE DAILY (Patient taking differently: Take 50 mg by mouth daily as needed.) 30 Tab 0    cyanocobalamin 1,000 mcg tablet Take 1,000 mcg by mouth daily.  diazepam (VALIUM) 5 mg tablet Take 5 mg by mouth every six (6) hours as needed for Anxiety.          Allergies   Allergen Reactions    Penicillin V Potassium Diarrhea States not allergic    Shellfish Derived Swelling       Past Surgical History:   Procedure Laterality Date    HX HYSTERECTOMY      HX MOHS PROCEDURES Left        Social History     Socioeconomic History    Marital status:      Spouse name: Not on file    Number of children: Not on file    Years of education: Not on file    Highest education level: Not on file   Occupational History    Not on file   Social Needs    Financial resource strain: Not on file    Food insecurity:     Worry: Not on file     Inability: Not on file    Transportation needs:     Medical: Not on file     Non-medical: Not on file   Tobacco Use    Smoking status: Never Smoker    Smokeless tobacco: Never Used   Substance and Sexual Activity    Alcohol use: No    Drug use: No    Sexual activity: Not Currently   Lifestyle    Physical activity:     Days per week: Not on file     Minutes per session: Not on file    Stress: Not on file   Relationships    Social connections:     Talks on phone: Not on file     Gets together: Not on file     Attends Buddhism service: Not on file     Active member of club or organization: Not on file     Attends meetings of clubs or organizations: Not on file     Relationship status: Not on file    Intimate partner violence:     Fear of current or ex partner: Not on file     Emotionally abused: Not on file     Physically abused: Not on file     Forced sexual activity: Not on file   Other Topics Concern    Not on file   Social History Narrative    Not on file       Visit Vitals  /74 (BP 1 Location: Right arm, BP Patient Position: Sitting)   Pulse 74   Temp 97 °F (36.1 °C) (Oral)   Resp 15   Ht 5' 5\" (1.651 m)   Wt 140 lb 9.6 oz (63.8 kg)   SpO2 98%   BMI 23.40 kg/m²         PHYSICAL EXAMINATION:  GENERAL: Alert and oriented x3, in no acute distress, well-developed, well-nourished, afebrile. HEART: No JVD.   EYES: No scleral icterus   NECK: No significant lymphadenopathy   LUNGS: No respiratory compromise or indrawing  ABDOMEN: Soft, non-tender, non-distended. Electronically signed by:  Veronica Bartlett MD

## 2019-12-05 NOTE — PROGRESS NOTES
Verbal order with read back given by Teresita Diaz. MD Dion on 12/5/2019 to draw up 1mL (6mg) of Celestone and 3mL of 1% Lidocaine.

## 2019-12-05 NOTE — PROGRESS NOTES
1. Have you been to the ER, urgent care clinic since your last visit? Hospitalized since your last visit? No    2. Have you seen or consulted any other health care providers outside of the [address] [place] [address] since your last visit? Include any pap smears or colon screening.  No

## 2020-02-12 DIAGNOSIS — J84.9 ILD (INTERSTITIAL LUNG DISEASE) (HCC): ICD-10-CM

## 2020-02-12 DIAGNOSIS — R06.02 SOB (SHORTNESS OF BREATH): ICD-10-CM

## 2020-03-05 ENCOUNTER — OFFICE VISIT (OUTPATIENT)
Dept: ORTHOPEDIC SURGERY | Age: 85
End: 2020-03-05

## 2020-03-05 VITALS
BODY MASS INDEX: 23.32 KG/M2 | DIASTOLIC BLOOD PRESSURE: 58 MMHG | HEIGHT: 65 IN | SYSTOLIC BLOOD PRESSURE: 126 MMHG | HEART RATE: 79 BPM | WEIGHT: 140 LBS | OXYGEN SATURATION: 90 %

## 2020-06-03 ENCOUNTER — OFFICE VISIT (OUTPATIENT)
Dept: PULMONOLOGY | Age: 85
End: 2020-06-03

## 2020-06-03 VITALS
DIASTOLIC BLOOD PRESSURE: 40 MMHG | WEIGHT: 138 LBS | SYSTOLIC BLOOD PRESSURE: 120 MMHG | TEMPERATURE: 98.1 F | HEART RATE: 75 BPM | HEIGHT: 65 IN | RESPIRATION RATE: 24 BRPM | OXYGEN SATURATION: 97 % | BODY MASS INDEX: 22.99 KG/M2

## 2020-06-03 DIAGNOSIS — J84.9 ILD (INTERSTITIAL LUNG DISEASE) (HCC): Primary | ICD-10-CM

## 2020-06-03 DIAGNOSIS — R05.3 CHRONIC COUGH: ICD-10-CM

## 2020-06-03 DIAGNOSIS — C90.01 MULTIPLE MYELOMA IN REMISSION (HCC): ICD-10-CM

## 2020-06-03 DIAGNOSIS — R06.02 SOB (SHORTNESS OF BREATH): ICD-10-CM

## 2020-06-03 DIAGNOSIS — J44.9 COPD WITH ASTHMA (HCC): ICD-10-CM

## 2020-06-03 RX ORDER — POMALIDOMIDE 4 MG/1
CAPSULE ORAL
COMMUNITY
Start: 2020-10-06 | End: 2020-10-06

## 2020-06-03 RX ORDER — IPRATROPIUM BROMIDE AND ALBUTEROL SULFATE 2.5; .5 MG/3ML; MG/3ML
3 SOLUTION RESPIRATORY (INHALATION) 2 TIMES DAILY
Qty: 60 NEBULE | Refills: 3 | Status: SHIPPED | OUTPATIENT
Start: 2020-06-03 | End: 2020-10-06 | Stop reason: SDUPTHER

## 2020-06-03 RX ORDER — IPRATROPIUM BROMIDE AND ALBUTEROL SULFATE 2.5; .5 MG/3ML; MG/3ML
3 SOLUTION RESPIRATORY (INHALATION) 2 TIMES DAILY
Qty: 60 NEBULE | Refills: 3 | Status: SHIPPED | OUTPATIENT
Start: 2020-06-03 | End: 2020-06-03 | Stop reason: SDUPTHER

## 2020-06-03 NOTE — LETTER
6/3/2020 11:43 AM 
 
Patient:  Sumanth Jewell YOB: 1931 Date of Visit: 6/3/2020 Dear Jennifer Trujillo MD 
[id number] [address] [address] VIA Facsimile: [phone]: Thank you for referring Ms. Kym Gonzalez to me for evaluation/treatment. Below are the relevant portions of my assessment and plan of care. If you have questions, please do not hesitate to call me. I look forward to following Ms. Shavon Nguyen along with you.  
 
 
 
Sincerely, 
 
 
Kathy Hamlin MD

## 2020-06-03 NOTE — PROGRESS NOTES
[facility]  [address] [facility], [address] [place] [address]  ([place]) [phone]      Simple walk test done in office today. Qualifying O2 sats:     O2 Sat at rest on room air is: 97%, Pulse 68, SOB scale 2/10    Walked: 68   m on Room Air  93 %, Pulse 79, SOB scale 2/10      136  m on Room Air : 92 %, Pulse 80, SOB scale 2/10      204 m on Room Air : 92%, Pulse 84, SOB scale 2/10                 272 m on Room Air:  94%, Pulse 89  SOB scale 4/10                 340 m on room Air  95%, Pulse 89  SOB scale  6/10      Tech comments regarding testing: Pt. Ambulated with Walker at a good pace throughout without any resp. Distress.     Gaby Philippe LPN

## 2020-06-03 NOTE — PROGRESS NOTES
The pt is having SOB and a cough with occ. Prod. Of green sputum for several months. The pt. Is very hard of hearing despite a hearing aid and cochlear implant    Sumeet Sweeney presents today for   Chief Complaint   Patient presents with    Breathing Problem     F/U to 10/24 Labs 10/24    Cough       Is someone accompanying this pt? Maegan Bullock daughter    Is the patient using any DME equipment during this visit? No   -DME Company     Depression Screening:  3 most recent PHQ Screens 10/24/2019   Little interest or pleasure in doing things More than half the days   Feeling down, depressed, irritable, or hopeless More than half the days   Total Score PHQ 2 4   Trouble falling or staying asleep, or sleeping too much -   Feeling tired or having little energy -   Poor appetite, weight loss, or overeating -   Feeling bad about yourself - or that you are a failure or have let yourself or your family down -   Trouble concentrating on things such as school, work, reading, or watching TV -   Moving or speaking so slowly that other people could have noticed; or the opposite being so fidgety that others notice -   Thoughts of being better off dead, or hurting yourself in some way -   PHQ 9 Score -   How difficult have these problems made it for you to do your work, take care of your home and get along with others -       Learning Assessment:  Learning Assessment 1/8/2016   PRIMARY LEARNER Patient   HIGHEST LEVEL OF EDUCATION - PRIMARY LEARNER  GRADUATED HIGH SCHOOL OR GED   BARRIERS PRIMARY LEARNER NONE   CO-LEARNER CAREGIVER No   PRIMARY LANGUAGE ENGLISH    NEED No   LEARNER PREFERENCE PRIMARY VIDEOS     DEMONSTRATION     READING     PICTURES   ANSWERED BY patient   RELATIONSHIP SELF       Abuse Screening:  Abuse Screening Questionnaire 9/4/2019   Do you ever feel afraid of your partner? N   Are you in a relationship with someone who physically or mentally threatens you?  N   Is it safe for you to go home? Y       Fall Risk  Fall Risk Assessment, last 12 mths 3/5/2020   Able to walk? Yes   Fall in past 12 months? No   Fall with injury? -   Number of falls in past 12 months -   Fall Risk Score -         Coordination of Care:  1. Have you been to the ER, urgent care clinic since your last visit? Hospitalized since your last visit? No    2. Have you seen or consulted any other health care providers outside of the [address] [place] [address] since your last visit? Geremias Acuna    Medication variance in dosage/sig per patient as follows: Per Med. Rec.

## 2020-06-03 NOTE — PROGRESS NOTES
[facility]  Pulmonary, Critical Care, and Sleep Medicine      Pulmonary Office visit    Name: Dale Guerin     : 1931     Date: 6/3/2020        Subjective:     Patient is a 80 y.o. female is referred for evaluation of cough. 20     Patient is here with her daughter and states that she has constant symptoms of cough-dry to productive of greenish mucus from time to time and occurring almost daily. In late April she had been experiencing some chest pain and went to the emergency room at [facility] where a CT scan of the abdomen pelvis was done which showed lung fields with bilateral interstitial pulmonary fibrotic changes. It was thought that she had GERD with esophagitis and treated accordingly  She has been diagnosed with multiple myeloma and currently undergoing treatment with Revlimid. In late spring she started with coughing again which seem to persist despite prednisone taper and antibiotics and was more recently seen by Sylvie Carrero nurse practitioner in our clinic and was prescribed Spiriva Respimat which she has not been using regularly  Mentions some increase in mucus with the change of season  She denies any hemoptysis. Denies any fever or chills  In the past she was diagnosed with interstitial lung disease with symptoms of chronic persistent cough which had improved with treatment and speech therapy recommendations. Has had some episodes of cough- following with speech therapy for safe swallowing therapy. Modified barium swallow recommended. Denies fever, chills. Has had some weight loss, minimal weakness but no significant effect on activities of daily living      HPI:  She describes chronic cough all year around with worsening in winter. Cough starts with bronchitis and persists cough productive of milky- yellow mucus and sometimes difficulty expectoration. C/o stuffy nose and post nasal drip.denies headache. Uses nasal sprays- Flonase.   Takes allegra for ears congestion. Not  On any inhalers. Denies wheezing. Denies any exposure to allergens per se but daughter has indoor dog. Denies Pneumonia. Occasional acid reflux. C/o arthritic joint pain  Denies pedal edema, PND, orthopnea  Non smoker and no industrial exposure. Spouse worked at [employer] for 30 + years- Planner and  for most of his career but had asbestos exposure in early years. Past Medical History:   Diagnosis Date    Acute bronchitis     Acute upper respiratory infections of unspecified site     Asthma with COPD ([facility] [address] 6/3/2020    Cough     Essential hypertension, benign     Interstitial lung disease (HCC)     Meniere's disease, unspecified     Muscle pain     Pain in limb        Past Surgical History:   Procedure Laterality Date    HX HYSTERECTOMY      HX MOHS PROCEDURES Left      Allergies   Allergen Reactions    Penicillin V Potassium Diarrhea     States not allergic    Shellfish Derived Swelling     Current Outpatient Medications   Medication Sig Dispense Refill    pomalidomide (Pomalyst) 4 mg cap Take  by mouth. One daily x 21 days then off 7 days and repeat      OTHER Ninlaro 3 mg Caps. One a day x 3 weeks then hold for 1 week and repeat      OTHER 1 Tab daily. Tumeric      ferrous sulfate (Iron) 325 mg (65 mg iron) cpER Take  by mouth daily.  albuterol-ipratropium (DUO-NEB) 2.5 mg-0.5 mg/3 ml nebu 3 mL by Nebulization route two (2) times a day. 60 Nebule 3    omeprazole (PRILOSEC) 10 mg capsule Take 10 mg by mouth daily.  dexAMETHasone (DECADRON) 4 mg tablet TAKE 10 TABLETS BY MOUTH ONCE A WEEK (40MG A WEEK)  5    hydroCHLOROthiazide (MICROZIDE) 12.5 mg capsule TAKE 1 CAPSULE BY MOUTH ONCE DAILY IN THE MORNING 90 Cap 0    NINLARO 3 mg cap Takes 1 a week for 3 weeks then off a week      potassium chloride (KLOR-CON) 10 mEq tablet 1 tab twice per day 60 Tab 3    aspirin delayed-release 81 mg tablet Take 81 mg by mouth daily.       acetaminophen (TYLENOL) 325 mg tablet Take  by mouth every four (4) hours as needed for Pain.  sertraline (ZOLOFT) 50 mg tablet TAKE ONE TABLET BY MOUTH ONCE DAILY (Patient taking differently: Take 50 mg by mouth daily as needed.) 30 Tab 0    diazepam (VALIUM) 5 mg tablet Take 5 mg by mouth every six (6) hours as needed for Anxiety.  varicella-zoster recombinant, PF, (SHINGRIX, PF,) 50 mcg/0.5 mL susr injection 0.5 ml IM for the first dose. Repeat in 3 months 0.5 mL 1    HYDROcodone-acetaminophen (NORCO) 5-325 mg per tablet Take 1 Tab by mouth every four (4) hours as needed for Pain.  cyanocobalamin 1,000 mcg tablet Take 1,000 mcg by mouth daily.        Review of Systems:  HEENT: No epistaxis, no nasal drainage, no difficulty in swallowing, no redness in eyes  Respiratory: as above  Cardiovascular: no chest pain, no palpitations, no chronic leg edema, no syncope  Gastrointestinal: no abd pain, no vomiting, no diarrhea, no bleeding symptoms  Genitourinary: No urinary symptoms or hematuria  Integument/breast: No ulcers or rashes  Musculoskeletal:Neg  Neurological: No focal weakness, no seizures, no headaches  Behvioral/Psych: No anxiety, no depression  Constitutional: No fever, no chills, no weight loss, no night sweats     Objective:     Visit Vitals  /40 (BP 1 Location: Left arm, BP Patient Position: Sitting)   Pulse 75   Temp 98.1 °F (36.7 °C)   Resp 24   Ht 5' 5\" (1.651 m)   Wt 62.6 kg (138 lb)   SpO2 97%   BMI 22.96 kg/m²        Physical Exam:   General: comfortable, no acute distress  HEENT: pupils reactive, sclera anicteric, EOM intact  Neck: No adenopathy or thyroid swelling, no lymphadenopathy or JVD, supple  CVS: S1S2 no murmurs  RS: Mod AE bilaterally, no tactile fremitus or egophony, no accessory muscle use, bibasilar dry crackles assisted up lung fields no wheezing  Abd: soft, non tender, no hepatosplenomegaly  Neuro: non focal, awake, alert  Extrm: no leg edema, clubbing or cyanosis  Skin: no rash    Data review:   PULMONARY FUNCTION TESTS     Date FVC FEV1  FEV1/FVC ZDF02-05 TLC RV RV/TLC VC DLCO   11/10/16 103% 124% 88 57% 82% 64% 78% 102% 11.33 -62%   10/7/2019  72  86  normal    68  61    74  41/67%                                                  6-minute walk-6/3/2020-no significant oxygen desaturation noted  Walked 204 m without any oxygen desaturation    PFT 11/10/16:  Flows:  Normal flows  Volumes:  Functional Residual Capacity and Residual Volume are reduced  Flow Volume Loop:  Normal Flow Volume Loop  Diffusion:  Abnormal Diffusion Capacity reduced to 62 % predicted  Impression:  Reduced diffusion capacity indicating a decrease in alveolar surface area for gas exchange, Isolated reduction of Residual Volume and Functional Residual Capacity    Imaging:  I have personally reviewed the patients radiographs and have reviewed the reports:  CT scan  Chest [facility]: 10/10/2019    1. Interstitial fibrosis with probable UIP CT pattern. Interstitial lung disease identified characterized by diffuse subpleural reticular opacities with slight basilar predominance although somewhat limited in evaluation secondary to motion artifact. No convincing peripheral honeycombing. Traction bronchiectasis identified. Minimal areas of groundglass density. Limited evaluation for air trapping secondary to motion artifact. CT scan of chest- HRCT: 12/7/2016  IMPRESSIONS:  Moderate to marked idiopathic interstitial fibrosis, in the lower zones of both  lungs, described. Mild to moderate idiopathic interstitial fibrosis in the middle and the upper  zones of both lungs, described.   Mild COPD with mild pulmonary emphysema without any obvious bulla or bleb  formation. In the lower lobes of both lung there are findings indicating mild  bronchiectasis with mild peribronchial thickenings, without retained fluid and  without definable mucous plugs.   Focal fibrotic density versus nodule in right middle lobe and apical region of  left upper lobe. Any small pulmonary nodule in lungs may be obscured by  significant interstitial fibrosis. No evidence of pathologic lymphadenopathy or mass in mediastinum or at pulmonary  hilum in either side. Evaluation of focal densities in lungs with follow CT scan, in one year, is  recommended. Barium swallow: 12/7/2016;  Esophageal dysmotility. Small sliding hiatal hernia. Gastroesophageal reflux. Single episode of silent tracheal aspiration of a trace amount of barium  contrast. Recommend consideration of modified barium swallow with speech therapy  for further evaluation. 10/20/2016  Mild interstitial fibrosis in lower lungs bilaterally. Probable mild bronchitis. No evidence of confluent pneumonia, pleural effusion or pneumothorax. Findings suggestive of mild COPD. No significant interval change. IMPRESSION:   ·  ILD, Pulm fibrosis related to GERD, Aspiration, ? Asbestosis fibrosis noted - Interstitial fibrosis with probable UIP CT pattern. Interstitial lung disease identified characterized by diffuse subpleural reticular opacities with slight basilar predominance although somewhat limited in evaluation secondary to motion artifact. No convincing peripheral honeycombing. Traction bronchiectasis identified. Minimal areas of groundglass density. Limited evaluation for air trapping secondary to motion artifact. PFTs with minimal progression. No oxygen desaturation  · Cough-Bronchiectasis and chronic sinusitis ( allergic). Symptomatic improvement noted with addition of Spiriva Respimat but not able to take and would benefit from nebulized therapy with a component of bronchospasm-asthma COPD.   · Chronic rhino sinusitis - perineal and likely trigger for chronic cough   · Multiple myeloma on treatment  · Meinere's disease      RECOMMENDATIONS:   · Discussed with patient: will focus treatment on inciting causes- GERD/aspiration and treat chronic bronchitis  · Less inclined to consider Esbriet ( typical benefit is for SOB symptoms)-given her age and comorbid diagnosis of multiple myeloma will not start treatment at this point. Discussed with daughter about managing symptom control  · Will discontinue Spiriva Respimat and start patient on nebulized duo nebs scheduled twice daily and as needed if needed-orders placed  · GERD precautions and treatment to continue  · PPI-daily Prilosec recommended  · We will check high-resolution CT of the chest,Pulmonary function testing,6-minute walk test as clinically indicated  · Speech therapy follow up if cough persists  · Dietary instructions  · Treatment of myeloma per oncology  · Will follow up in 3 months       Health maintenance screens deferred to Primary care provider.      Moise Garcia MD

## 2020-06-03 NOTE — TELEPHONE ENCOUNTER
Per [facility], they received duo-neb script, but does not have a diagnosis on it. Please resend with diagnosis.

## 2020-06-19 ENCOUNTER — TELEPHONE (OUTPATIENT)
Dept: PULMONOLOGY | Age: 85
End: 2020-06-19

## 2020-06-19 NOTE — TELEPHONE ENCOUNTER
Daughter states the patient is on DuoNeb. She has been failing lately and the PCP wants to know from Dr. Sushil Swain if the DuoNeb would cause her B/P to drop. The daughter did say it is when she gets up and starts moving without waiting a minute to move.

## 2020-06-19 NOTE — TELEPHONE ENCOUNTER
Pt's daughter called(12-26). Since pt has been using nebulizer with the two solutions, she has been falling a lot. Her pcp told her to call Dr Ivette Street to see if the medications may be effecting her blood pressure and if that could be the cause of her falling. Please check and call her back.

## 2020-06-21 NOTE — TELEPHONE ENCOUNTER
Unlikely effect of duoneb- can stop and see  She has chronic meinnaire- dizzyness  And also on HCTZ- more likely to cause postural hypotension

## 2020-06-22 NOTE — TELEPHONE ENCOUNTER
Spoke with daughter Abhi Khan and advised verbal order from Dr. Namita Maloney.  Daughter verbalizes understanding

## 2020-09-30 PROBLEM — A53.0 POSITIVE RPR TEST: Status: ACTIVE | Noted: 2018-01-25

## 2020-09-30 PROBLEM — F33.40 RECURRENT MAJOR DEPRESSIVE DISORDER, IN REMISSION (HCC): Status: ACTIVE | Noted: 2020-05-11

## 2020-09-30 PROBLEM — K21.9 GASTROESOPHAGEAL REFLUX DISEASE WITHOUT ESOPHAGITIS: Status: ACTIVE | Noted: 2020-05-11

## 2020-09-30 PROBLEM — R77.8 TROPONIN LEVEL ELEVATED: Status: ACTIVE | Noted: 2019-03-14

## 2020-09-30 PROBLEM — J18.9 PNEUMONIA DUE TO ORGANISM: Status: ACTIVE | Noted: 2018-03-10

## 2020-09-30 PROBLEM — D72.829 LEUKOCYTOSIS: Status: ACTIVE | Noted: 2018-03-11

## 2020-09-30 PROBLEM — R73.9 HYPERGLYCEMIA: Status: ACTIVE | Noted: 2018-03-11

## 2020-09-30 PROBLEM — Z00.00 MEDICARE ANNUAL WELLNESS VISIT, SUBSEQUENT: Status: ACTIVE | Noted: 2020-07-28

## 2020-09-30 PROBLEM — R19.02 LEFT UPPER QUADRANT ABDOMINAL MASS: Status: ACTIVE | Noted: 2020-04-28

## 2020-09-30 RX ORDER — FLUTICASONE PROPIONATE 50 MCG
1 SPRAY, SUSPENSION (ML) NASAL
COMMUNITY
Start: 2019-12-23

## 2020-09-30 RX ORDER — DEXTROMETHORPHAN/PSEUDOEPHED 2.5-7.5/.8
DROPS ORAL
COMMUNITY
Start: 2020-10-06 | End: 2020-10-06

## 2020-09-30 RX ORDER — MELPHALAN USP, 2 MG 2 MG/1
2 TABLET ORAL DAILY
COMMUNITY

## 2020-09-30 RX ORDER — CETIRIZINE HCL 10 MG
10 TABLET ORAL
COMMUNITY

## 2020-10-06 ENCOUNTER — OFFICE VISIT (OUTPATIENT)
Dept: PULMONOLOGY | Age: 85
End: 2020-10-06
Payer: MEDICARE

## 2020-10-06 VITALS
WEIGHT: 137.2 LBS | HEIGHT: 65 IN | TEMPERATURE: 98.4 F | BODY MASS INDEX: 22.86 KG/M2 | RESPIRATION RATE: 16 BRPM | OXYGEN SATURATION: 95 % | HEART RATE: 94 BPM | DIASTOLIC BLOOD PRESSURE: 52 MMHG | SYSTOLIC BLOOD PRESSURE: 114 MMHG

## 2020-10-06 DIAGNOSIS — C90.01 MULTIPLE MYELOMA IN REMISSION (HCC): ICD-10-CM

## 2020-10-06 DIAGNOSIS — J84.9 ILD (INTERSTITIAL LUNG DISEASE) (HCC): Primary | ICD-10-CM

## 2020-10-06 DIAGNOSIS — J44.9 COPD WITH ASTHMA (HCC): ICD-10-CM

## 2020-10-06 DIAGNOSIS — Z23 NEEDS FLU SHOT: ICD-10-CM

## 2020-10-06 PROCEDURE — 1100F PTFALLS ASSESS-DOCD GE2>/YR: CPT | Performed by: INTERNAL MEDICINE

## 2020-10-06 PROCEDURE — 99214 OFFICE O/P EST MOD 30 MIN: CPT | Performed by: INTERNAL MEDICINE

## 2020-10-06 PROCEDURE — G9717 DOC PT DX DEP/BP F/U NT REQ: HCPCS | Performed by: INTERNAL MEDICINE

## 2020-10-06 PROCEDURE — G8536 NO DOC ELDER MAL SCRN: HCPCS | Performed by: INTERNAL MEDICINE

## 2020-10-06 PROCEDURE — 3288F FALL RISK ASSESSMENT DOCD: CPT | Performed by: INTERNAL MEDICINE

## 2020-10-06 PROCEDURE — 90694 VACC AIIV4 NO PRSRV 0.5ML IM: CPT | Performed by: INTERNAL MEDICINE

## 2020-10-06 PROCEDURE — G8427 DOCREV CUR MEDS BY ELIG CLIN: HCPCS | Performed by: INTERNAL MEDICINE

## 2020-10-06 PROCEDURE — G8420 CALC BMI NORM PARAMETERS: HCPCS | Performed by: INTERNAL MEDICINE

## 2020-10-06 PROCEDURE — 1090F PRES/ABSN URINE INCON ASSESS: CPT | Performed by: INTERNAL MEDICINE

## 2020-10-06 PROCEDURE — G0008 ADMIN INFLUENZA VIRUS VAC: HCPCS | Performed by: INTERNAL MEDICINE

## 2020-10-06 RX ORDER — BENZONATATE 200 MG/1
200 CAPSULE ORAL
Qty: 60 CAP | Refills: 3 | Status: SHIPPED | OUTPATIENT
Start: 2020-10-06 | End: 2020-10-13

## 2020-10-06 RX ORDER — IPRATROPIUM BROMIDE AND ALBUTEROL SULFATE 2.5; .5 MG/3ML; MG/3ML
3 SOLUTION RESPIRATORY (INHALATION) 2 TIMES DAILY
Qty: 60 NEBULE | Refills: 3 | Status: SHIPPED | OUTPATIENT
Start: 2020-10-06

## 2020-10-06 RX ORDER — PREDNISONE 10 MG/1
TABLET ORAL
COMMUNITY
Start: 2020-08-27 | End: 2020-10-06 | Stop reason: ALTCHOICE

## 2020-10-06 NOTE — PROGRESS NOTES
Gold Joshi presents today for   Chief Complaint   Patient presents with    COPD     follow up from 6/3/2020    Cough    Shortness of Breath    Other     ILD    Results     CXR 7/31/2020 DR NAZANIN RYAN [facility])       Is someone accompanying this pt? Yes. Family member    Is the patient using any DME equipment during [address]? Yes. Walker, [address] N/A    Depression Screening:  3 most recent PHQ Screens 10/6/2020   Little interest or pleasure in doing things More than half the days   Feeling down, depressed, irritable, or hopeless More than half the days   Total Score PHQ 2 4   Trouble falling or staying asleep, or sleeping too much -   Feeling tired or having little energy -   Poor appetite, weight loss, or overeating -   Feeling bad about yourself - or that you are a failure or have let yourself or your family down -   Trouble concentrating on things such as school, work, reading, or watching TV -   Moving or speaking so slowly that other people could have noticed; or the opposite being so fidgety that others notice -   Thoughts of being better off dead, or hurting yourself in some way -   PHQ 9 Score -   How difficult have these problems made it for you to do your work, take care of your home and get along with others -       Learning Assessment:  Learning Assessment 1/8/2016   PRIMARY LEARNER Patient   HIGHEST LEVEL OF EDUCATION - PRIMARY LEARNER  GRADUATED HIGH SCHOOL OR GED   BARRIERS PRIMARY LEARNER NONE   CO-LEARNER CAREGIVER No   PRIMARY LANGUAGE ENGLISH    NEED No   LEARNER PREFERENCE PRIMARY VIDEOS     DEMONSTRATION     READING     PICTURES   ANSWERED BY patient   RELATIONSHIP SELF       Abuse Screening:  Abuse Screening Questionnaire 10/6/2020   Do you ever feel afraid of your partner? N   Are you in a relationship with someone who physically or mentally threatens you? N   Is it safe for you to go home? Y       Fall Risk  Fall Risk Assessment, last 12 mths 10/6/2020   Able to walk? Yes   Fall in past 12 months? Yes   Fall with injury? No   Number of falls in past 12 months 6   Fall Risk Score 6         Coordination of Care:  1. Have you been to the ER, urgent care clinic since your last visit? Hospitalized since your last visit? No    2. Have you seen or consulted any other health care providers outside of the [address] [place] [address] since your last visit? Include any pap smears or colon screening. Yes.  Dr. Richard Velazquez, PCP, Dr. Dae Mancilla, oncologist

## 2020-10-06 NOTE — PATIENT INSTRUCTIONS
Vaccine Information Statement Influenza (Flu) Vaccine (Inactivated or Recombinant): What You Need to Know Many Vaccine Information Statements are available in Estonian and other languages. See www.immunize.org/vis Hojas de información sobre vacunas están disponibles en español y en muchos otros idiomas. Visite www.immunize.org/vis 1. Why get vaccinated? Influenza vaccine can prevent influenza (flu). Flu is a contagious disease that spreads around the [facility] every year, usually between October and May. Anyone can get the flu, but it is more dangerous for some people. Infants and young children, people 72years of age and older, pregnant women, and people with certain health conditions or a weakened immune system are at greatest risk of flu complications. Pneumonia, bronchitis, sinus infections and ear infections are examples of flu-related complications. If you have a medical condition, such as heart disease, cancer or diabetes, flu can make it worse. Flu can cause fever and chills, sore throat, muscle aches, fatigue, cough, headache, and runny or stuffy nose. Some people may have vomiting and diarrhea, though this is more common in children than adults. Each year thousands of people in the [facility] die from flu, and many more are hospitalized. Flu vaccine prevents millions of illnesses and flu-related visits to the doctor each year. 2. Influenza vaccines CDC recommends everyone 10months of age and older get vaccinated every flu season. Children 6 months through 6years of age may need 2 doses during a single flu season. Everyone else needs only 1 dose each flu season. It takes about 2 weeks for protection to develop after vaccination. There are many flu viruses, and they are always changing. Each year a new flu vaccine is made to protect against three or four viruses that are likely to cause disease in the upcoming flu season.  Even when the vaccine doesnt exactly match these viruses, it may still provide some protection. Influenza vaccine does not cause flu. Influenza vaccine may be given at the same time as other vaccines. 3. Talk with your health care provider Tell your vaccine provider if the person getting the vaccine: 
 Has had an allergic reaction after a previous dose of influenza vaccine, or has any severe, life-threatening allergies.  Has ever had Guillain-Barré Syndrome (also called GBS). In some cases, your health care provider may decide to postpone influenza vaccination to a future visit. People with minor illnesses, such as a cold, may be vaccinated. People who are moderately or severely ill should usually wait until they recover before getting influenza vaccine. Your health care provider can give you more information. 4. Risks of a reaction  Soreness, redness, and swelling where shot is given, fever, muscle aches, and headache can happen after influenza vaccine.  There may be a very small increased risk of Guillain-Barré Syndrome (GBS) after inactivated influenza vaccine (the flu shot). Sukumar Felt children who get the flu shot along with pneumococcal vaccine (PCV13), and/or DTaP vaccine at the same time might be slightly more likely to have a seizure caused by fever. Tell your health care provider if a child who is getting flu vaccine has ever had a seizure. People sometimes faint after medical procedures, including vaccination. Tell your provider if you feel dizzy or have vision changes or ringing in the ears. As with any medicine, there is a very remote chance of a vaccine causing a severe allergic reaction, other serious injury, or death. 5. What if there is a serious problem? An allergic reaction could occur after the vaccinated person leaves the clinic.  If you see signs of a severe allergic reaction (hives, swelling of the face and throat, difficulty breathing, a fast heartbeat, dizziness, or weakness), call 9-1-1 and get the person to the nearest hospital. 
 
For other signs that concern you, call your health care provider. Adverse reactions should be reported to the Vaccine Adverse Event Reporting System (VAERS). Your health care provider will usually file this report, or you can do it yourself. Visit the VAERS website at www.vaers. hhs.gov or call 6-262.563.3852. VAERS is only for reporting reactions, and VAERS staff do not give medical advice. 6. The National Vaccine Injury Compensation Program 
 
The [facility] Vaccine Injury Compensation Program (VICP) is a federal program that was created to compensate people who may have been injured by certain vaccines. Visit the VICP website at www.[facility].gov/vaccinecompensation or call 6-343.110.1650 to learn about the program and about filing a claim. There is a time limit to file a claim for compensation. 7. How can I learn more?  Ask your health care provider.  Call your local or state health department.  Contact the Centers for Disease Control and Prevention (CDC): 
- Call 3-921.378.7716 (1-800-CDC-INFO) or 
- Visit CDCs influenza website at www.cdc.gov/flu Vaccine Information Statement (Interim) Inactivated Influenza Vaccine 8/15/2019 
42 BRANDI Rivero [id number]-[id number] Department of Health and Vapotherm Centers for Disease Control and Prevention Office Use Only

## 2020-10-06 NOTE — PROGRESS NOTES
[facility]  Pulmonary, Critical Care, and Sleep Medicine      Pulmonary Office visit    Name: Magaly Linton     : 1931     Date: 10/6/2020        Subjective:     Patient is a 80 y.o. female is referred for evaluation of cough. 10/06/20     Patient is here with her daughter and states that she has constant symptoms of cough-dry . Patient has been homebound since the pandemic started and overall doing well except for the cough. In addition she describes symptoms of fatigue and has needed more blood transfusions due to anemia  In late April she had been experiencing some chest pain and went to the emergency room at [facility] where a CT scan of the abdomen pelvis was done which showed lung fields with bilateral interstitial pulmonary fibrotic changes. It was thought that she had GERD with esophagitis and treated accordingly  She has been diagnosed with multiple myeloma and currently undergoing treatment with Revlimid. She denies any hemoptysis. Denies any fever or chills  In the past she was diagnosed with interstitial lung disease with symptoms of chronic persistent cough which had improved with treatment and speech therapy recommendations. Has had some episodes of cough- following with speech therapy for safe swallowing therapy. Modified barium swallow recommended. Denies fever, chills. Has had some weight loss, minimal weakness but no significant effect on activities of daily living      HPI:  She describes chronic cough all year around with worsening in winter. Cough starts with bronchitis and persists cough productive of milky- yellow mucus and sometimes difficulty expectoration. C/o stuffy nose and post nasal drip.denies headache. Uses nasal sprays- Flonase. Takes allegra for ears congestion. Not  On any inhalers. Denies wheezing. Denies any exposure to allergens per se but daughter has indoor dog. Denies Pneumonia. Occasional acid reflux.   C/o arthritic joint pain  Denies pedal edema, PND, orthopnea  Non smoker and no industrial exposure. Spouse worked at [employer] for 30 + years- Planner and  for most of his career but had asbestos exposure in early years. Past Medical History:   Diagnosis Date    Acute bronchitis     Acute upper respiratory infections of unspecified site     Asthma with COPD ([facility] [address] 6/3/2020    Cough     Essential hypertension, benign     Interstitial lung disease (HCC)     Meniere's disease, unspecified     Muscle pain     Pain in limb        Past Surgical History:   Procedure Laterality Date    HX HYSTERECTOMY      HX MOHS PROCEDURES Left      Allergies   Allergen Reactions    Penicillin V Potassium Diarrhea     States not allergic    Shellfish Derived Swelling     Current Outpatient Medications   Medication Sig Dispense Refill    cetirizine (ZYRTEC) 10 mg tablet Take 10 mg by mouth daily as needed.  fluticasone propionate (FLONASE) 50 mcg/actuation nasal spray 1 [place] by Both Nostrils route daily as needed.  simethicone (MYLICON) 40 HN/3.3 mL drops Take  by mouth every six (6) hours as needed.  melphalan (ALKERAN) 2 mg tablet Take 2 mg by mouth daily. 7 tablets x 4 days with the decadron every 4 weeks      ferrous sulfate (Iron) 325 mg (65 mg iron) cpER Take 1 Tab by mouth daily.  albuterol-ipratropium (DUO-NEB) 2.5 mg-0.5 mg/3 ml nebu 3 mL by Nebulization route two (2) times a day. File under Medicare Part B, ICD code: J44.9, J84.9 60 Nebule 3    omeprazole (PRILOSEC) 10 mg capsule Take 10 mg by mouth daily as needed.  dexAMETHasone (DECADRON) 4 mg tablet Take 40 mg by mouth daily. X 4 days with melphalon every 4 weeks  5    hydroCHLOROthiazide (MICROZIDE) 12.5 mg capsule TAKE 1 CAPSULE BY MOUTH ONCE DAILY IN THE MORNING 90 Cap 0    potassium chloride (KLOR-CON) 10 mEq tablet 1 tab twice per day (Patient taking differently: Take 10 mEq by mouth two (2) times a day.  1 tab twice per day) 60 Tab 3    HYDROcodone-acetaminophen (NORCO) 5-325 mg per tablet Take 1 Tab by mouth every four (4) hours as needed for Pain.  aspirin delayed-release 81 mg tablet Take 81 mg by mouth daily.  acetaminophen (TYLENOL) 325 mg tablet Take  by mouth every four (4) hours as needed for Pain.  sertraline (ZOLOFT) 50 mg tablet TAKE ONE TABLET BY MOUTH ONCE DAILY (Patient taking differently: Take 50 mg by mouth daily as needed.) 30 Tab 0    cyanocobalamin 1,000 mcg tablet Take 1,000 mcg by mouth daily.  diazepam (VALIUM) 5 mg tablet Take 5 mg by mouth every six (6) hours as needed for Anxiety.  iron aspgly,gz-Q-[id number]-FA-Ca-suc (FERREX 150 FORTE PLUS) 942-23-16-0 mg-mg-mcg-mg cap cap Take  by mouth.  pomalidomide (Pomalyst) 4 mg cap Take  by mouth. One daily x 21 days then off 7 days and repeat      OTHER Ninlaro 3 mg Caps. One a day x 3 weeks then hold for 1 week and repeat      OTHER Take 1 Tab by mouth daily.  Tumeric       NINLARO 3 mg cap Takes 1 a week for 3 weeks then off a week       Review of Systems:  HEENT: No epistaxis, no nasal drainage, no difficulty in swallowing, no redness in eyes  Respiratory: as above  Cardiovascular: no chest pain, no palpitations, no chronic leg edema, no syncope  Gastrointestinal: no abd pain, no vomiting, no diarrhea, no bleeding symptoms  Genitourinary: No urinary symptoms or hematuria  Integument/breast: No ulcers or rashes  Musculoskeletal:Neg  Neurological: No focal weakness, no seizures, no headaches  Behvioral/Psych: No anxiety, no depression  Constitutional: No fever, no chills, no weight loss, no night sweats     Objective:     Visit Vitals  BP (!) 114/52 (BP 1 Location: Right arm, BP Patient Position: Sitting)   Pulse 94   Temp 98.4 °F (36.9 °C) (Oral)   Resp 16   Ht 5' 5\" (1.651 m)   Wt 62.2 kg (137 lb 3.2 oz)   SpO2 95%   BMI 22.83 kg/m²        Physical Exam:   General: comfortable, no acute distress  HEENT: pupils reactive, sclera anicteric, EOM intact  Neck: No adenopathy or thyroid swelling, no lymphadenopathy or JVD, supple  CVS: S1S2 no murmurs  RS: Mod AE bilaterally, no tactile fremitus or egophony, no accessory muscle use, bibasilar dry crackles long-term up lung fields no wheezing  Abd: soft, non tender, no hepatosplenomegaly  Neuro: non focal, awake, alert  Extrm: no leg edema, clubbing or cyanosis  Skin: no rash    Data review:   PULMONARY FUNCTION TESTS     Date FVC FEV1  FEV1/FVC [id number] TLC RV RV/TLC VC DLCO   11/10/16 103% 124% 88 57% 82% 64% 78% 102% 11.33 -62%   10/7/2019  72  86  normal    68  61    74  41/67%                                                  6-minute walk-6/3/2020-no significant oxygen desaturation noted  Walked 204 m without any oxygen desaturation    PFT 11/10/16:  Flows:  Normal flows  Volumes:  Functional Residual Capacity and Residual Volume are reduced  Flow Volume Loop:  Normal Flow Volume Loop  Diffusion:  Abnormal Diffusion Capacity reduced to 62 % predicted  Impression:  Reduced diffusion capacity indicating a decrease in alveolar surface area for gas exchange, Isolated reduction of Residual Volume and Functional Residual Capacity    Imaging:  I have personally reviewed the patients radiographs and have reviewed the reports:  CT scan  Chest [facility]: 10/10/2019    1. Interstitial fibrosis with probable UIP CT pattern. Interstitial lung disease identified characterized by diffuse subpleural reticular opacities with slight basilar predominance although somewhat limited in evaluation secondary to motion artifact. No convincing peripheral honeycombing. Traction bronchiectasis identified. Minimal areas of groundglass density. Limited evaluation for air trapping secondary to motion artifact. CT scan of chest- HRCT: 12/7/2016  IMPRESSIONS:  Moderate to marked idiopathic interstitial fibrosis, in the lower zones of both  lungs, described.   Mild to moderate idiopathic interstitial fibrosis in the middle and the upper  zones of both lungs, described.   Mild COPD with mild pulmonary emphysema without any obvious bulla or bleb  formation. In the lower lobes of both lung there are findings indicating mild  bronchiectasis with mild peribronchial thickenings, without retained fluid and  without definable mucous plugs. Focal fibrotic density versus nodule in right middle lobe and apical region of  left upper lobe. Any small pulmonary nodule in lungs may be obscured by  significant interstitial fibrosis. No evidence of pathologic lymphadenopathy or mass in mediastinum or at pulmonary  hilum in either side. Evaluation of focal densities in lungs with follow CT scan, in one year, is  recommended. Barium swallow: 12/7/2016;  Esophageal dysmotility. Small sliding hiatal hernia. Gastroesophageal reflux. Single episode of silent tracheal aspiration of a trace amount of barium  contrast. Recommend consideration of modified barium swallow with speech therapy  for further evaluation. 10/20/2016  Mild interstitial fibrosis in lower lungs bilaterally. Probable mild bronchitis. No evidence of confluent pneumonia, pleural effusion or pneumothorax. Findings suggestive of mild COPD. No significant interval change. IMPRESSION:   ·  ILD, Pulm fibrosis related to GERD, Aspiration, ? Asbestosis fibrosis noted - Interstitial fibrosis with probable UIP CT pattern. Symptomatically stable except cough which is rather bothersome   Interstitial lung disease identified characterized by diffuse subpleural reticular opacities with slight basilar predominance although somewhat limited in evaluation secondary to motion artifact. No convincing peripheral honeycombing. Traction bronchiectasis identified. Minimal areas of groundglass density. Limited evaluation for air trapping secondary to motion artifact. PFTs with minimal progression. No oxygen desaturation  · Cough-Bronchiectasis and chronic sinusitis ( allergic).   Symptomatic improvement noted with addition of Spiriva Respimat but not able to take and would benefit from nebulized therapy with a component of bronchospasm-asthma COPD. · Chronic rhino sinusitis - perineal and likely trigger for chronic cough   · Multiple myeloma on treatment  · Meinere's disease      RECOMMENDATIONS:   · Discussed with patient: will focus treatment on inciting causes- GERD/aspiration and treat chronic bronchitis  · Will also focus on symptom control-prescribe Tessalon Perles for her cough in addition to current treatment  · Less inclined to consider Esbriet ( typical benefit is for SOB symptoms)-given her age and comorbid diagnosis of multiple myeloma will not start treatment at this point. Discussed with daughter about managing symptom control  · Will discontinue Spiriva Respimat and start patient on nebulized duo nebs scheduled twice daily and as needed if needed-orders placed  · GERD precautions and treatment to continue  · PPI-daily Prilosec recommended  · We will check high-resolution CT of the chest,Pulmonary function testing,6-minute walk test as clinically indicated  · Speech therapy follow up if cough persists  · Dietary instructions  · Treatment of myeloma and anemia per oncology  · Influenza vaccination high-dose ordered and administered at patient request  · Will follow up in 4 months       Health maintenance screens deferred to Primary care provider.      Kayce Miller MD

## 2020-10-06 NOTE — PROGRESS NOTES
Hedy Burnett is a 80 y.o. female who presents for routine immunizations. She denies any symptoms , reactions or allergies that would exclude them from being immunized today. Risks and adverse reactions were discussed and the VIS was given to them. All questions were addressed. She was observed for 10 min post injection. There were no reactions observed.     Анна Res, LPN

## 2020-10-30 PROBLEM — Z00.00 MEDICARE ANNUAL WELLNESS VISIT, SUBSEQUENT: Status: RESOLVED | Noted: 2020-07-28 | Resolved: 2020-10-30

## 2021-04-07 ENCOUNTER — OFFICE VISIT (OUTPATIENT)
Dept: PULMONOLOGY | Age: 86
End: 2021-04-07
Payer: MEDICARE

## 2021-04-07 VITALS
RESPIRATION RATE: 18 BRPM | DIASTOLIC BLOOD PRESSURE: 40 MMHG | HEIGHT: 65 IN | HEART RATE: 79 BPM | BODY MASS INDEX: 18.69 KG/M2 | TEMPERATURE: 97.7 F | WEIGHT: 112.2 LBS | SYSTOLIC BLOOD PRESSURE: 106 MMHG | OXYGEN SATURATION: 97 %

## 2021-04-07 DIAGNOSIS — J84.9 ILD (INTERSTITIAL LUNG DISEASE) (HCC): Primary | ICD-10-CM

## 2021-04-07 DIAGNOSIS — J44.9 COPD WITH ASTHMA (HCC): ICD-10-CM

## 2021-04-07 DIAGNOSIS — C90.01 MULTIPLE MYELOMA IN REMISSION (HCC): ICD-10-CM

## 2021-04-07 PROCEDURE — G9717 DOC PT DX DEP/BP F/U NT REQ: HCPCS | Performed by: INTERNAL MEDICINE

## 2021-04-07 PROCEDURE — 99214 OFFICE O/P EST MOD 30 MIN: CPT | Performed by: INTERNAL MEDICINE

## 2021-04-07 PROCEDURE — 1090F PRES/ABSN URINE INCON ASSESS: CPT | Performed by: INTERNAL MEDICINE

## 2021-04-07 PROCEDURE — G8536 NO DOC ELDER MAL SCRN: HCPCS | Performed by: INTERNAL MEDICINE

## 2021-04-07 PROCEDURE — 1100F PTFALLS ASSESS-DOCD GE2>/YR: CPT | Performed by: INTERNAL MEDICINE

## 2021-04-07 PROCEDURE — 3288F FALL RISK ASSESSMENT DOCD: CPT | Performed by: INTERNAL MEDICINE

## 2021-04-07 PROCEDURE — G8427 DOCREV CUR MEDS BY ELIG CLIN: HCPCS | Performed by: INTERNAL MEDICINE

## 2021-04-07 PROCEDURE — G8420 CALC BMI NORM PARAMETERS: HCPCS | Performed by: INTERNAL MEDICINE

## 2021-04-07 NOTE — PROGRESS NOTES
[facility]  Pulmonary, Critical Care, and Sleep Medicine      Pulmonary Office visit    Name: Roman Hsu     : 1931     Date: 2021        Subjective:     Patient is a 80 y.o. female is referred for evaluation of cough. 21   Patient had frequent falls over the past 3 months ultimately leading to fracture of hip which required hospitalization and subsequently transferred to [facility]. rehab. She is now home and continuing to get therapy with speech as well as physical therapy. Per daughter she got hypernatremia which led to the falls. Since discharge she is working with a speech therapist trying to retrain her voice and swallowing-apparent damage from intubation per daughter  Patient is here with her daughter. Interestingly her breathing did not decompensate and her cough actually has improved since she has been using the nebulizer after returning home  In late 2020 she had been experiencing some chest pain and went to the emergency room at [facility] where a CT scan of the abdomen pelvis was done which showed lung fields with bilateral interstitial pulmonary fibrotic changes. It was thought that she had GERD with esophagitis and treated accordingly  She has been diagnosed with multiple myeloma and currently undergoing treatment with Revlimid. She has lost significant weight  She denies any hemoptysis. Denies any fever or chills  In the past she was diagnosed with interstitial lung disease with symptoms of chronic persistent cough which had improved with treatment and speech therapy recommendations. Has had some episodes of cough- following with speech therapy for safe swallowing therapy. Modified barium swallow recommended. Denies fever, chills. She has received 1 dose of Covid vaccine and is due for the second Pfizer dose tomorrow      HPI:  She describes chronic cough all year around with worsening in winter.   Cough starts with bronchitis and persists cough productive of milky- yellow mucus and sometimes difficulty expectoration. C/o stuffy nose and post nasal drip.denies headache. Uses nasal sprays- Flonase. Takes allegra for ears congestion. Not  On any inhalers. Denies wheezing. Denies any exposure to allergens per se but daughter has indoor dog. Denies Pneumonia. Occasional acid reflux. C/o arthritic joint pain  Denies pedal edema, PND, orthopnea  Non smoker and no industrial exposure. Spouse worked at [employer] for 30 + years- Planner and  for most of his career but had asbestos exposure in early years. Past Medical History:   Diagnosis Date    Acute bronchitis     Acute upper respiratory infections of unspecified site     Asthma with COPD ([facility] [address] 6/3/2020    Cough     Essential hypertension, benign     Interstitial lung disease (HCC)     Meniere's disease, unspecified     Muscle pain     Pain in limb        Past Surgical History:   Procedure Laterality Date    HX HYSTERECTOMY      HX MOHS PROCEDURES Left      Allergies   Allergen Reactions    Penicillin V Potassium Diarrhea     States not allergic    Shellfish Derived Swelling     Current Outpatient Medications   Medication Sig Dispense Refill    albuterol-ipratropium (DUO-NEB) 2.5 mg-0.5 mg/3 ml nebu 3 mL by Nebulization route two (2) times a day. File under Medicare Part B, ICD code: J44.9, J84.9 60 Nebule 3    cetirizine (ZYRTEC) 10 mg tablet Take 10 mg by mouth daily as needed.  omeprazole (PRILOSEC) 10 mg capsule Take 10 mg by mouth daily as needed.  dexAMETHasone (DECADRON) 4 mg tablet Take 40 mg by mouth daily. X 4 days with melphalon every 4 weeks  5    hydroCHLOROthiazide (MICROZIDE) 12.5 mg capsule TAKE 1 CAPSULE BY MOUTH ONCE DAILY IN THE MORNING 90 Cap 0    potassium chloride (KLOR-CON) 10 mEq tablet 1 tab twice per day (Patient taking differently: Take 10 mEq by mouth two (2) times a day.  1 tab twice per day) 60 Tab 3    aspirin delayed-release 81 mg tablet Take 81 mg by mouth daily.  acetaminophen (TYLENOL) 325 mg tablet Take  by mouth every four (4) hours as needed for Pain.  sertraline (ZOLOFT) 50 mg tablet TAKE ONE TABLET BY MOUTH ONCE DAILY (Patient taking differently: Take 50 mg by mouth daily as needed.) 30 Tab 0    cyanocobalamin 1,000 mcg tablet Take 1,000 mcg by mouth daily.  diazepam (VALIUM) 5 mg tablet Take 5 mg by mouth every six (6) hours as needed for Anxiety.  fluticasone propionate (FLONASE) 50 mcg/actuation nasal spray 1 [place] by Both Nostrils route daily as needed.  melphalan (ALKERAN) 2 mg tablet Take 2 mg by mouth daily. 7 tablets x 4 days with the decadron every 4 weeks      ferrous sulfate (Iron) 325 mg (65 mg iron) cpER Take 1 Tab by mouth daily.  HYDROcodone-acetaminophen (NORCO) 5-325 mg per tablet Take 1 Tab by mouth every four (4) hours as needed for Pain.        Review of Systems:  HEENT: No epistaxis, no nasal drainage, no difficulty in swallowing, no redness in eyes  Respiratory: as above  Cardiovascular: no chest pain, no palpitations, no chronic leg edema, no syncope  Gastrointestinal: no abd pain, no vomiting, no diarrhea, no bleeding symptoms  Genitourinary: No urinary symptoms or hematuria  Integument/breast: No ulcers or rashes  Musculoskeletal:Neg  Neurological: No focal weakness, no seizures, no headaches  Behvioral/Psych: No anxiety, no depression  Constitutional: No fever, no chills, no weight loss, no night sweats     Objective:     Visit Vitals  BP (!) 106/40 (BP 1 Location: Left upper arm, BP Patient Position: Sitting, BP Cuff Size: Adult)   Pulse 79   Temp 97.7 °F (36.5 °C)   Resp 18   Ht 5' 5\" (1.651 m)   Wt 50.9 kg (112 lb 3.2 oz)   SpO2 97%   BMI 18.67 kg/m²        Physical Exam:   General: comfortable, no acute distress  HEENT: pupils reactive, sclera anicteric, EOM intact  Neck: No adenopathy or thyroid swelling, no lymphadenopathy or JVD, supple  CVS: S1S2 no murmurs  RS: Mod AE bilaterally, no tactile fremitus or egophony, no accessory muscle use, bibasilar dry crackles care home up lung fields no wheezing  Abd: soft, non tender, no hepatosplenomegaly  Neuro: non focal, awake, alert  Extrm: no leg edema, clubbing or cyanosis  Skin: no rash    Data review:   PULMONARY FUNCTION TESTS     Date FVC FEV1  FEV1/FVC [id number] TLC RV RV/TLC VC DLCO   11/10/16 103% 124% 88 57% 82% 64% 78% 102% 11.33 -62%   10/7/2019  72  86  normal    68  61    74  41/67%                                                  6-minute walk-6/3/2020-no significant oxygen desaturation noted  Walked 204 m without any oxygen desaturation    PFT 11/10/16:  Flows:  Normal flows  Volumes:  Functional Residual Capacity and Residual Volume are reduced  Flow Volume Loop:  Normal Flow Volume Loop  Diffusion:  Abnormal Diffusion Capacity reduced to 62 % predicted  Impression:  Reduced diffusion capacity indicating a decrease in alveolar surface area for gas exchange, Isolated reduction of Residual Volume and Functional Residual Capacity    Imaging:  I have personally reviewed the patients radiographs and have reviewed the reports:  CT scan  Chest [facility]: 10/10/2019    1. Interstitial fibrosis with probable UIP CT pattern. Interstitial lung disease identified characterized by diffuse subpleural reticular opacities with slight basilar predominance although somewhat limited in evaluation secondary to motion artifact. No convincing peripheral honeycombing. Traction bronchiectasis identified. Minimal areas of groundglass density. Limited evaluation for air trapping secondary to motion artifact. CT scan of chest- HRCT: 12/7/2016  IMPRESSIONS:  Moderate to marked idiopathic interstitial fibrosis, in the lower zones of both  lungs, described.   Mild to moderate idiopathic interstitial fibrosis in the middle and the upper  zones of both lungs, described.   Mild COPD with mild pulmonary emphysema without any obvious bulla or bleb  formation. In the lower lobes of both lung there are findings indicating mild  bronchiectasis with mild peribronchial thickenings, without retained fluid and  without definable mucous plugs. Focal fibrotic density versus nodule in right middle lobe and apical region of  left upper lobe. Any small pulmonary nodule in lungs may be obscured by  significant interstitial fibrosis. No evidence of pathologic lymphadenopathy or mass in mediastinum or at pulmonary  hilum in either side. Evaluation of focal densities in lungs with follow CT scan, in one year, is  recommended. Barium swallow: 12/7/2016;  Esophageal dysmotility. Small sliding hiatal hernia. Gastroesophageal reflux. Single episode of silent tracheal aspiration of a trace amount of barium  contrast. Recommend consideration of modified barium swallow with speech therapy  for further evaluation. 10/20/2016  Mild interstitial fibrosis in lower lungs bilaterally. Probable mild bronchitis. No evidence of confluent pneumonia, pleural effusion or pneumothorax. Findings suggestive of mild COPD. No significant interval change. IMPRESSION:   ·  ILD, Pulm fibrosis related to GERD, Aspiration, ? Asbestosis fibrosis noted - Interstitial fibrosis with probable UIP CT pattern. Symptomatically stable except cough which is rather bothersome   Interstitial lung disease identified characterized by diffuse subpleural reticular opacities with slight basilar predominance although somewhat limited in evaluation secondary to motion artifact. No convincing peripheral honeycombing. Traction bronchiectasis identified. Minimal areas of groundglass density. Limited evaluation for air trapping secondary to motion artifact. PFTs with minimal progression. No oxygen desaturation  · Cough-Bronchiectasis and chronic sinusitis ( allergic).   Symptomatic improvement noted with addition of Spiriva Respimat but not able to take and would benefit from nebulized therapy with a component of bronchospasm-asthma COPD. · Chronic rhino sinusitis - perineal and likely trigger for chronic cough   · Multiple myeloma on treatment  · Meinere's disease  · Frequent falls-with resultant hip fracture s/p treatment      RECOMMENDATIONS:   · Discussed with patient: will focus treatment on inciting causes- GERD/aspiration and treat chronic bronchitis  · Will also focus on symptom control-prescribe Tessalon Perles for her cough in addition to current treatment  · Less inclined to consider Esbriet ( typical benefit is for SOB symptoms)-given her age and comorbid diagnosis of multiple myeloma will not start treatment at this point. Discussed with daughter about managing symptom control  · Will discontinue Spiriva Respimat and start patient on nebulized duo nebs scheduled twice daily and as needed if needed-orders placed  · GERD precautions and treatment to continue  · PPI-daily Prilosec recommended  · We will check high-resolution CT of the chest,Pulmonary function testing,6-minute walk test as clinically indicated  · Speech therapy follow up  · Dietary instructions  · Treatment of myeloma and anemia per oncology  · Complete series of Covid vaccines  · Will follow up in 6 months       Health maintenance screens deferred to Primary care provider.      Rachna Bradshaw MD

## 2021-04-07 NOTE — PROGRESS NOTES
The pt. Has frequent falls and suffered a fractured left hip in Feb. 4    Resp. Symptoms are WNL. She has had CoVID Vaccine #1.

## 2021-05-07 ENCOUNTER — DOCUMENTATION ONLY (OUTPATIENT)
Dept: PULMONOLOGY | Age: 86
End: 2021-05-07

## 2025-05-12 NOTE — LETTER
4/7/2021 Patient: Radha Solomon YOB: 1931 Date of Visit: 4/7/2021 Rodrigue Zimmerman MD 
[facility] [address] [facility] [phone] [id number] [address] [address] Via Fax: [phone] Dear Rodrigue Zimmerman MD, Thank you for referring Ms. Fred Ramos to [address] [place] [address] for evaluation. My notes for this consultation are attached. If you have questions, please do not hesitate to call me. I look forward to following your patient along with you.  
 
 
Sincerely, 
 
Virgil Garcia MD 
 
 Detail Level: Detailed Accession # (Optional): [id number] Number Of Curettages: 3 Size Of Lesion In Cm: 0.8 Size Of Lesion After Curettage: 1 Add Intralesional Injection: No Anesthesia Type: 1% lidocaine with epinephrine Cautery Type: electrodesiccation What Was Performed First?: Curettage Final Size Statement: The size of the lesion after curettage was Additional Information: (Optional): The wound was cleaned, and a pressure dressing was applied.  The patient received detailed post-op instructions. Consent was obtained from the patient. The risks, benefits and alternatives to therapy were discussed in detail. Specifically, the risks of infection, scarring, bleeding, prolonged wound healing, nerve injury, incomplete removal, allergy to anesthesia and recurrence were addressed. Alternatives to ED&C, such as: surgical removal and XRT were also discussed.  Prior to the procedure, the treatment site was clearly identified and confirmed by the patient. All components of Universal Protocol/PAUSE Rule completed. Post-Care Instructions: I reviewed with the patient in detail post-care instructions. Patient is to keep the area dry for 48 hours, and not to engage in any swimming until the area is healed. Should the patient develop any fevers, chills, bleeding, severe pain patient will contact the office immediately. Bill As A Line Item Or As Units: Line Item